# Patient Record
Sex: FEMALE | Race: WHITE | Employment: OTHER | URBAN - METROPOLITAN AREA
[De-identification: names, ages, dates, MRNs, and addresses within clinical notes are randomized per-mention and may not be internally consistent; named-entity substitution may affect disease eponyms.]

---

## 2017-04-12 ENCOUNTER — EMPLOYEE WELLNESS (OUTPATIENT)
Dept: OTHER | Age: 62
End: 2017-04-12

## 2017-04-12 ENCOUNTER — HOSPITAL ENCOUNTER (OUTPATIENT)
Age: 62
Discharge: HOME | End: 2017-04-12
Payer: COMMERCIAL

## 2017-04-12 LAB
ALT SERPL-CCNC: 12 U/L (ref 5–33)
ANION GAP SERPL CALCULATED.3IONS-SCNC: 14 MMOL/L (ref 9–17)
BUN BLDV-MCNC: 17 MG/DL (ref 8–23)
BUN/CREAT BLD: 25 (ref 9–20)
CALCIUM SERPL-MCNC: 9.5 MG/DL (ref 8.6–10.4)
CHLORIDE BLD-SCNC: 101 MMOL/L (ref 98–107)
CHOLESTEROL/HDL RATIO: 5.4
CHOLESTEROL/HDL RATIO: 5.6
CHOLESTEROL: 173 MG/DL
CHOLESTEROL: 174 MG/DL
CO2: 28 MMOL/L (ref 20–31)
CREAT SERPL-MCNC: 0.68 MG/DL (ref 0.5–0.9)
CREATININE URINE: 206.1 MG/DL (ref 28–217)
ESTIMATED AVERAGE GLUCOSE: 143 MG/DL
ESTIMATED AVERAGE GLUCOSE: 143 MG/DL
GFR AFRICAN AMERICAN: >60 ML/MIN
GFR NON-AFRICAN AMERICAN: >60 ML/MIN
GFR SERPL CREATININE-BSD FRML MDRD: ABNORMAL ML/MIN/{1.73_M2}
GFR SERPL CREATININE-BSD FRML MDRD: ABNORMAL ML/MIN/{1.73_M2}
GLUCOSE BLD-MCNC: 157 MG/DL (ref 70–99)
GLUCOSE BLD-MCNC: 161 MG/DL (ref 70–99)
HBA1C MFR BLD: 6.6 % (ref 4–6)
HBA1C MFR BLD: 6.6 % (ref 4–6)
HDLC SERPL-MCNC: 31 MG/DL
HDLC SERPL-MCNC: 32 MG/DL
LDL CHOLESTEROL: 111 MG/DL (ref 0–130)
LDL CHOLESTEROL: 112 MG/DL (ref 0–130)
MICROALBUMIN/CREAT 24H UR: 104 MG/L
MICROALBUMIN/CREAT UR-RTO: 50 MCG/MG CREAT
PATIENT FASTING?: YES
POTASSIUM SERPL-SCNC: 4 MMOL/L (ref 3.7–5.3)
SODIUM BLD-SCNC: 143 MMOL/L (ref 135–144)
THYROXINE, FREE: 1.23 NG/DL (ref 0.93–1.7)
TOTAL CK: 55 U/L (ref 26–192)
TRIGL SERPL-MCNC: 149 MG/DL
TRIGL SERPL-MCNC: 154 MG/DL
TSH SERPL DL<=0.05 MIU/L-ACNC: 1.79 MIU/L (ref 0.3–5)
VLDLC SERPL CALC-MCNC: ABNORMAL MG/DL (ref 1–30)
VLDLC SERPL CALC-MCNC: ABNORMAL MG/DL (ref 1–30)

## 2017-04-12 PROCEDURE — 84439 ASSAY OF FREE THYROXINE: CPT

## 2017-04-12 PROCEDURE — 82550 ASSAY OF CK (CPK): CPT

## 2017-04-12 PROCEDURE — 83036 HEMOGLOBIN GLYCOSYLATED A1C: CPT

## 2017-04-12 PROCEDURE — 82570 ASSAY OF URINE CREATININE: CPT

## 2017-04-12 PROCEDURE — 36415 COLL VENOUS BLD VENIPUNCTURE: CPT

## 2017-04-12 PROCEDURE — 80048 BASIC METABOLIC PNL TOTAL CA: CPT

## 2017-04-12 PROCEDURE — 84443 ASSAY THYROID STIM HORMONE: CPT

## 2017-04-12 PROCEDURE — 84460 ALANINE AMINO (ALT) (SGPT): CPT

## 2017-04-12 PROCEDURE — 80061 LIPID PANEL: CPT

## 2017-04-12 PROCEDURE — 82043 UR ALBUMIN QUANTITATIVE: CPT

## 2017-05-26 ENCOUNTER — HOSPITAL ENCOUNTER (OUTPATIENT)
Dept: MAMMOGRAPHY | Age: 62
Discharge: HOME | End: 2017-05-26
Payer: COMMERCIAL

## 2017-05-26 ENCOUNTER — HOSPITAL ENCOUNTER (EMERGENCY)
Facility: CLINIC | Age: 62
Discharge: HOME | End: 2017-05-26
Attending: EMERGENCY MEDICINE
Payer: COMMERCIAL

## 2017-05-26 VITALS
BODY MASS INDEX: 32.2 KG/M2 | DIASTOLIC BLOOD PRESSURE: 78 MMHG | WEIGHT: 175 LBS | TEMPERATURE: 97.9 F | HEART RATE: 82 BPM | SYSTOLIC BLOOD PRESSURE: 134 MMHG | RESPIRATION RATE: 16 BRPM | OXYGEN SATURATION: 97 % | HEIGHT: 62 IN

## 2017-05-26 DIAGNOSIS — Z12.31 VISIT FOR SCREENING MAMMOGRAM: ICD-10-CM

## 2017-05-26 DIAGNOSIS — S05.8X2A EYE ABRASION, LEFT, INITIAL ENCOUNTER: Primary | ICD-10-CM

## 2017-05-26 PROCEDURE — 77063 BREAST TOMOSYNTHESIS BI: CPT

## 2017-05-26 PROCEDURE — 99283 EMERGENCY DEPT VISIT LOW MDM: CPT

## 2017-05-26 RX ORDER — TOBRAMYCIN 3 MG/ML
1 SOLUTION/ DROPS OPHTHALMIC EVERY 4 HOURS
Qty: 5 ML | Refills: 0 | Status: SHIPPED | OUTPATIENT
Start: 2017-05-26 | End: 2017-06-02

## 2017-05-26 RX ORDER — VALSARTAN 160 MG/1
160 TABLET ORAL DAILY
Status: ON HOLD | COMMUNITY
End: 2019-01-15 | Stop reason: ALTCHOICE

## 2017-05-26 ASSESSMENT — PAIN DESCRIPTION - PAIN TYPE
TYPE: ACUTE PAIN
TYPE: ACUTE PAIN

## 2017-05-26 ASSESSMENT — PAIN DESCRIPTION - FREQUENCY
FREQUENCY: CONTINUOUS
FREQUENCY: CONTINUOUS

## 2017-05-26 ASSESSMENT — PAIN - FUNCTIONAL ASSESSMENT: PAIN_FUNCTIONAL_ASSESSMENT: 0-10

## 2017-05-26 ASSESSMENT — PAIN DESCRIPTION - LOCATION
LOCATION: EYE
LOCATION: EYE

## 2017-05-26 ASSESSMENT — PAIN SCALES - GENERAL
PAINLEVEL_OUTOF10: 2
PAINLEVEL_OUTOF10: 2

## 2017-05-26 ASSESSMENT — PAIN DESCRIPTION - ORIENTATION
ORIENTATION: LEFT
ORIENTATION: LEFT

## 2017-08-01 ENCOUNTER — HOSPITAL ENCOUNTER (OUTPATIENT)
Age: 62
Discharge: HOME | End: 2017-08-01
Payer: COMMERCIAL

## 2017-08-01 LAB — RUBV IGG SER QL: 39.7 IU/ML

## 2017-08-01 PROCEDURE — 86481 TB AG RESPONSE T-CELL SUSP: CPT

## 2017-08-01 PROCEDURE — 86762 RUBELLA ANTIBODY: CPT

## 2017-08-01 PROCEDURE — 86765 RUBEOLA ANTIBODY: CPT

## 2017-08-01 PROCEDURE — 86735 MUMPS ANTIBODY: CPT

## 2017-08-01 PROCEDURE — 86787 VARICELLA-ZOSTER ANTIBODY: CPT

## 2017-08-03 LAB — MEASLES IMMUNE (IGG): 2.31

## 2017-08-04 LAB
MUV IGG SER QL: 1.14
T-SPOT TB TEST: NORMAL
VZV IGG SER QL IA: 1.56

## 2017-09-22 ENCOUNTER — HOSPITAL ENCOUNTER (OUTPATIENT)
Dept: GENERAL RADIOLOGY | Age: 62
Discharge: HOME | End: 2017-09-22
Payer: COMMERCIAL

## 2017-09-22 ENCOUNTER — HOSPITAL ENCOUNTER (OUTPATIENT)
Age: 62
Discharge: HOME | End: 2017-09-22
Payer: COMMERCIAL

## 2017-09-22 DIAGNOSIS — M54.31 BACK PAIN WITH RIGHT-SIDED SCIATICA: ICD-10-CM

## 2017-09-22 DIAGNOSIS — M70.71 BURSITIS OF BOTH HIPS, UNSPECIFIED BURSA: ICD-10-CM

## 2017-09-22 DIAGNOSIS — M70.72 BURSITIS OF BOTH HIPS, UNSPECIFIED BURSA: ICD-10-CM

## 2017-09-22 LAB
-: ABNORMAL
ABSOLUTE EOS #: 0.2 K/UL (ref 0–0.4)
ABSOLUTE LYMPH #: 2 K/UL (ref 1–4.8)
ABSOLUTE MONO #: 0.4 K/UL (ref 0.2–0.8)
AMORPHOUS: ABNORMAL
BACTERIA: ABNORMAL
BASOPHILS # BLD: 0 %
BASOPHILS ABSOLUTE: 0 K/UL (ref 0–0.2)
BILIRUBIN URINE: NEGATIVE
CASTS UA: ABNORMAL /LPF
COLOR: YELLOW
COMMENT UA: ABNORMAL
CRYSTALS, UA: ABNORMAL /HPF
DIFFERENTIAL TYPE: NORMAL
EOSINOPHILS RELATIVE PERCENT: 3 %
EPITHELIAL CELLS UA: ABNORMAL /HPF
GLUCOSE URINE: NEGATIVE
HCT VFR BLD CALC: 41.7 % (ref 36–46)
HEMOGLOBIN: 14.2 G/DL (ref 12–16)
KETONES, URINE: NEGATIVE
LEUKOCYTE ESTERASE, URINE: NEGATIVE
LYMPHOCYTES # BLD: 26 %
MCH RBC QN AUTO: 31.8 PG (ref 26–34)
MCHC RBC AUTO-ENTMCNC: 33.9 G/DL (ref 31–37)
MCV RBC AUTO: 93.7 FL (ref 80–100)
MONOCYTES # BLD: 5 %
MUCUS: ABNORMAL
NITRITE, URINE: NEGATIVE
OTHER OBSERVATIONS UA: ABNORMAL
PDW BLD-RTO: 12.9 % (ref 11.5–14.5)
PH UA: 5 (ref 5–8)
PLATELET # BLD: 244 K/UL (ref 130–400)
PLATELET ESTIMATE: NORMAL
PMV BLD AUTO: 8 FL (ref 6–12)
PROTEIN UA: NEGATIVE
RBC # BLD: 4.45 M/UL (ref 4–5.2)
RBC # BLD: NORMAL 10*6/UL
RBC UA: ABNORMAL /HPF (ref 0–2)
RENAL EPITHELIAL, UA: ABNORMAL /HPF
SEG NEUTROPHILS: 66 %
SEGMENTED NEUTROPHILS ABSOLUTE COUNT: 4.9 K/UL (ref 1.8–7.7)
SPECIFIC GRAVITY UA: 1.03 (ref 1–1.03)
TRICHOMONAS: ABNORMAL
TURBIDITY: ABNORMAL
URINE HGB: NEGATIVE
UROBILINOGEN, URINE: NORMAL
WBC # BLD: 7.5 K/UL (ref 3.5–11)
WBC # BLD: NORMAL 10*3/UL
WBC UA: ABNORMAL /HPF (ref 0–5)
YEAST: ABNORMAL

## 2017-09-22 PROCEDURE — 72100 X-RAY EXAM L-S SPINE 2/3 VWS: CPT

## 2017-09-22 PROCEDURE — 73502 X-RAY EXAM HIP UNI 2-3 VIEWS: CPT

## 2017-09-22 PROCEDURE — 85025 COMPLETE CBC W/AUTO DIFF WBC: CPT

## 2017-09-22 PROCEDURE — 81001 URINALYSIS AUTO W/SCOPE: CPT

## 2017-09-25 ENCOUNTER — HOSPITAL ENCOUNTER (OUTPATIENT)
Age: 62
Discharge: HOME | End: 2017-09-25
Payer: COMMERCIAL

## 2017-09-25 LAB
-: ABNORMAL
AMORPHOUS: ABNORMAL
BACTERIA: ABNORMAL
BILIRUBIN URINE: NEGATIVE
CASTS UA: ABNORMAL /LPF
COLOR: YELLOW
COMMENT UA: ABNORMAL
CRYSTALS, UA: ABNORMAL /HPF
EPITHELIAL CELLS UA: ABNORMAL /HPF
GLUCOSE URINE: NEGATIVE
KETONES, URINE: NEGATIVE
LEUKOCYTE ESTERASE, URINE: NEGATIVE
MUCUS: ABNORMAL
NITRITE, URINE: NEGATIVE
OTHER OBSERVATIONS UA: ABNORMAL
PH UA: 6 (ref 5–8)
PROTEIN UA: NEGATIVE
RBC UA: ABNORMAL /HPF (ref 0–2)
RENAL EPITHELIAL, UA: ABNORMAL /HPF
SPECIFIC GRAVITY UA: 1.02 (ref 1–1.03)
TRICHOMONAS: ABNORMAL
TURBIDITY: ABNORMAL
URINE HGB: ABNORMAL
UROBILINOGEN, URINE: NORMAL
WBC UA: ABNORMAL /HPF (ref 0–5)
YEAST: ABNORMAL

## 2017-09-25 PROCEDURE — 81001 URINALYSIS AUTO W/SCOPE: CPT

## 2017-09-26 ENCOUNTER — HOSPITAL ENCOUNTER (OUTPATIENT)
Dept: PHYSICAL THERAPY | Facility: CLINIC | Age: 62
Setting detail: THERAPIES SERIES
Discharge: HOME | End: 2017-09-26
Payer: COMMERCIAL

## 2017-09-26 PROCEDURE — 97162 PT EVAL MOD COMPLEX 30 MIN: CPT

## 2017-09-26 PROCEDURE — 97140 MANUAL THERAPY 1/> REGIONS: CPT

## 2017-09-28 ENCOUNTER — HOSPITAL ENCOUNTER (OUTPATIENT)
Dept: PHYSICAL THERAPY | Facility: CLINIC | Age: 62
Setting detail: THERAPIES SERIES
Discharge: HOME | End: 2017-09-28
Payer: COMMERCIAL

## 2017-09-28 PROCEDURE — 97110 THERAPEUTIC EXERCISES: CPT

## 2017-10-02 ENCOUNTER — APPOINTMENT (OUTPATIENT)
Dept: PHYSICAL THERAPY | Facility: CLINIC | Age: 62
End: 2017-10-02
Payer: COMMERCIAL

## 2017-10-02 ENCOUNTER — HOSPITAL ENCOUNTER (OUTPATIENT)
Dept: PHYSICAL THERAPY | Facility: CLINIC | Age: 62
Setting detail: THERAPIES SERIES
Discharge: HOME | End: 2017-10-02
Payer: COMMERCIAL

## 2017-10-02 PROCEDURE — 97140 MANUAL THERAPY 1/> REGIONS: CPT

## 2017-10-02 PROCEDURE — G0283 ELEC STIM OTHER THAN WOUND: HCPCS

## 2017-10-02 PROCEDURE — 97110 THERAPEUTIC EXERCISES: CPT

## 2017-10-05 ENCOUNTER — HOSPITAL ENCOUNTER (OUTPATIENT)
Dept: PHYSICAL THERAPY | Facility: CLINIC | Age: 62
Setting detail: THERAPIES SERIES
Discharge: HOME | End: 2017-10-05
Payer: COMMERCIAL

## 2017-10-05 PROCEDURE — 97110 THERAPEUTIC EXERCISES: CPT

## 2017-10-05 PROCEDURE — 97140 MANUAL THERAPY 1/> REGIONS: CPT

## 2017-10-05 NOTE — FLOWSHEET NOTE
[] Montana Whipple       Outpatient Physical        Therapy       [address]       Phone: [phone]       Fax: [phone] [x] [facility] [address] [facility]       Phone: [phone]       Fax: [phone] [] Danedharmesh. [address] [place] [address] [facility]  [address] [place] [address]   Phone: [phone]   Fax:  [phone]     Physical Therapy Daily Treatment Note    Date:  10/5/2017  Patient Name:  Connor Lyme    :  1955  MRN: [id number]   Physician: Marty Moya MD                                                         Insurance: Medical [place]  Medical Diagnosis: Right sided sciatica, Bursitis of R hip             Rehab Codes: M54.31, M70.71   Onset Date: 17                                  Next 's appt.: prn   Visit# / total visits:   Cancels/No Shows: 0/0    Subjective:    Pain:  [x] Yes  [] No Location:  R side buttock area  Pain Rating: (0-10 scale) 4/10  Pain altered Tx:  [] No  [x] Yes  Action: HEP- extension exercises    Comments: Pt stated she has experienced decreased radiating pain down the RLE, but continues to have sharp pain localized to the R side buttock area. Objective:     Modalities:IFC to low back, R piriformis and posterior thigh with CP along low back/posterior hip and CP along anterior hip x 20 min  Precautions: limit Flexion based exercises secondary to increased pain/sx; significant improvement with repeated lumbar extension   Exercises:   Exercise Reps/ Time Weight/ Level Comments Performed    Pt.  Education 3 min   Added lumbar roll for sitting activities  X   Standing back extension stretch 5x15\"  Education for HEP X   Sitting back extension stretch 2x15\"  Education for HEP X   Posterior Pelvic Tilt  10x  3 s hold  Increase in pain (R hip)-HOLD    PPT with marching          PPT with walk outs         HS stretch b/l  4x20\"    stool  x   Piriformis stretch b/l  4x20\"   Sitting  x Gastroc stretch b/l           IT band stretch b/l  3x20\"     X   Clams  15x Red TB   X   SLR                               PRONE on one pillow       Prone lying    5 mins     X   BENNY 2 min x 2   ADDED REP 10/2 X   Press ups  10x2   X   Gluteal sets 10x5\"   X    HS curls 10x ea  yellow TB  added resistance 10/5 X    Hip ext  10x ea  yellow TB SLR; added resistance 10/5 X                                   Other:       Manual - Foam roll to B piriformis/IT band (focus on R) x 10 min        Specific Instructions for next treatment: check leg length with heel lift next tx, MFR/foam roll piriformis/IT band, core stabilization, hip strengthening/flexibility, proper posture mechanics       Treatment Charges: Mins Units   [x]  Modalities: IFC/CP x 2 20/20 1/0   [x]  Ther Exercise 35 2   [x]  Manual Therapy 10 1   []  Ther Activities     []  Aquatics     []  Vasocompression     []  Other     Total Treatment time 65 5       Assessment: [x] Progressing toward goals. Pt tolerated above exercises well today with no increase in pain. Pt stated lumbar extension exercises significantly reduce her pain especially during her 12 hour shifts. Pt stated yesterday she had localized pain in her R buttocks but it did not radiate into her thigh (during her work shift), she performed standing lumbar extension and felt relief of her sx/sx. Pt was instructed to add a lumbar roll with sitting activities especially with driving and for upcoming travel this weekend to trial if it reduces pain. Pt will continue to benefit from therapy to reduce pain and improve posture mechanics. [] No change. [] Other:       STG: (to be met in 8 treatments) (Start date: 9/26/17)   1. ? Pain: <6/10 at worst with aggravating activities  2. ? ROM: lumbar extension >20 deg, Side bending>  25 deg, flexion >70 deg all pain free  a. (-) HS tightness b/l  b. (-) elys test b/l  3. ? Strength:>4/5 for BLE strength (era.  Hip)   4. ? Function: able to sit for >10

## 2017-10-09 ENCOUNTER — APPOINTMENT (OUTPATIENT)
Dept: PHYSICAL THERAPY | Facility: CLINIC | Age: 62
End: 2017-10-09
Payer: COMMERCIAL

## 2017-10-09 ENCOUNTER — HOSPITAL ENCOUNTER (OUTPATIENT)
Dept: PHYSICAL THERAPY | Facility: CLINIC | Age: 62
Setting detail: THERAPIES SERIES
Discharge: HOME | End: 2017-10-09
Payer: COMMERCIAL

## 2017-10-09 PROCEDURE — G0283 ELEC STIM OTHER THAN WOUND: HCPCS

## 2017-10-09 PROCEDURE — 97110 THERAPEUTIC EXERCISES: CPT

## 2017-10-09 NOTE — FLOWSHEET NOTE
[] Cecelia Mckeon       Outpatient Physical        Therapy       [address]       Phone: [phone]       Fax: [phone] [x] [facility] at [address]       Phone: [phone]       Fax: [phone] [] Yaneth. [address] [place] [address] [facility]  [address] [place] [address]   Phone: [phone]   Fax:  [phone]     Physical Therapy Daily Treatment Note    Date:  10/9/2017  Patient Name:  Sam Chow    :  1955  MRN: [id number]   Physician: Ace Carrillo MD                                                         Insurance: Medical [place]  Medical Diagnosis: Right sided sciatica, Bursitis of R hip             Rehab Codes: M54.31, M70.71   Onset Date: 17                                  Next 's appt.: prn   Visit# / total visits:   Cancels/No Shows: 0/0    Subjective:    Pain:  [x] Yes  [] No Location:  R side buttock area  Pain Rating: (0-10 scale) 3/10 (increasingly intermittent)  Pain altered Tx:  [] No  [x] Yes  Action: HEP- extension exercises    Comments: Pt reports she feels like she is improving and had minimal increase in pain with driving to/from [place]. She stated once the pain came on, she would perform the lumbar extension HEP and her pain would reduce significantly. Pt reports sx/sx are coming about less often. Objective:    Modalities:IFC to low back, R piriformis and posterior thigh with CP along low back/posterior hip and CP along anterior hip x 20 min  Precautions: limit Flexion based exercises secondary to increased pain/sx; significant improvement with repeated lumbar extension   Exercises:   Exercise Reps/ Time Weight/ Level Comments Performed    Pt.  Education 3 min   Added lumbar roll for sitting activities     Standing back extension stretch 5x15\"  Education for HEP X   Sitting back extension stretch 2x15\"  Education for HEP X   Posterior Pelvic Tilt  10x  3 s hold

## 2017-10-12 ENCOUNTER — HOSPITAL ENCOUNTER (OUTPATIENT)
Facility: CLINIC | Age: 62
Discharge: HOME | End: 2017-10-12
Payer: COMMERCIAL

## 2017-10-12 ENCOUNTER — HOSPITAL ENCOUNTER (OUTPATIENT)
Dept: PHYSICAL THERAPY | Facility: CLINIC | Age: 62
Setting detail: THERAPIES SERIES
Discharge: HOME | End: 2017-10-12
Payer: COMMERCIAL

## 2017-10-12 ENCOUNTER — HOSPITAL ENCOUNTER (OUTPATIENT)
Dept: ULTRASOUND IMAGING | Facility: CLINIC | Age: 62
Discharge: HOME | End: 2017-10-12
Payer: COMMERCIAL

## 2017-10-12 DIAGNOSIS — R31.9 HEMATURIA: ICD-10-CM

## 2017-10-12 LAB
-: NORMAL
AMORPHOUS: NORMAL
BACTERIA: NORMAL
BILIRUBIN URINE: NEGATIVE
CASTS UA: NORMAL /LPF (ref 0–8)
COLOR: YELLOW
COMMENT UA: ABNORMAL
CRYSTALS, UA: NORMAL /HPF
EPITHELIAL CELLS UA: NORMAL /HPF (ref 0–5)
GLUCOSE URINE: NEGATIVE
KETONES, URINE: NEGATIVE
LEUKOCYTE ESTERASE, URINE: NEGATIVE
MUCUS: NORMAL
NITRITE, URINE: NEGATIVE
OTHER OBSERVATIONS UA: NORMAL
PH UA: 6.5 (ref 5–8)
PROTEIN UA: NEGATIVE
RBC UA: NORMAL /HPF (ref 0–4)
RENAL EPITHELIAL, UA: NORMAL /HPF
SPECIFIC GRAVITY UA: 1.01 (ref 1–1.03)
TRICHOMONAS: NORMAL
TURBIDITY: CLEAR
URINE HGB: ABNORMAL
UROBILINOGEN, URINE: NORMAL
WBC UA: NORMAL /HPF (ref 0–5)
YEAST: NORMAL

## 2017-10-12 PROCEDURE — 97016 VASOPNEUMATIC DEVICE THERAPY: CPT

## 2017-10-12 PROCEDURE — 76775 US EXAM ABDO BACK WALL LIM: CPT

## 2017-10-12 PROCEDURE — 97110 THERAPEUTIC EXERCISES: CPT

## 2017-10-12 PROCEDURE — 81001 URINALYSIS AUTO W/SCOPE: CPT

## 2017-10-12 PROCEDURE — 97140 MANUAL THERAPY 1/> REGIONS: CPT

## 2017-10-12 NOTE — FLOWSHEET NOTE
(to be met in 8 treatments) (Start date: 9/26/17)   1. ? Pain: <6/10 at worst with aggravating activities  2. ? ROM: lumbar extension >20 deg, Side bending>  25 deg, flexion >70 deg all pain free  a. (-) HS tightness b/l  b. (-) elys test b/l  3. ? Strength:>4/5 for BLE strength (era. Hip)   4. ? Function: able to sit for >10 min with no increase in sx/sx  a. Able to walk >30 min with no increase in sx/sx  5. Independent with Home Exercise Programs  6. Corrected leg length discrepancy to improve walking mechanics and decrease stress on low back/R hip   LTG: (to be met in 12 treatments)  1. Pt to score less than 20% perceived disability with the LEFS. 2. Pt able to increase abdominal core strength to 3/5 for core stabilization to reduce low back pain. 3. Pt reports <2/10 with aggravating activities.         Patient goals: to be pain free     Pt. Education:  [x] Yes  [] No  [x] Reviewed Prior HEP/Ed- added lumbar roll for sitting activities. Method of Education: [x] Verbal  [x] Demo  [x] Written  Comprehension of Education:  [x] Verbalizes understanding. [] Demonstrates understanding. [x] Needs review. [] Demonstrates/verbalizes HEP/Ed previously given. Plan: [x] Continue per plan of care.    [] Other:      Time In: 3:15  Time Out: 4:17 p  Electronically signed by:  Sydni Andres PT

## 2017-10-16 ENCOUNTER — HOSPITAL ENCOUNTER (OUTPATIENT)
Dept: PHYSICAL THERAPY | Facility: CLINIC | Age: 62
Setting detail: THERAPIES SERIES
Discharge: HOME | End: 2017-10-16
Payer: COMMERCIAL

## 2017-10-16 PROCEDURE — 97110 THERAPEUTIC EXERCISES: CPT

## 2017-10-16 PROCEDURE — 97140 MANUAL THERAPY 1/> REGIONS: CPT

## 2017-10-16 PROCEDURE — G0283 ELEC STIM OTHER THAN WOUND: HCPCS

## 2017-10-19 ENCOUNTER — HOSPITAL ENCOUNTER (OUTPATIENT)
Dept: PHYSICAL THERAPY | Facility: CLINIC | Age: 62
Setting detail: THERAPIES SERIES
Discharge: HOME | End: 2017-10-19
Payer: COMMERCIAL

## 2017-10-19 PROCEDURE — 97110 THERAPEUTIC EXERCISES: CPT

## 2017-10-19 PROCEDURE — G0283 ELEC STIM OTHER THAN WOUND: HCPCS

## 2017-10-19 NOTE — FLOWSHEET NOTE
[] Elizabeth Pantoja       Outpatient Physical        Therapy       [address]       Phone: [phone]       Fax: [phone] [x] [facility] [address] [facility]       Phone: [phone]       Fax: [phone] [] Stevo Franco [facility]  [address] [facility]   Phone: [phone]   Fax:  [phone]     Physical Therapy Daily Treatment Note    Date:  10/19/2017  Patient Name:  Neeru Baltazar    :  1955  MRN: [id number]   Physician: Uma Reddy MD                                                         Insurance: Medical [place]  Medical Diagnosis: Right sided sciatica, Bursitis of R hip             Rehab Codes: M54.31, M70.71   Onset Date: 17                                  Next 's appt.: prn   Visit# / total visits:   Cancels/No Shows: 0/0    Subjective:    Pain:  [x] Yes  [] No Location:  R side buttock area  Pain Rating: (0-10 scale) 2/10 (now) over the weekend 5/10 (at worst)   Pain altered Tx:  [] No  [x] Yes  Action: HEP- extension exercises, ice    Comments: Pt reported increased aggravation of posterior buttocks/lateral hip pain this weekend, but reduced with performing HEP. Pt commented that she feels anxious when the pain comes on again, in worry that it will not go away. (therapist addressed pt's concerns)        Objective:    Modalities: Vaso to R hip x 15 min, low compression at 34 deg     IFC to low back, R piriformis and posterior thigh with CP along low back/posterior hip and CP along anterior hip x 20 min     Precautions: limit Flexion based exercises secondary to increased pain/sx; significant improvement with repeated lumbar extension     Exercises:   Exercise Reps/ Time Weight/ Level Comments Performed    Pt.  Education 3 min   Use of lacrosse ball for trigger point release  X   Standing back extension stretch 5x15\"  Education for HEP X   Sitting back extension stretch

## 2017-10-19 NOTE — PROGRESS NOTES
- 20 deg to R, 25 deg to L, no pain      Assessed pelvic alignment in standing- equal with heel lift 10/12    Assessment:  [x] Progressing toward goals. Pt is progressing with therapy and reports significant decrease in sx/sx since therapy began. Pt has met majority of her short term goals with decreased pain, increased BLE strength, improved function, and improved pelvic obliquity. Pt continues to exhibit limited lumbar AROM with slight improvement in range and decreased pain reported with all motions. Pt demonstrates moderate hip weakness with mild improvement. Therapist will continue with plan of care and work towards improving b/l hip strength, flexibility, and returning pt to PLOF with minimal to no pain. [] No change. [] Other:         STG: (to be met in 8 treatments) (Start date: 9/26/17)   1. ? Pain: <6/10 at worst with aggravating activities MET (1x at work, averaging 2/10)   2. ? ROM: lumbar extension >20 deg, Side bending>  25 deg, flexion >70 deg all pain free (not met, 70 degrees, 20 deg to R, 25 deg to L , and 15 deg for extension NOT MET    a. (-) HS tightness b/l (MET on L, NOT MET ON R (9 deg deficit))   b. (-) elys test b/l (+ on R)   3. ? Strength:>4/5 for BLE strength (era. Hip) - PARTIALLY met  4. ? Function:   a. able to sit for >10 min with no increase in sx/sx MET   b. Able to walk >30 min with no increase in sx/sx MET  5. Independent with Home Exercise Programs met   6. Corrected leg length discrepancy to improve walking mechanics and decrease stress on low back/R hip  MET     LTG: (to be met in 12 treatments)  1. Pt to score less than 20% perceived disability with the LEFS. 2. Pt able to increase abdominal core strength to 3/5 for core stabilization to reduce low back pain.    3. Pt reports <2/10 with aggravating activities.         Patient goals: to be pain free      Treatment to Date:  [x] Therapeutic Exercise    [x] Modalities:  [x] Therapeutic Activity    [] Ultrasound  [x] Electrical Stimulation  [x] Gait Training     [x] Massage       [] Lumbar/Cervical Traction  [x] Neuromuscular Re-education [x] Cold/hotpack [] Iontophoresis: 4 mg/mL  [x] Instruction in Home Exercise Program                     Dexamethasone Sodium  [x] Manual Therapy             Phosphate 40-80 mAmin  [] Aquatic Therapy                   [x] Vasocompression/    [] Other:            Game Ready    Patient Status:     [x] Continue per initial plan of care. [] Additional visits necessary. [] Other:             Electronically signed by: Soy Murillo PT    If you have any questions or concerns, please don't hesitate to call. Thank you for your referral.    Physician Signature:________________________________Date:__________________  By signing above or cosigning this note, I have reviewed this plan of care and certify a need for medically necessary rehabilitation services.      *PLEASE SIGN ABOVE AND FAX BACK ALL PAGES*

## 2017-10-24 ENCOUNTER — HOSPITAL ENCOUNTER (OUTPATIENT)
Dept: PHYSICAL THERAPY | Facility: CLINIC | Age: 62
Setting detail: THERAPIES SERIES
Discharge: HOME | End: 2017-10-24
Payer: COMMERCIAL

## 2017-10-24 PROCEDURE — 97110 THERAPEUTIC EXERCISES: CPT

## 2017-10-24 PROCEDURE — 97140 MANUAL THERAPY 1/> REGIONS: CPT

## 2017-10-24 NOTE — FLOWSHEET NOTE
[] [facility]       Outpatient Physical        Therapy       [address]       Phone: [phone]       Fax: [phone] [x] [facility] at [address]       Phone: [phone]       Fax: [phone] [] Yaneth. [facility]  [address] [place] [address]   Phone: [phone]   Fax:  [phone]     Physical Therapy Daily Treatment Note    Date:  10/24/2017  Patient Name:  Rona Sanders    :  1955  MRN: [id number]   Physician: Faith Mendez MD                                                         Insurance: Medical [place]  Medical Diagnosis: Right sided sciatica, Bursitis of R hip             Rehab Codes: M54.31, M70.71   Onset Date: 17                                  Next 's appt.: prn   Visit# / total visits:   Cancels/No Shows: 0/0    Subjective:    Pain:  [x] Yes  [] No Location:  R side buttock area  Pain Rating: (0-10 scale) 4/10 (now) over the weekend 5/10 (at worst)   Pain altered Tx:  [] No  [x] Yes  Action: HEP- extension exercises, ice    Comments: Pt went out of town to [place]. Pt was performing long distance driving/flying on airplane with long durations of lumbar flexion causing increased sx/sx. Pt reported her pain would increase, but the lumbar roll \"seemed to help lessen the pain. \" Pt stated she was very compliant with her HEP. Objective:    Modalities: Vaso to R hip x 15 min, low compression at 34 deg     IFC to low back, R piriformis and posterior thigh with CP along low back/posterior hip and CP along anterior hip x 20 min HELD 10/24 d/t patient having red, spotty rash along low back with a few patches in mid-thoracic; Pt reports rash may be related to estim as she has used TENs in the past (20+ years ago) and had a similar reaction. Pt was adamant to use estim regardless today as she feels it greatly relieves sx/sx.  However, therapist advised agaisnt heat region    L3-4 Knee Ext 4; 5  4-5 5  5    L4 Ankle DF 4-; 5  3+ 5 3+; 5    L5 EHL NT NT NT NT   S1 Plant. Flex 4 4 NT NT   Abdominals 2       Erector Spinae         Hip extension  3+ 3+  4 4-     Lumbar AROM 10-20:   - 70 deg, non-painful, pulling sensation   - 15 deg extension, no pain    - 20 deg to R, 25 deg to L, no pain      Specific Instructions for next treatment:  MFR/foam roll piriformis/IT band, core stabilization, hip strengthening/flexibility (add standing ex next tx session)  proper posture mechanics       Treatment Charges: Mins Units   [x]  Modalities: CP 15 0   [x]  Ther Exercise 35 2   [x]  Manual Therapy 10 1    []  Ther Activities     []  Aquatics     []  Vasocompression     []  Other- estim     Total Treatment time 45 3       Assessment: [x] Progressing toward goals. Pt reported increased pain over the weekend with repetitive, prolonged sitting with car/airplane rides. Pt tolerated added exercises well with no increase in sx/sx. Pt reported at end of session pain was at 0/10. Pt mentioned she developed a rash last week after therapy and believes it may be related to e-stim. The rash was along low back/mid thoracic region and is \"itchy\" outside the area of the patches. Therapist advised pt to seek medical care for rash, pt was hesitant. Therapist held electrical stim as pt is not appropriate with active, undiagnosed rash. Pt will continue to benefit from therapeutics intervention to improve walking mechanics, hip strength, and reduce pain. [] No change. [] Other:       STG: (to be met in 8 treatments) (Start date: 9/26/17)   1. ? Pain: <6/10 at worst with aggravating activities MET (1x at work, averaging 2/10)   2. ? ROM: lumbar extension >20 deg, Side bending>  25 deg, flexion >70 deg all pain free (not met, 70 degrees, 20 deg to R, 25 deg to L , and 15 deg for extension NOT MET    a. (-) HS tightness b/l (MET on L, NOT MET ON R (9 deg deficit))   b. (-) elys test b/l (+ on R)   3. ?

## 2017-10-26 ENCOUNTER — HOSPITAL ENCOUNTER (OUTPATIENT)
Dept: PHYSICAL THERAPY | Facility: CLINIC | Age: 62
Setting detail: THERAPIES SERIES
Discharge: HOME | End: 2017-10-26
Payer: COMMERCIAL

## 2017-10-26 PROCEDURE — 97110 THERAPEUTIC EXERCISES: CPT

## 2017-10-26 PROCEDURE — G0283 ELEC STIM OTHER THAN WOUND: HCPCS

## 2017-10-26 PROCEDURE — 97140 MANUAL THERAPY 1/> REGIONS: CPT

## 2017-10-26 NOTE — FLOWSHEET NOTE
L4 Ankle DF 4-; 5  3+ 5 3+; 5    L5 EHL NT NT NT NT   S1 Plant. Flex 4 4 NT NT   Abdominals 2       Erector Spinae         Hip extension  3+ 3+  4 4-     Lumbar AROM 10-20:   - 70 deg, non-painful, pulling sensation   - 15 deg extension, no pain    - 20 deg to R, 25 deg to L, no pain      Specific Instructions for next treatment:  MFR/foam roll piriformis/IT band, core stabilization, hip strengthening/flexibility (add standing ex next tx session)  proper posture mechanics       Treatment Charges: Mins Units   [x]  Modalities: CP/estim 20 0/1   [x]  Ther Exercise 35 2   [x]  Manual Therapy 10 1    []  Ther Activities     []  Aquatics     []  Vasocompression     []  Other- estim     Total Treatment time 65 4       Assessment: [x] Progressing toward goals. Therapist advanced pt's exercise regimen to work towards decreasing localized buttocks pain since pt's low back/radiating pain has resolved. Pt tolerated added resistance/exercises well with minimal cueing (verbal/tactile) for proper form with good carry over noted. Pt is improving and therapist will continue to progress pt as tolerated. [] No change. [] Other:       STG: (to be met in 8 treatments) (Start date: 9/26/17)   1. ? Pain: <6/10 at worst with aggravating activities MET (1x at work, averaging 2/10)   2. ? ROM: lumbar extension >20 deg, Side bending>  25 deg, flexion >70 deg all pain free (not met, 70 degrees, 20 deg to R, 25 deg to L , and 15 deg for extension NOT MET    a. (-) HS tightness b/l (MET on L, NOT MET ON R (9 deg deficit))   b. (-) elys test b/l (+ on R)   3. ? Strength:>4/5 for BLE strength (era. Hip) - PARTIALLY met  4. ? Function:   a. able to sit for >10 min with no increase in sx/sx MET   b. Able to walk >30 min with no increase in sx/sx MET  5. Independent with Home Exercise Programs met   6.  Corrected leg length discrepancy to improve walking mechanics and decrease stress on low back/R hip  MET    LTG: (to be met in 12

## 2017-10-30 ENCOUNTER — HOSPITAL ENCOUNTER (OUTPATIENT)
Dept: PHYSICAL THERAPY | Facility: CLINIC | Age: 62
Setting detail: THERAPIES SERIES
Discharge: HOME | End: 2017-10-30
Payer: COMMERCIAL

## 2017-10-30 PROCEDURE — 97110 THERAPEUTIC EXERCISES: CPT

## 2017-10-30 PROCEDURE — 97140 MANUAL THERAPY 1/> REGIONS: CPT

## 2017-10-30 PROCEDURE — G0283 ELEC STIM OTHER THAN WOUND: HCPCS

## 2017-10-30 NOTE — PROGRESS NOTES
12 treatments) assessed 10/30   1. Pt to score less than 20% perceived disability with the LEFS. (30% perceived disability on Oswestry-will updated next session with LEFS)   2. Pt able to increase abdominal core strength to 3/5 for core stabilization to reduce low back pain. (PROGRESSING 2/5)     3. Pt reports <2/10 with aggravating activities. PROGRESSING (4/10)         Patient goals: to be pain free   Treatment to Date:  [x] Therapeutic Exercise    [x] Modalities:  [x] Therapeutic Activity    [] Ultrasound  [x] Electrical Stimulation  [] Gait Training     [] Massage       [] Lumbar/Cervical Traction  [x] Neuromuscular Re-education [x] Cold/hotpack [] Iontophoresis: 4 mg/mL  [x] Instruction in Home Exercise Program                     Dexamethasone Sodium  [x] Manual Therapy             Phosphate 40-80 mAmin  [] Aquatic Therapy                   [x] Vasocompression/    [] Other:            Game Ready    Patient Status:     [x] Continue per initial plan of care. [x] Additional visits necessary. [x] Other: order for at home TENS unit     Requested Frequency/Duration: 2 times per week for 6 treatments         Electronically signed by: Kyree Duran PT    If you have any questions or concerns, please don't hesitate to call. Thank you for your referral.    Physician Signature:________________________________Date:__________________  By signing above or cosigning this note, I have reviewed this plan of care and certify a need for medically necessary rehabilitation services.      *PLEASE SIGN ABOVE AND FAX BACK ALL PAGES*

## 2017-10-30 NOTE — FLOWSHEET NOTE
Performed    Pt.  Education 8 min   Use of tennis ball for trigger point release- demo'd with pt for 5 min and had her practice with therapist supervision; taught massage/trigger point release with good follow through 10/26  X   Standing back extension stretch 5x15\"  Education for HEP; dc to hep    Sitting back extension stretch 2x15\"  Education for HEP; dc to hep     Posterior Pelvic Tilt  20x 5 s hold  increased reps 10/26; performed 15 reps today 10/30 X   PPT with marching  20x   increased reps 10/26 X   PPT with walk outs 10x   increased reps 10/26 X   HS stretch b/l  4x20\"    stool     Piriformis stretch b/l  4x20\"    Sitting; d/c to HEP performed knee to opp shoulder 10/26 with good tolerance  X   Gastroc stretch b/l   20 s x 3   Added 10/30;wedge- taught technique for HEP with use of stairs  X   IT band stretch b/l  3x20\"        Clams  30x  Red TB Increased reps 10/9  X   SLR           LTR 10x , 5s hold    Added 10/26; 5 ea side  X   FAITH stretch 15 s x4    added 10/30 x          PRONE         Prone lying    5 mins   No pillow added  x   BENNY 45s x 2   ADDED REP 10/2 x   Press ups  10x2   x   Gluteal sets 20x5\"  Increased reps 10/9  x    HS curls 20x ea   lime green TB increased resistance 10/19; increased reps 10/26 X    Hip ext  15x ea Lime green TB SLR; increased resistance 10/19 X          Side-lying       Hip ABD with ER 10x  A Target the pirifromis; added 10/12 X   Reverse clams 15x  Red TB Added 10/26 x   Clams 15x  Red TB Added 10/26 X          Standing       3 way hip  10x ea  Red TB ; added 10/24;  X   Partial squats 15x   Cues for glut max activation/shifting weight onto heels; added 10/24 knee blocking required for proper form 10/26-good carry over after initial blocking  X   Fwd/Lateral step ups 15 ea  6 in Added 10/26  X    Hip hikes  10x ea  2 in step  Added 10/30  X   Other:      Manual - Foam roll to B piriformis/IT band (focus on R) x 10 min    TPR at insertion of piriformis 2 min x 2 4/10 with driving/prolonged sitting)    2. ? ROM: lumbar extension >20 deg, Side bending>  25 deg, flexion >70 deg all pain free  PROGRESSING 70 degrees, 20 deg to R, 25 deg to L , and 15 deg for extension NOT MET    a. (-) HS tightness b/l (MET on L, NOT MET ON R (9 deg deficit))   b. (-) elys test b/l (+ on R) Partially MET   3. ? Strength:>4/5 for BLE strength (era. Hip) - PARTIALLY met  4. ? Function:   a. able to sit for >10 min with no increase in sx/sx MET   b. Able to walk >30 min with no increase in sx/sx MET  5. Independent with Home Exercise Programs met   6. Corrected leg length discrepancy to improve walking mechanics and decrease stress on low back/R hip  MET    LTG: (to be met in 12 treatments) assessed 10/30   1. Pt to score less than 20% perceived disability with the LEFS. (30% perceived disability on Oswestry-will updated next session with LEFS)   2. Pt able to increase abdominal core strength to 3/5 for core stabilization to reduce low back pain. (PROGRESSING 2/5)     3. Pt reports <2/10 with aggravating activities. PROGRESSING (4/10)         Patient goals: to be pain free     Pt. Education:  [x] Yes  [] No  [x] Reviewed Prior HEP/Ed- tennis ball for trigger point release (seated)-encouraged to pt perform at least 2x per day   Method of Education: [x] Verbal  [x] Demo  [x] Written  Comprehension of Education:  [x] Verbalizes understanding. [] Demonstrates understanding. [x] Needs review. [] Demonstrates/verbalizes HEP/Ed previously given. Plan: [x] Continue per plan of care.    [x] Other: provide hip/squat HEP next session; administer LEFS       Time In: 6:59 a  Time Out: 8:15 a  Electronically signed by:  Will Galicia, PT

## 2017-11-02 ENCOUNTER — HOSPITAL ENCOUNTER (OUTPATIENT)
Dept: PHYSICAL THERAPY | Facility: CLINIC | Age: 62
Setting detail: THERAPIES SERIES
Discharge: HOME | End: 2017-11-02
Payer: COMMERCIAL

## 2017-11-02 PROCEDURE — 97110 THERAPEUTIC EXERCISES: CPT

## 2017-11-02 NOTE — FLOWSHEET NOTE
[] Didi Courtney       Outpatient Physical        Therapy       [address]       Phone: [phone]       Fax: [phone] [x] [facility] at [address]       Phone: [phone]       Fax: [phone] [] Yan[facility]. [address] [place] [address] [facility]  [address] [place] [address]   Phone: [phone]   Fax:  [phone]     Physical Therapy Daily Treatment Note    Date:  2017  Patient Name:  Misha Finnegan    :  1955  MRN: [id number]   Physician: Shari Ansari MD                                                         Insurance: Medical [place]  Medical Diagnosis: Right sided sciatica, Bursitis of R hip             Rehab Codes: M54.31, M70.71   Onset Date: 17                                  Next 's appt.: 2017  Visit# / total visits:  (6 additional visits added)  Cancels/No Shows: 0/0    Subjective:    Pain:  [x] Yes  [] No Location:  R side buttock area  Pain Rating: (0-10 scale)    Pain altered Tx:  [] No  [x] Yes  Action: HEP- extension exercises, ice    Comments pain free for 2.5 days even post work Tuesday/wednesday. Woke up with 2/10 pain. Objective:    Modalities: IFC to low back, R piriformis and posterior thigh with CP along low back/posterior hip and CP along anterior hip x 20 min HELD 10/26;    - estim applied to piriformis/lateral hip/glute med (used 2x2 electrodes) avoiding rash as it is aggravated with heat x 20 min with IFC     Precautions: limit Flexion based exercises secondary to increased pain/sx; significant improvement with repeated lumbar extension     Exercises:   Exercise Reps/ Time Weight/ Level Comments Performed    Pt.  Education 8 min   Use of tennis ball for trigger point release- demo'd with pt for 5 min and had her practice with therapist supervision; taught massage/trigger point release with good follow through 10/26  X   Standing back extension stretch 5x15\" met  4. ? Function:   a. able to sit for >10 min with no increase in sx/sx MET   b. Able to walk >30 min with no increase in sx/sx MET  5. Independent with Home Exercise Programs met   6. Corrected leg length discrepancy to improve walking mechanics and decrease stress on low back/R hip  MET    LTG: (to be met in 12 treatments) assessed 10/30 -   1. Pt to score less than 20% perceived disability with the LEFS. (30% perceived disability on Oswestry-will updated next session with LEFS)   2. Pt able to increase abdominal core strength to 3/5 for core stabilization to reduce low back pain. (PROGRESSING 2/5)     3. Pt reports <2/10 with aggravating activities. PROGRESSING (4/10)         Patient goals: to be pain free     Pt. Education:  [x] Yes  [] No  [x] Reviewed Prior HEP/Ed- provided Hip/standing HEP   Method of Education: [x] Verbal  [x] Demo  [x] Written  Comprehension of Education:  [x] Verbalizes understanding. [] Demonstrates understanding. [x] Needs review. [x] Demonstrates/verbalizes HEP/Ed previously given. Plan: [x] Continue per plan of care.    [x] Other: LEFS administration      Time In: 11:00 a Time Out: 11:40 a  Electronically signed by:  Pee Monae PT

## 2017-11-09 ENCOUNTER — HOSPITAL ENCOUNTER (OUTPATIENT)
Dept: PHYSICAL THERAPY | Facility: CLINIC | Age: 62
Setting detail: THERAPIES SERIES
Discharge: HOME | End: 2017-11-09
Payer: COMMERCIAL

## 2017-11-09 PROCEDURE — G0283 ELEC STIM OTHER THAN WOUND: HCPCS

## 2017-11-09 PROCEDURE — 97110 THERAPEUTIC EXERCISES: CPT

## 2017-11-09 NOTE — FLOWSHEET NOTE
trigger point release- demo'd with pt for 5 min and had her practice with therapist supervision; taught massage/trigger point release with good follow through 10/26     Standing back extension stretch 5x15\"  Education for HEP; dc to hep    Sitting back extension stretch 2x15\"  Education for HEP; dc to hep     Posterior Pelvic Tilt  20x 5 s hold  increased reps 10/26; performed 15 reps today 10/30 X   PPT with marching  20x   increased reps 10/26 X   PPT with walk outs 10x   increased reps 10/26 X   HS stretch b/l  4x20\"    stool     Piriformis stretch b/l  4x20\"    Sitting; d/c to HEP performed knee to opp shoulder 10/26 with good tolerance  x   Gastroc stretch b/l   20 s x 3   Added 10/30;wedge- taught technique for HEP with use of stairs  x   IT band stretch b/l  3x20\"     X   Clams  30x  Red TB Increased reps 10/9     SLR           LTR 10x , 5s hold    Added 10/26; 5 ea side  X   FAITH stretch 15 s x4    added 10/30 x          PRONE         Prone lying    5 mins   No pillow added     BENNY 45s x 2   ADDED REP 10/2    Press ups  10x2      Gluteal sets 20x5\"  Increased reps 10/9      HS curls 20x ea   lime green TB increased resistance 10/19; increased reps 10/26     Hip ext  15x ea Lime green TB SLR; increased resistance 10/19           Side-lying       Hip ABD with ER 10x  A Target the pirifromis; added 10/12    Reverse clams 15x  Red TB Added 10/26    Clams 15x  Red TB Added 10/26           Standing       4 way hip  15x ea  Red TB added 10/24; added hip add 11/2 and increased reps  X   Partial squats 15x   Cues for glut max activation/shifting weight onto heels; added 10/24  X   Fwd/Lateral step ups 15 ea  6 in Added 10/26  X    Hip hikes  15x ea  2 in step  Added 10/30; increased reps  X   Other:    11/9: LEFS completed by patient: 58/80: 33% impaired    Manual - Foam roll to B piriformis/IT band (focus on R) x 10 min- performed 11/9     TPR at insertion of piriformis 2 min x 2    Proximal to ischial tuberosity 2 improve walking mechanics and decrease stress on low back/R hip  MET    LTG: (to be met in 12 treatments) assessed 10/30 -   1. Pt to score less than 20% perceived disability with the LEFS. (30% perceived disability on Oswestry-will updated next session with LEFS)   2. Pt able to increase abdominal core strength to 3/5 for core stabilization to reduce low back pain. (PROGRESSING 2/5)     3. Pt reports <2/10 with aggravating activities. PROGRESSING (4/10)         Patient goals: to be pain free     Pt. Education:  [x] Yes  [] No  [x] Reviewed Prior HEP/Ed- provided Hip/standing HEP   Method of Education: [x] Verbal  [x] Demo  [x] Written  Comprehension of Education:  [x] Verbalizes understanding. [] Demonstrates understanding. [x] Needs review. [x] Demonstrates/verbalizes HEP/Ed previously given. Plan: [x] Continue per plan of care.    [x] Other: LEFS administration      Time In: 0900 a Time Out: 1005 a  Electronically signed by:  Ibeth Alaniz PTA

## 2017-11-10 ENCOUNTER — HOSPITAL ENCOUNTER (OUTPATIENT)
Dept: PHYSICAL THERAPY | Facility: CLINIC | Age: 62
Setting detail: THERAPIES SERIES
Discharge: HOME | End: 2017-11-10
Payer: COMMERCIAL

## 2017-11-10 PROCEDURE — 97110 THERAPEUTIC EXERCISES: CPT

## 2017-11-10 NOTE — FLOWSHEET NOTE
10/26     Standing back extension stretch 5x15\"  Education for HEP; dc to hep    Sitting back extension stretch 2x15\"  Education for HEP; dc to hep     Posterior Pelvic Tilt 20x 5 s hold  increased reps 10/26; performed 15 reps today 10/30 X   PPT with marching 20x   increased reps 10/26 X   PPT with walk outs 10x   increased reps 10/26 X   HS stretch b/l  4x20\"   Stool reviewed for HEP X   Piriformis stretch b/l  4x20\"    Sitting; d/c to HEP performed knee to opp shoulder 10/26 with good tolerance  X   Gastroc stretch b/l   20 s x 3   Added 10/30;wedge- taught technique for HEP with use of stairs  X   IT band stretch b/l  3x20\"     X   Clams  30x  Red TB Increased reps 10/9  X   SLR           LTR 10x , 5s hold    Added 10/26; 5 ea side  X   FAITH stretch 15s x4    added 10/30  Legs flat on mat 11/10/17 - start in low position moving up with each stretch  Reviewed for HEP X          PRONE         Prone lying    5 mins   No pillow added     BENNY 45s x 2   ADDED REP 10/2    Press ups  10x2      Gluteal sets 20x5\"  Increased reps 10/9      HS curls 20x ea   lime green TB increased resistance 10/19; increased reps 10/26     Hip ext  15x ea Lime green TB SLR; increased resistance 10/19           Side-lying       Hip ABD with ER 10x  A Target the pirifromis; added 10/12 X   Reverse clams 15x  Red TB Added 10/26 X   Clams 15x  Red TB Added 10/26 X          Standing       4 way hip  15x ea  Red TB added 10/24; added hip add 11/2 and increased reps  X   Partial squats 15x   Cues for glut max activation/shifting weight onto heels; added 10/24  X   Fwd/Lateral step ups 15x ea  6 in Added 10/26  X   Hip hikes  15x ea  2 in step  Added 10/30; increased reps  X   Other:      Manual - Foam roll to B piriformis/IT band (focus on R) x 10 min- declined 11/10/17    TPR at insertion of piriformis 2 min x 2    Proximal to ischial tuberosity 2 min x 2      Specific Instructions for next treatment:  add weight to supine/prone PRE's; bridges/bridge with hip abduction;        Treatment Charges: Mins Units   [x]  Modalities:CP/estim     [x]  Ther Exercise 45 3   []  Manual Therapy     []  Ther Activities     []  Aquatics     []  Vasocompression     []  Other- estim     Total Treatment time 45 3       Assessment: [x] Progressing toward goals: Thorough review of stretches for clinic use and as HEP, as pt was confused between diferent types of hip stretches (FAITH, figure 4, piriformis - knee to opposite shoulder, and hamstring stretches. Pt demos good recall of ex program with very little cueing required for appropriate strengthening exercises - moderate cueing for stretches. Patient declines manual and modalities as she just had them yesterday and was still feeling relief from them. [] No change. [] Other:     STG: (to be met in 8 treatments) (Start date: 9/26/17)-   1. ? Pain: <6/10 at worst with aggravating activities MET (1x at work, averaging 2/10, up to 4/10 with driving/prolonged sitting)    2. ? ROM: lumbar extension >20 deg, Side bending>  25 deg, flexion >70 deg all pain free  PROGRESSING 70 degrees, 20 deg to R, 25 deg to L , and 15 deg for extension NOT MET    a. (-) HS tightness b/l (MET on L, NOT MET ON R (9 deg deficit))   b. (-) elys test b/l (+ on R) Partially MET   3. ? Strength:>4/5 for BLE strength (era. Hip) - PARTIALLY met  4. ? Function:   a. able to sit for >10 min with no increase in sx/sx MET   b. Able to walk >30 min with no increase in sx/sx MET  5. Independent with Home Exercise Programs met   6. Corrected leg length discrepancy to improve walking mechanics and decrease stress on low back/R hip  MET    LTG: (to be met in 12 treatments) assessed 10/30 -   1. Pt to score less than 20% perceived disability with the LEFS. (30% perceived disability on Oswestry-will updated next session with LEFS)   2. Pt able to increase abdominal core strength to 3/5 for core stabilization to reduce low back pain.  (PROGRESSING 2/5)     3. Pt reports <2/10 with aggravating activities. PROGRESSING (4/10)         Patient goals: to be pain free     Pt. Education:  [x] Yes  [] No  [x] Reviewed Prior HEP/Ed- provided Hip/standing HEP   Method of Education: [x] Verbal  [x] Demo  [x] Written  Comprehension of Education:  [x] Verbalizes understanding. [] Demonstrates understanding. [x] Needs review. [x] Demonstrates/verbalizes HEP/Ed previously given. Plan: [x] Continue per plan of care.    [] Other:       Time In: 1502  Time Out: 1550    Electronically signed by:  Nora Harry PTA

## 2017-11-14 ENCOUNTER — HOSPITAL ENCOUNTER (OUTPATIENT)
Dept: PHYSICAL THERAPY | Facility: CLINIC | Age: 62
Setting detail: THERAPIES SERIES
Discharge: HOME | End: 2017-11-14
Payer: COMMERCIAL

## 2017-11-14 PROCEDURE — 97140 MANUAL THERAPY 1/> REGIONS: CPT

## 2017-11-14 PROCEDURE — G0283 ELEC STIM OTHER THAN WOUND: HCPCS

## 2017-11-14 PROCEDURE — 97110 THERAPEUTIC EXERCISES: CPT

## 2017-11-14 NOTE — FLOWSHEET NOTE
[] [facility]I [facility]       Outpatient Physical        Therapy       [address]       Phone: [phone]       Fax: [phone] [x] [facility] at [address] [facility]       Phone: [phone]       Fax: [phone] [] [facility]. [address] [place] [address]   Phone: [phone]   Fax:  [phone]     Physical Therapy Daily Treatment Note    Date:  2017  Patient Name:  Amador Flores    :  1955  MRN: [id number]   Physician: Stephan Francis MD                                                         Insurance: Medical [place]  Medical Diagnosis: Right sided sciatica, Bursitis of R hip             Rehab Codes: M54.31, M70.71   Onset Date: 17                                     Next 's appt.: 2017    Visit# / total visits: 15/18 (6 additional visits added)  Cancels/No Shows: 0/0    Subjective:    Pain:  [x] Yes  [] No Location:  R side buttock area    Pain Ratin/10(0-10 scale)  (post-sleeping)    Pain altered Tx:  [x] No  [x] Yes  Action  Comments: Pt reports she \"felt good yesterday\" but is reporting worsened pain today with 5/10 radiating pain into lateral thigh. Pt stated she feels that it is related to traveling and her mattress. Pt stated her mattress if over 27years old and she is looking into buying a foam topper or new mattress in hopes to reduce buttocks/radiaitng sx/sx in the AM when she awakes.         Objective:  Modalities: prone: IFC to low back, R piriformis and posterior thigh with CP along low back/posterior hip and CP along anterior hip x 20 min     - estim applied to piriformis/lateral hip/glute med (used 2x2 electrodes) avoiding rash as it is aggravated with heat x 20 min with IFC - declined 11/10/17    Precautions: limit Flexion based exercises secondary to increased pain/sx; significant improvement with repeated lumbar extension     Exercises: limited exercises stress on low back/R hip  MET    LTG: (to be met in 12 treatments) assessed 10/30 -   1. Pt to score less than 20% perceived disability with the LEFS. (30% perceived disability on Oswestry-will updated next session with LEFS)   2. Pt able to increase abdominal core strength to 3/5 for core stabilization to reduce low back pain. (PROGRESSING 2/5)     3. Pt reports <2/10 with aggravating activities. PROGRESSING (4/10)         Patient goals: to be pain free     Pt. Education:  [x] Yes  [] No  [x] Reviewed Prior HEP/Ed- provided Hip/standing HEP   Method of Education: [x] Verbal  [x] Demo  [x] Written  Comprehension of Education:  [x] Verbalizes understanding. [] Demonstrates understanding. [x] Needs review. [x] Demonstrates/verbalizes HEP/Ed previously given. Plan: [x] Continue per plan of care.    [] Other:       Time In: 8:00 a  Time Out: 9:05 a   Electronically signed by:  Aide Fernandez, PT

## 2017-11-16 ENCOUNTER — HOSPITAL ENCOUNTER (OUTPATIENT)
Dept: PHYSICAL THERAPY | Facility: CLINIC | Age: 62
Setting detail: THERAPIES SERIES
Discharge: HOME | End: 2017-11-16
Payer: COMMERCIAL

## 2017-11-16 PROCEDURE — 97140 MANUAL THERAPY 1/> REGIONS: CPT

## 2017-11-16 PROCEDURE — 97110 THERAPEUTIC EXERCISES: CPT

## 2017-11-16 NOTE — FLOWSHEET NOTE
[] Elizabeth Pantoja       Outpatient Physical        Therapy       [address]       Phone: [phone]       Fax: [phone] [x] [facility] at [address]       Phone: [phone]       Fax: [phone] [] [facility]. [address] [place] [address]   Phone: [phone]   Fax:  [phone]     Physical Therapy Daily Treatment Note    Date:  2017  Patient Name:  Neeru Baltazar    :  1955  MRN: [id number]   Physician: Uma Reddy MD                                                         Insurance: Medical [place]  Medical Diagnosis: Right sided sciatica, Bursitis of R hip             Rehab Codes: M54.31, M70.71   Onset Date: 17                                     Next 's appt.: 2017    Visit# / total visits:  (6 additional visits added)  Cancels/No Shows: 0/0    Subjective:    Pain:  [x] Yes  [] No Location:  R side buttock area    Pain Ratin/10(0-10 scale)  (post-sleeping)    Pain altered Tx:  [x] No  [x] Yes  Action  Comments: Feeling improvement using ice, stretching and using the stairs for relief. Worked 12 hrs yesterday with no increased pain. Objective:  Modalities: prone: IFC to low back, R piriformis and posterior thigh with CP along low back/posterior hip and CP along anterior hip x 20 min     - estim applied to piriformis/lateral hip/glute med (used 2x2 electrodes) avoiding rash as it is aggravated with heat x 20 min with IFC - declined 17    Precautions: limit Flexion based exercises secondary to increased pain/sx; significant improvement with repeated lumbar extension     Exercises: limited exercises [erformed d/t pt's sx being highly irritable   Exercise Reps/ Time Weight/ Level Comments Performed    Pt.  Education 8 min   Use of tennis ball for trigger point release- demo'd with pt for 5 min and had her practice with therapist supervision; taught massage/trigger point release with good follow through 10/26     Standing back extension stretch 5x15\"  Education for HEP; dc to hep x   Sitting back extension stretch 2x15\"  Education for HEP; dc to hep  x   Posterior Pelvic Tilt 20x 5 s hold  increased reps 10/26; performed 15 reps today 10/30 x   PPT with marching 20x   increased reps 10/26    PPT with walk outs 10x   increased reps 10/26    HS stretch b/l  4x20\"   Stool reviewed for HEP    Piriformis stretch b/l  4x20\"    Sitting; d/c to HEP performed knee to opp shoulder 10/26 with good tolerance     Gastroc stretch b/l   20 s x 3   Added 10/30;wedge- taught technique for HEP with use of stairs     IT band stretch b/l  3x20\"        Clams  30x  Red TB Increased reps 10/9     SLR           LTR 10x , 5s hold    Added 10/26; 5 ea side     FAITH stretch 15s x4    added 10/30  Legs flat on mat 11/10/17 - start in low position moving up with each stretch  Reviewed for HEP           PRONE         Prone lying    5 mins   No pillow added  x   BENNY 45s x 2   ADDED REP 10/2 x   Press ups  10x2   x   Gluteal sets 20x5\"  Increased reps 10/9  x    HS curls 20x ea   lime green TB increased resistance 10/19; increased reps 10/26     Hip ext  15x ea Lime green TB SLR; increased resistance 10/19           Side-lying       Hip ABD with ER 10x  A Target the pirifromis; added 10/12    Reverse clams 15x  LIme Green TB Added 10/26    Clams 15x  Lime Green TB Added 10/26           Standing       4 way hip  15x ea  Red TB added 10/24; added hip add 11/2 and increased reps  X   Partial squats 15x   Cues for glut max activation/shifting weight onto heels; added 10/24  X   Fwd/Lateral step ups 15x ea  6 in Added 10/26  X   Hip hikes  15x ea  2 in step  Added 10/30; increased reps in // bars  X   Other:      Manual - Foam roll to B piriformis/IT band (focus on R) x 5min   TPR at insertion of piriformis/obturator internus/ HS group (biceps/semitendinosus) origin at ischial       Patient goals: to be pain free     Pt. Education:  [x] Yes  [] No  [] Reviewed Prior HEP/Ed-  Method of Education: [x] Verbal  [x] Demo  [] Written 11.16.17 Discussed the use of self piriformis rolling using a 6\" foam roll to decrease pain. Also discussed a trial of an ice massage of the piriformis and IT band. Comprehension of Education:  [] Verbalizes understanding. [] Demonstrates understanding. [x] Needs review. [] Demonstrates/verbalizes HEP/Ed previously given. Plan: [x] Continue per plan of care.    [] Other:       Time In: 12:00 pm  Time Out: 1:08pm   Electronically signed by:  Octavia Pretty PTA

## 2017-11-21 ENCOUNTER — HOSPITAL ENCOUNTER (OUTPATIENT)
Dept: PHYSICAL THERAPY | Facility: CLINIC | Age: 62
Setting detail: THERAPIES SERIES
Discharge: HOME | End: 2017-11-21
Payer: COMMERCIAL

## 2017-11-21 PROCEDURE — 97110 THERAPEUTIC EXERCISES: CPT

## 2017-11-21 PROCEDURE — G0283 ELEC STIM OTHER THAN WOUND: HCPCS

## 2017-11-21 PROCEDURE — 97140 MANUAL THERAPY 1/> REGIONS: CPT

## 2017-11-21 NOTE — FLOWSHEET NOTE
glut max activation/shifting weight onto heels; added 10/24  X   Fwd/Lateral step ups 15x ea  6 in Added 10/26  X   Hip hikes  15x ea  2 in step  Added 10/30; increased reps in // bars  X   Other:    Pain report post tx: 1/10      Manual - Foam roll to B piriformis/IT band (focus on R) x 5min   TPR at insertion of piriformis/obturator internus/ HS group (biceps/semitendinosus) origin at ischial tuberosity 2 min x 2 ea   -Assessed leg length in standing with 1/4 inch heel lift with improved pelvic obliquity (will re-check next treatment session); R shoe      Specific Instructions for next treatment:  add weight to supine/prone PRE's; bridges/bridge with hip abduction;        Treatment Charges: Mins Units   [x]  Modalities:CP/estim 20/20 0/1   [x]  Ther Exercise 45 3   [x]  Manual Therapy 10 1   []  Ther Activities     []  Aquatics     []  Vasocompression     []  Other- estim     Total Treatment time 55 5       Assessment: [x] Progressing toward goals: Pt tolerated above exercise regimen with good carry over. Pt is very compliant with HEP and her sx/sx are improving with decreased episodes of buttocks pain per pt. Therapist will assess pt's progress next session and plan to d/c with extensive HEP. [] No change. [] Other:     STG: (to be met in 8 treatments) (Start date: 9/26/17)-   1. ? Pain: <6/10 at worst with aggravating activities MET (1x at work, averaging 2/10, up to 4/10 with driving/prolonged sitting)    2. ? ROM: lumbar extension >20 deg, Side bending>  25 deg, flexion >70 deg all pain free  PROGRESSING 70 degrees, 20 deg to R, 25 deg to L , and 15 deg for extension NOT MET    a. (-) HS tightness b/l (MET on L, NOT MET ON R (9 deg deficit))   b. (-) elys test b/l (+ on R) Partially MET   3. ? Strength:>4/5 for BLE strength (era. Hip) - PARTIALLY met  4. ? Function:   a. able to sit for >10 min with no increase in sx/sx MET   b. Able to walk >30 min with no increase in sx/sx MET  5.  Independent with Home Exercise Programs met   6. Corrected leg length discrepancy to improve walking mechanics and decrease stress on low back/R hip  MET    LTG: (to be met in 12 treatments) assessed 10/30 -   1. Pt to score less than 20% perceived disability with the LEFS. (30% perceived disability on Oswestry-will updated next session with LEFS)   2. Pt able to increase abdominal core strength to 3/5 for core stabilization to reduce low back pain. (PROGRESSING 2/5)     3. Pt reports <2/10 with aggravating activities. PROGRESSING (a few flare ups- 5-6/10)         Patient goals: to be pain free     Pt. Education:  [x] Yes  [] No  [] Reviewed Prior HEP/Ed-  Method of Education: [x] Verbal  [x] Demo  [] Written 11.16.17 Discussed the use of self piriformis rolling using a 6\" foam roll to decrease pain. Also discussed a trial of an ice massage of the piriformis and IT band. Comprehension of Education:  [] Verbalizes understanding. [] Demonstrates understanding. [x] Needs review. [] Demonstrates/verbalizes HEP/Ed previously given. Plan: [x] Continue per plan of care.    [x] Other: at homes TENS unit; d/c next visit       Time In: 12:00 pm  Time Out: 1:08pm   Electronically signed by:  Mercedes Johnson PT

## 2017-11-28 ENCOUNTER — HOSPITAL ENCOUNTER (OUTPATIENT)
Dept: PHYSICAL THERAPY | Facility: CLINIC | Age: 62
Setting detail: THERAPIES SERIES
Discharge: HOME | End: 2017-11-28
Payer: COMMERCIAL

## 2017-11-28 PROCEDURE — 97110 THERAPEUTIC EXERCISES: CPT

## 2017-11-28 PROCEDURE — G0283 ELEC STIM OTHER THAN WOUND: HCPCS

## 2017-11-28 NOTE — DISCHARGE SUMMARY
clinic/primary PT if she has any questions/concerns in the future.       STG: (to be met in 8 treatments) (Start date: 9/26/17)- updated 11/28  1. ? Pain: <6/10 at worst with aggravating activities MET  2. ? ROM: lumbar extension >20 deg, Side bending>  25 deg, flexion >70 deg all pain free  Partially MET  a. 25 deg Lumbar extension, 55 Lumbar flexion  b. 20 deg on L SB; R SB 15 deg   c. (-) HS tightness b/l (MET on L, NOT MET ON R (10 deg deficit))   d. (-) elys test b/l (+ on R) MET   3. ? Strength:>4/5 for BLE strength (era. Hip) - MET  a. Ankle DF B/L 5/5  b. Knee Flexion B/L 4+/5  1. Extension 5/5  c. Hip Flexion B/L 4+/5  1. Abduction 4/5  2. Adduction 4/5  4. ? Function:   a. able to sit for >10 min with no increase in sx/sx MET   b. Able to walk >30 min with no increase in sx/sx MET  5. Independent with Home Exercise Programs MET   6. Corrected leg length discrepancy to improve walking mechanics and decrease stress on low back/R hip  MET     LTG: (to be met in 12 treatments) assessed 10/30 -   1. Pt to score less than 20% perceived disability with the LEFS. (18.75% LEFS) MET   2. Pt able to increase abdominal core strength to 3/5 for core stabilization to reduce low back pain. NOT MET   3. Pt reports <2/10 with aggravating activities. MET        Treatment to Date:  [x] Therapeutic Exercise    [x] Modalities:  [] Therapeutic Activity    [] Ultrasound  [x] Electrical Stimulation  [] Gait Training     [] Massage       [] Lumbar/Cervical Traction  [] Neuromuscular Re-education [x] Cold/hotpack [] Iontophoresis: 4 mg/mL  [x] Instruction in Home Exercise Program                     Dexamethasone Sodium  [x] Manual Therapy             Phosphate 40-80 mAmin  [] Aquatic Therapy                   [x] Vasocompression/    [] Other:             Game Ready    Discharge Status:     [x] Pt recovered from conditions. Treatment goals were met. [x] Pt received maximum benefit.  No further therapy indicated at this time.    [x] Pt to continue exercise/home instructions independently. Electronically signed by: Chris Rosales PT, DPT     If you have any questions or concerns, please don't hesitate to call.   Thank you for your referral.

## 2017-11-28 NOTE — FLOWSHEET NOTE
[] Elizabeth Pantoja       Outpatient Physical        Therapy       [address]       Phone: [phone]       Fax: [phone] [x] [facility] at [address] [facility]       Phone: [phone]       Fax: [phone] [] [place]. [address] [place] [address]   Phone: [phone]   Fax:  [phone]     Physical Therapy Daily Treatment Note    Date:  2017  Patient Name:  Neeru Baltazar    :  1955  MRN: [id number]   Physician: Uma Reddy MD                                                         Insurance: [employer] [place]  Medical Diagnosis: Right sided sciatica, Bursitis of R hip             Rehab Codes: added : M54.31, M70.71, M25.55, M25.64, M54.41, M79.604, M62.55, R29.3,    Onset Date: 17                                     Next 's appt.: 2017    Visit# / total visits:   Cancels/No Shows: 0/0    Subjective:    Pain:  [x] Yes  [] No Location:  R side buttock area-radiating into thigh    Pain Rating: 3/10(0-10 scale)  (post-sleeping)    Pain altered Tx:  [x] No  [x] Yes  Action    Comments: Pt reports she had a few days in a row with complete relief. Pt reports in past 2 weeks she experiences 1 flare up after she received a massage last week. Pt states she is \"happy\"with her progress and feels confident in her HEP and carrying out her exercises at home. Pt stated she has the order for the TENS unit.       Objective:  Modalities: prone: IFC to low back, R piriformis and posterior thigh with CP along low back/posterior hip and CP along anterior hip x 20 min     - estim applied to piriformis/lateral hip/glute med (used 2x2 electrodes) avoiding rash as it is aggravated with heat x 20 min with IFC - declined 17    Precautions: limit Flexion based exercises secondary to increased pain/sx; significant improvement with repeated lumbar extension     Exercises: Exercise Reps/ Time Weight/ Level Comments Performed    Pt.  Education 8 min   Use of tennis ball for trigger point release- larisa'd with pt for 5 min and had her practice with therapist supervision; taught massage/trigger point release with good follow through 10/26     Standing back extension stretch 5x15\"  Education for HEP; dc to hep    Sitting back extension stretch 2x15\"  Education for HEP; dc to hep     Posterior Pelvic Tilt 20x 5 s hold  increased reps 10/26; performed 15 reps today 10/30 x   PPT with marching 20x   increased reps 10/26 x   PPT with walk outs 10x   increased reps 10/26 x   bridges 20x   Added 11/21     HS stretch b/l  4x20\"   Stool reviewed for HEP    Piriformis stretch b/l  4x20\"    Sitting; d/c to HEP performed knee to opp shoulder 10/26 with good tolerance; knee to opposite shoulder 11/21    Gastroc stretch b/l   20 s x 3   Added 10/30;wedge- taught technique for HEP with use of stairs     IT band stretch b/l  3x20\"        Clams  30x  Red TB Increased reps 10/9     SLR           LTR 10x , 5s hold    Added 10/26; 5 ea side     FAITH stretch  4x20\"     added 10/30  Legs flat on mat 11/10/17 - start in low position moving up with each stretch  Reviewed for HEP           PRONE         Prone lying    5 mins   No pillow added     BENNY 45s x 2   ADDED REP 10/2    Press ups  10x2      Gluteal sets 20x5\"  Increased reps 10/9      HS curls 20x ea   lime green TB increased resistance 10/19; increased reps 10/26     Hip ext  15x ea Lime green TB SLR; increased resistance 10/19           Side-lying       Hip ABD with ER 10x  A Target the pirifromis; added 10/12 x   Reverse clams 15x  LIme Green TB Added 10/26 x   Clams 15x  Lime Green TB Added 10/26 x          Standing       4 way hip  15x ea  Red TB added 10/24; added hip add 11/2 and increased reps  x   Partial squats 15x   Cues for glut max activation/shifting weight onto heels; added 10/24     Fwd/Lateral step ups 15x ea  6 in Added 10/26  x   Hip Programs MET   6. Corrected leg length discrepancy to improve walking mechanics and decrease stress on low back/R hip  MET    LTG: (to be met in 12 treatments) assessed 10/30 -   1. Pt to score less than 20% perceived disability with the LEFS. (18.75% LEFS) MET   2. Pt able to increase abdominal core strength to 3/5 for core stabilization to reduce low back pain. NOT MET   3. Pt reports <2/10 with aggravating activities. MET         Patient goals: to be pain free     Pt. Education:  [x] Yes  [] No  [] Reviewed Prior HEP/Ed-  Method of Education: [x] Verbal  [] Demo  [] Written 11.16.17 Discussed the use of self piriformis rolling using a 6\" foam roll to decrease pain. Also discussed a trial of an ice massage of the piriformis and IT band. Comprehension of Education:   [] Verbalizes understanding. [] Demonstrates understanding. [x] Needs review. [] Demonstrates/verbalizes HEP/Ed previously given. Plan: [x] Continue per plan of care.    [x] Other: at homes TENS unit; d/c next visit       Time In: 7:00a  Time Out: 8:15a   Electronically signed by:  Maribel Edwards, PT

## 2017-12-07 ENCOUNTER — HOSPITAL ENCOUNTER (OUTPATIENT)
Age: 62
Setting detail: SPECIMEN
Discharge: HOME | End: 2017-12-07
Payer: COMMERCIAL

## 2017-12-11 LAB — DERMATOLOGY PATHOLOGY REPORT: NORMAL

## 2017-12-13 ENCOUNTER — HOSPITAL ENCOUNTER (OUTPATIENT)
Age: 62
Setting detail: SPECIMEN
Discharge: HOME | End: 2017-12-13
Payer: COMMERCIAL

## 2017-12-13 LAB
-: ABNORMAL
AMORPHOUS: ABNORMAL
BACTERIA: ABNORMAL
BILIRUBIN URINE: NEGATIVE
CASTS UA: ABNORMAL /LPF (ref 0–8)
COLOR: YELLOW
CRYSTALS, UA: ABNORMAL /HPF
EPITHELIAL CELLS UA: ABNORMAL /HPF (ref 0–5)
GLUCOSE URINE: NEGATIVE
KETONES, URINE: NEGATIVE
LEUKOCYTE ESTERASE, URINE: NEGATIVE
MUCUS: ABNORMAL
NITRITE, URINE: NEGATIVE
OTHER OBSERVATIONS UA: ABNORMAL
PH UA: 5.5 (ref 5–8)
PROTEIN UA: NEGATIVE
RBC UA: ABNORMAL /HPF (ref 0–2)
RENAL EPITHELIAL, UA: ABNORMAL /HPF
SPECIFIC GRAVITY UA: 1.03 (ref 1–1.03)
TRICHOMONAS: ABNORMAL
TURBIDITY: ABNORMAL
URINE HGB: ABNORMAL
UROBILINOGEN, URINE: NORMAL
WBC UA: ABNORMAL /HPF (ref 0–5)
YEAST: ABNORMAL

## 2017-12-18 ENCOUNTER — TELEPHONE (OUTPATIENT)
Dept: ORTHOPEDIC SURGERY | Age: 62
End: 2017-12-18

## 2017-12-18 NOTE — TELEPHONE ENCOUNTER
Eliazar Agent asking if we can see pt. sooner than 1/5 at [facility]. ..someone you know? R knee-no xr or MRI. Pain and swelling. Already ok'd per . Changed pao't to 12/22/17 unless pt. calls back with any conflict.

## 2017-12-22 ENCOUNTER — OFFICE VISIT (OUTPATIENT)
Dept: ORTHOPEDIC SURGERY | Age: 62
End: 2017-12-22
Payer: COMMERCIAL

## 2017-12-22 ENCOUNTER — HOSPITAL ENCOUNTER (OUTPATIENT)
Dept: CT IMAGING | Facility: CLINIC | Age: 62
Discharge: HOME | End: 2017-12-22
Payer: COMMERCIAL

## 2017-12-22 VITALS — RESPIRATION RATE: 18 BRPM | BODY MASS INDEX: 30.3 KG/M2 | HEART RATE: 88 BPM | HEIGHT: 63 IN | WEIGHT: 171 LBS

## 2017-12-22 DIAGNOSIS — M25.561 CHRONIC PAIN OF RIGHT KNEE: Primary | ICD-10-CM

## 2017-12-22 DIAGNOSIS — R31.0 GROSS HEMATURIA: ICD-10-CM

## 2017-12-22 DIAGNOSIS — G89.29 CHRONIC PAIN OF RIGHT KNEE: Primary | ICD-10-CM

## 2017-12-22 PROCEDURE — 74176 CT ABD & PELVIS W/O CONTRAST: CPT

## 2017-12-22 PROCEDURE — 99203 OFFICE O/P NEW LOW 30 MIN: CPT | Performed by: ORTHOPAEDIC SURGERY

## 2017-12-22 RX ORDER — IBUPROFEN 600 MG/1
600 TABLET ORAL EVERY 6 HOURS PRN
COMMUNITY
End: 2018-01-03 | Stop reason: DRUGHIGH

## 2017-12-26 ENCOUNTER — HOSPITAL ENCOUNTER (OUTPATIENT)
Age: 62
Setting detail: OUTPATIENT SURGERY
Discharge: HOME | End: 2017-12-26
Attending: ORTHOPAEDIC SURGERY | Admitting: ORTHOPAEDIC SURGERY
Payer: COMMERCIAL

## 2017-12-26 ENCOUNTER — ANESTHESIA (OUTPATIENT)
Dept: OPERATING ROOM | Age: 62
End: 2017-12-26
Payer: COMMERCIAL

## 2017-12-26 ENCOUNTER — ANESTHESIA EVENT (OUTPATIENT)
Dept: OPERATING ROOM | Age: 62
End: 2017-12-26
Payer: COMMERCIAL

## 2017-12-26 VITALS
RESPIRATION RATE: 16 BRPM | TEMPERATURE: 97.9 F | SYSTOLIC BLOOD PRESSURE: 154 MMHG | HEIGHT: 63 IN | HEART RATE: 77 BPM | WEIGHT: 171 LBS | DIASTOLIC BLOOD PRESSURE: 77 MMHG | OXYGEN SATURATION: 93 % | BODY MASS INDEX: 30.3 KG/M2

## 2017-12-26 VITALS — OXYGEN SATURATION: 98 % | SYSTOLIC BLOOD PRESSURE: 133 MMHG | DIASTOLIC BLOOD PRESSURE: 71 MMHG

## 2017-12-26 LAB
GLUCOSE BLD-MCNC: 101 MG/DL (ref 65–105)
GLUCOSE BLD-MCNC: 112 MG/DL (ref 65–105)

## 2017-12-26 PROCEDURE — 7100000011 HC PHASE II RECOVERY - ADDTL 15 MIN: Performed by: ORTHOPAEDIC SURGERY

## 2017-12-26 PROCEDURE — 6370000000 HC RX 637 (ALT 250 FOR IP): Performed by: ANESTHESIOLOGY

## 2017-12-26 PROCEDURE — 6360000002 HC RX W HCPCS: Performed by: ORTHOPAEDIC SURGERY

## 2017-12-26 PROCEDURE — 7100000001 HC PACU RECOVERY - ADDTL 15 MIN: Performed by: ORTHOPAEDIC SURGERY

## 2017-12-26 PROCEDURE — 82947 ASSAY GLUCOSE BLOOD QUANT: CPT

## 2017-12-26 PROCEDURE — 2500000003 HC RX 250 WO HCPCS: Performed by: NURSE ANESTHETIST, CERTIFIED REGISTERED

## 2017-12-26 PROCEDURE — 2580000003 HC RX 258: Performed by: NURSE ANESTHETIST, CERTIFIED REGISTERED

## 2017-12-26 PROCEDURE — 7100000030 HC ASPR PHASE II RECOVERY - FIRST 15 MIN: Performed by: ORTHOPAEDIC SURGERY

## 2017-12-26 PROCEDURE — 3700000001 HC ADD 15 MINUTES (ANESTHESIA): Performed by: ORTHOPAEDIC SURGERY

## 2017-12-26 PROCEDURE — 3600000002 HC SURGERY LEVEL 2 BASE: Performed by: ORTHOPAEDIC SURGERY

## 2017-12-26 PROCEDURE — 2720000010 HC SURG SUPPLY STERILE: Performed by: ORTHOPAEDIC SURGERY

## 2017-12-26 PROCEDURE — 93005 ELECTROCARDIOGRAM TRACING: CPT

## 2017-12-26 PROCEDURE — 6360000002 HC RX W HCPCS: Performed by: NURSE ANESTHETIST, CERTIFIED REGISTERED

## 2017-12-26 PROCEDURE — 7100000000 HC PACU RECOVERY - FIRST 15 MIN: Performed by: ORTHOPAEDIC SURGERY

## 2017-12-26 PROCEDURE — 3600000012 HC SURGERY LEVEL 2 ADDTL 15MIN: Performed by: ORTHOPAEDIC SURGERY

## 2017-12-26 PROCEDURE — 2580000003 HC RX 258: Performed by: ANESTHESIOLOGY

## 2017-12-26 PROCEDURE — 7100000010 HC PHASE II RECOVERY - FIRST 15 MIN: Performed by: ORTHOPAEDIC SURGERY

## 2017-12-26 PROCEDURE — 3700000000 HC ANESTHESIA ATTENDED CARE: Performed by: ORTHOPAEDIC SURGERY

## 2017-12-26 PROCEDURE — 7100000031 HC ASPR PHASE II RECOVERY - ADDTL 15 MIN: Performed by: ORTHOPAEDIC SURGERY

## 2017-12-26 RX ORDER — DEXAMETHASONE SODIUM PHOSPHATE 4 MG/ML
INJECTION, SOLUTION INTRA-ARTICULAR; INTRALESIONAL; INTRAMUSCULAR; INTRAVENOUS; SOFT TISSUE PRN
Status: DISCONTINUED | OUTPATIENT
Start: 2017-12-26 | End: 2017-12-26 | Stop reason: SDUPTHER

## 2017-12-26 RX ORDER — FENTANYL CITRATE 50 UG/ML
INJECTION, SOLUTION INTRAMUSCULAR; INTRAVENOUS PRN
Status: DISCONTINUED | OUTPATIENT
Start: 2017-12-26 | End: 2017-12-26 | Stop reason: SDUPTHER

## 2017-12-26 RX ORDER — HYDROCODONE BITARTRATE AND ACETAMINOPHEN 5; 325 MG/1; MG/1
1 TABLET ORAL ONCE
Status: COMPLETED | OUTPATIENT
Start: 2017-12-26 | End: 2017-12-26

## 2017-12-26 RX ORDER — LABETALOL HYDROCHLORIDE 5 MG/ML
5 INJECTION, SOLUTION INTRAVENOUS EVERY 10 MIN PRN
Status: DISCONTINUED | OUTPATIENT
Start: 2017-12-26 | End: 2017-12-26 | Stop reason: HOSPADM

## 2017-12-26 RX ORDER — ONDANSETRON 2 MG/ML
INJECTION INTRAMUSCULAR; INTRAVENOUS PRN
Status: DISCONTINUED | OUTPATIENT
Start: 2017-12-26 | End: 2017-12-26 | Stop reason: SDUPTHER

## 2017-12-26 RX ORDER — SODIUM CHLORIDE 9 MG/ML
INJECTION, SOLUTION INTRAVENOUS CONTINUOUS
Status: DISCONTINUED | OUTPATIENT
Start: 2017-12-26 | End: 2017-12-26 | Stop reason: HOSPADM

## 2017-12-26 RX ORDER — ACETAMINOPHEN 500 MG
500 TABLET ORAL ONCE
COMMUNITY
End: 2019-07-11

## 2017-12-26 RX ORDER — ONDANSETRON 2 MG/ML
4 INJECTION INTRAMUSCULAR; INTRAVENOUS
Status: DISCONTINUED | OUTPATIENT
Start: 2017-12-26 | End: 2017-12-26 | Stop reason: HOSPADM

## 2017-12-26 RX ORDER — MIDAZOLAM HYDROCHLORIDE 1 MG/ML
INJECTION INTRAMUSCULAR; INTRAVENOUS PRN
Status: DISCONTINUED | OUTPATIENT
Start: 2017-12-26 | End: 2017-12-26 | Stop reason: SDUPTHER

## 2017-12-26 RX ORDER — PROPOFOL 10 MG/ML
INJECTION, EMULSION INTRAVENOUS PRN
Status: DISCONTINUED | OUTPATIENT
Start: 2017-12-26 | End: 2017-12-26 | Stop reason: SDUPTHER

## 2017-12-26 RX ORDER — DIPHENHYDRAMINE HYDROCHLORIDE 50 MG/ML
12.5 INJECTION INTRAMUSCULAR; INTRAVENOUS
Status: DISCONTINUED | OUTPATIENT
Start: 2017-12-26 | End: 2017-12-26 | Stop reason: HOSPADM

## 2017-12-26 RX ORDER — LIDOCAINE HYDROCHLORIDE 10 MG/ML
INJECTION, SOLUTION EPIDURAL; INFILTRATION; INTRACAUDAL; PERINEURAL PRN
Status: DISCONTINUED | OUTPATIENT
Start: 2017-12-26 | End: 2017-12-26 | Stop reason: SDUPTHER

## 2017-12-26 RX ORDER — ROPIVACAINE HYDROCHLORIDE 2 MG/ML
INJECTION, SOLUTION EPIDURAL; INFILTRATION; PERINEURAL PRN
Status: DISCONTINUED | OUTPATIENT
Start: 2017-12-26 | End: 2017-12-26 | Stop reason: HOSPADM

## 2017-12-26 RX ORDER — MORPHINE SULFATE 8 MG/ML
2 INJECTION, SOLUTION INTRAMUSCULAR; INTRAVENOUS EVERY 5 MIN PRN
Status: DISCONTINUED | OUTPATIENT
Start: 2017-12-26 | End: 2017-12-26 | Stop reason: HOSPADM

## 2017-12-26 RX ORDER — MEPERIDINE HYDROCHLORIDE 50 MG/ML
12.5 INJECTION INTRAMUSCULAR; INTRAVENOUS; SUBCUTANEOUS EVERY 5 MIN PRN
Status: DISCONTINUED | OUTPATIENT
Start: 2017-12-26 | End: 2017-12-26 | Stop reason: HOSPADM

## 2017-12-26 RX ORDER — HYDROMORPHONE HCL 110MG/55ML
0.5 PATIENT CONTROLLED ANALGESIA SYRINGE INTRAVENOUS EVERY 5 MIN PRN
Status: DISCONTINUED | OUTPATIENT
Start: 2017-12-26 | End: 2017-12-26 | Stop reason: HOSPADM

## 2017-12-26 RX ORDER — SODIUM CHLORIDE, SODIUM LACTATE, POTASSIUM CHLORIDE, CALCIUM CHLORIDE 600; 310; 30; 20 MG/100ML; MG/100ML; MG/100ML; MG/100ML
INJECTION, SOLUTION INTRAVENOUS CONTINUOUS PRN
Status: DISCONTINUED | OUTPATIENT
Start: 2017-12-26 | End: 2017-12-26 | Stop reason: SDUPTHER

## 2017-12-26 RX ORDER — HYDROCODONE BITARTRATE AND ACETAMINOPHEN 7.5; 325 MG/1; MG/1
1 TABLET ORAL EVERY 6 HOURS PRN
Qty: 20 TABLET | Refills: 0 | Status: SHIPPED | OUTPATIENT
Start: 2017-12-26 | End: 2018-01-02

## 2017-12-26 RX ADMIN — ONDANSETRON 4 MG: 2 INJECTION INTRAMUSCULAR; INTRAVENOUS at 11:43

## 2017-12-26 RX ADMIN — HYDROCODONE BITARTRATE AND ACETAMINOPHEN 1 TABLET: 5; 325 TABLET ORAL at 13:46

## 2017-12-26 RX ADMIN — SODIUM CHLORIDE, POTASSIUM CHLORIDE, SODIUM LACTATE AND CALCIUM CHLORIDE: 600; 310; 30; 20 INJECTION, SOLUTION INTRAVENOUS at 11:18

## 2017-12-26 RX ADMIN — MIDAZOLAM 2 MG: 1 INJECTION INTRAMUSCULAR; INTRAVENOUS at 11:18

## 2017-12-26 RX ADMIN — DEXAMETHASONE SODIUM PHOSPHATE 4 MG: 4 INJECTION, SOLUTION INTRAMUSCULAR; INTRAVENOUS at 11:43

## 2017-12-26 RX ADMIN — PROPOFOL 200 MG: 10 INJECTION, EMULSION INTRAVENOUS at 11:23

## 2017-12-26 RX ADMIN — FENTANYL CITRATE 100 MCG: 50 INJECTION, SOLUTION INTRAMUSCULAR; INTRAVENOUS at 11:23

## 2017-12-26 RX ADMIN — LIDOCAINE HYDROCHLORIDE 50 MG: 10 INJECTION, SOLUTION EPIDURAL; INFILTRATION; INTRACAUDAL; PERINEURAL at 11:23

## 2017-12-26 RX ADMIN — SODIUM CHLORIDE: 9 INJECTION, SOLUTION INTRAVENOUS at 10:54

## 2017-12-26 ASSESSMENT — PULMONARY FUNCTION TESTS
PIF_VALUE: 24
PIF_VALUE: 16
PIF_VALUE: 10
PIF_VALUE: 10
PIF_VALUE: 1
PIF_VALUE: 12
PIF_VALUE: 7
PIF_VALUE: 25
PIF_VALUE: 1
PIF_VALUE: 10
PIF_VALUE: 0
PIF_VALUE: 13
PIF_VALUE: 24
PIF_VALUE: 11
PIF_VALUE: 10
PIF_VALUE: 10
PIF_VALUE: 13
PIF_VALUE: 0
PIF_VALUE: 10
PIF_VALUE: 11
PIF_VALUE: 13
PIF_VALUE: 13
PIF_VALUE: 12
PIF_VALUE: 11
PIF_VALUE: 3
PIF_VALUE: 10
PIF_VALUE: 25
PIF_VALUE: 0

## 2017-12-26 ASSESSMENT — PAIN DESCRIPTION - DESCRIPTORS
DESCRIPTORS: BURNING
DESCRIPTORS: BURNING

## 2017-12-26 ASSESSMENT — PAIN SCALES - GENERAL
PAINLEVEL_OUTOF10: 4

## 2017-12-26 ASSESSMENT — PAIN DESCRIPTION - FREQUENCY
FREQUENCY: CONTINUOUS
FREQUENCY: CONTINUOUS

## 2017-12-26 ASSESSMENT — PAIN DESCRIPTION - PAIN TYPE
TYPE: SURGICAL PAIN
TYPE: SURGICAL PAIN

## 2017-12-26 ASSESSMENT — PAIN - FUNCTIONAL ASSESSMENT: PAIN_FUNCTIONAL_ASSESSMENT: 0-10

## 2017-12-26 ASSESSMENT — ENCOUNTER SYMPTOMS
STRIDOR: 0
SHORTNESS OF BREATH: 0

## 2017-12-26 ASSESSMENT — LIFESTYLE VARIABLES: SMOKING_STATUS: 0

## 2017-12-26 ASSESSMENT — PAIN DESCRIPTION - ORIENTATION
ORIENTATION: RIGHT
ORIENTATION: RIGHT

## 2017-12-26 ASSESSMENT — PAIN DESCRIPTION - LOCATION
LOCATION: KNEE
LOCATION: KNEE

## 2017-12-26 NOTE — OP NOTE
Operative dictation  Preop diagnosis: Medial meniscus tear right knee  Postop diagnosis: Same  Procedure: Arthroscopic examination right knee with partial medial meniscectomy    Surgeon Erick Marroquin  Assistant none  Anesthesia: Gen. Olga Wilks is a 77-year-old patient who presented my office with a 6 month history of catching or locking sensation of her right knee. Patient was noted have a very positive Thomas's. He felt she benefit from arthroscopy and consent was obtained good comprehension of all risks and benefits    Patient was taken operating room had successful induction of general anesthesia. A tourniquet was applied to right thigh. She was then placed a leg zambrano. The leg was exsanguinated and the tourniquet inflated to 300 mmHg. Medial lateral portals were established and scope was inserted patellofemoral joint was unremarkable. In the past scope of the medial gutter and medial compartment where upon probing a large flap tear of the body in posterior horn of the medial meniscus could be identified and brought into the joint partial medial meniscectomy is carried out with straight basket forceps at the flap portion this was then subsequently removed with a 4.0 tomcat. We also trimmed up the upper leaflet of the posterior horn which was unstable this was then smooth out the tap tomcat. 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 Laterally the ACL was intact. The lateral compartment was entered and upon probing noted that the articular service was excellent as was the lateral meniscus upon thorough probing. The scope and shaver the past back into the suprapatellar area and the shaver was used to remove any further soft tissue debris. The arthroscopic equivalent was removed and the knee evacuated of any fluid and then still with 20 mL 0.2% ropivacaine. The portals closed with 4-0 nylon suture.   Sterile dressings were applied Rolando large 6 inch Ace bandage total thigh.   Tourniquet time was less than 30 minutes and patient taken to post anesthesia care unit in condition

## 2017-12-26 NOTE — ANESTHESIA POSTPROCEDURE EVALUATION
POST- ANESTHESIA EVALUATION       Pt Name: Cheyanne Wilkes  MRN: [id number]  YOB: 1955  Date of evaluation: 12/26/2017  Time:  3:21 PM      BP (!) 154/77 Comment: Pt reports she will take b/p med when she gets home  Pulse 77   Temp 97.9 °F (36.6 °C) (Temporal)   Resp 16   Ht 5' 2.5\" (1.588 m)   Wt 171 lb (77.6 kg)   SpO2 93%   BMI 30.78 kg/m²      Consciousness Level  Awake  Cardiopulmonary Status  Stable  Pain Adequately Treated YES  Nausea / Vomiting  NO  Adequate Hydration  YES  Anesthesia Related Complications NONE      Electronically signed by Marcellus Lopez MD on 12/26/2017 at 3:21 PM       Department of Anesthesiology  Postprocedure Note    Patient: Cheyanne Wilkes  MRN: [id number]  YOB: 1955  Date of evaluation: 12/26/2017  Time:  3:20 PM     Procedure Summary     Date:  12/26/17 Room / Location:  [address] [place] [address] Megha Chávez 03 / [address]    Anesthesia Start:  1118 Anesthesia Stop:  1159    Procedure:  ARTHROSCOPY KNEE (Right Knee) Diagnosis:  (MEDIAL MENISCUS TEAR RIGHT KNEE PAT ON ADMIT PER ANES )    Surgeon:  Chika Oliver MD Responsible Provider:  Meredith Mohr MD    Anesthesia Type:  general ASA Status:  3          Anesthesia Type: general    Home Phase I: Ohme Score: 9    Home Phase II: Home Score: 10    Last vitals: Reviewed and per EMR flowsheets.        Anesthesia Post Evaluation

## 2017-12-26 NOTE — ANESTHESIA PRE PROCEDURE
Department of Anesthesiology  Preprocedure Note       Name:  Josue Almaraz   Age:  60 y.o.  :  1955                                          MRN:  [id number]         Date:  2017      Surgeon: Gin Hernández):  Juan Luis Trejo MD    Procedure: Procedure(s):  ARTHROSCOPY KNEE    Medications prior to admission:   Prior to Admission medications    Medication Sig Start Date End Date Taking? Authorizing Provider   ibuprofen (ADVIL;MOTRIN) 600 MG tablet Take 600 mg by mouth every 6 hours as needed for Pain Taking 200 mgm tabs x 3 about 7 hrs. apart    Historical Provider, MD   valsartan (DIOVAN) 160 MG tablet Take 160 mg by mouth daily    Historical Provider, MD   citalopram (CELEXA) 20 MG tablet Take 20 mg by mouth daily    Historical Provider, MD   aspirin 81 MG tablet Take 81 mg by mouth daily    Historical Provider, MD   levothyroxine (SYNTHROID) 50 MCG tablet Take 50 mcg by mouth Daily. Historical Provider, MD   fenofibrate (TRICOR) 145 MG tablet Take 145 mg by mouth daily. Historical Provider, MD       Current medications:    No current facility-administered medications for this encounter. Allergies: Allergies   Allergen Reactions    Penicillins        Problem List:  There is no problem list on file for this patient. Past Medical History:        Diagnosis Date    Diabetes mellitus ([address]     Gall stones     Hypertension     Hypothyroidism     Kidney stones        Past Surgical History:        Procedure Laterality Date    CHOLECYSTECTOMY         Social History:    Social History   Substance Use Topics    Smoking status: Never Smoker    Smokeless tobacco: Never Used    Alcohol use No                                Counseling given: Not Answered      Vital Signs (Current): There were no vitals filed for this visit.                                            BP Readings from Last 3 Encounters:   17 134/78   16 (!) 160/94   14 148/87       NPO Status:

## 2017-12-26 NOTE — H&P
HISTORY and [facility] [address]       NAME:  Taylor Ulloa  MRN: [id number]   YOB: 1955   Date: 12/26/2017   Age: 60 y.o. Gender: female       COMPLAINT AND PRESENT HISTORY:     Taylor Ulloa is 60 y.o.,  female, is here today for Rt knee arthroscopy. Patient C/O of pain, swelling in the right knee. Pt injured knee while gardening in 6/2017. Pt describes the pain as 4/10 in intensity at times, aching and sharp in character, no radiation. The knee did buckle or give way under the patient shortly after injured but not recently. No stiffness, popping and cracking, locking up. No recent falls or trauma. No redness or rashes. PAST MEDICAL HISTORY     Past Medical History:   Diagnosis Date    Diabetes mellitus ([facility] [address]     Gall stones     Hypertension     Hypothyroidism     Kidney stones        Pt denies any history of stroke, heart disease, COPD, Asthma, GERD, HLD, Cancer, Seizures, Kidney Disease, Hepatitis, TB, Psychiatric Disorders or Substance abuse. SURGICAL HISTORY       Past Surgical History:   Procedure Laterality Date    CHOLECYSTECTOMY         FAMILY HISTORY       Family History   Problem Relation Age of Onset    Cancer Mother     High Blood Pressure Mother     Stroke Mother     Diabetes Mother     Cancer Father        SOCIAL HISTORY       Social History     Social History    Marital status:      Spouse name: N/A    Number of children: N/A    Years of education: N/A     Social History Main Topics    Smoking status: Never Smoker    Smokeless tobacco: Never Used    Alcohol use No    Drug use: No    Sexual activity: Not Asked     Other Topics Concern    None     Social History Narrative    None           REVIEW OF SYSTEMS      Allergies   Allergen Reactions    Penicillins        No current facility-administered medications on file prior to encounter.       Current Outpatient Prescriptions on File Prior to Encounter   Medication Sig Dispense stiffness, trachea central.  No palpable masses or L.N.                 CHEST:  Symmetrical and equal on expansion. HEART:  RRR S1 > S2. No audible murmurs or gallops. LUNGS:  Equal on expansion, normal breath sounds. No adventitious sounds. ABDOMEN:  Obese. Soft on palpation. No localized tenderness. No guarding or rigidity. No palpable organomegaly. LYMPHATICS:  No palpable cervical lymphadenopathy. LOCOMOTOR, BACK AND SPINE:  No tenderness or deformities. EXTREMITIES:  Tenderness on palpation of the Rt Knee joint space worse on the medial aspect, limited ROM. Symmetrical, no pedal edema. Hernans sign negative. No discoloration or ulcerations. NEUROLOGIC:  The patient is conscious, alert, oriented, No apparent focal sensory or motor deficits. PROVISIONAL DIAGNOSES / SURGERY:      Rt Knee Meniscus Tear. Rt Knee Arthroscopy.           Charlotte Santiago PA-C on 12/26/2017 at 10:50 AM

## 2018-01-01 LAB
EKG ATRIAL RATE: 74 BPM
EKG P AXIS: 55 DEGREES
EKG P-R INTERVAL: 132 MS
EKG Q-T INTERVAL: 426 MS
EKG QRS DURATION: 92 MS
EKG QTC CALCULATION (BAZETT): 472 MS
EKG R AXIS: -14 DEGREES
EKG T AXIS: 33 DEGREES
EKG VENTRICULAR RATE: 74 BPM

## 2018-01-03 ENCOUNTER — OFFICE VISIT (OUTPATIENT)
Dept: ORTHOPEDIC SURGERY | Age: 63
End: 2018-01-03

## 2018-01-03 DIAGNOSIS — Z48.02 VISIT FOR SUTURE REMOVAL: Primary | ICD-10-CM

## 2018-01-03 DIAGNOSIS — Z98.890 HX OF ARTHROSCOPY OF RIGHT KNEE: ICD-10-CM

## 2018-01-03 PROCEDURE — 99024 POSTOP FOLLOW-UP VISIT: CPT | Performed by: ORTHOPAEDIC SURGERY

## 2018-01-03 RX ORDER — METHYLPREDNISOLONE 4 MG/1
TABLET ORAL
Qty: 1 KIT | Refills: 0 | Status: ON HOLD | OUTPATIENT
Start: 2018-01-03 | End: 2018-05-21 | Stop reason: ALTCHOICE

## 2018-01-03 RX ORDER — IBUPROFEN 800 MG/1
800 TABLET ORAL EVERY 6 HOURS PRN
COMMUNITY

## 2018-03-20 VITALS — BODY MASS INDEX: 32.01 KG/M2 | WEIGHT: 175 LBS

## 2018-04-19 ENCOUNTER — OFFICE VISIT (OUTPATIENT)
Dept: ORTHOPEDIC SURGERY | Age: 63
End: 2018-04-19
Payer: COMMERCIAL

## 2018-04-19 DIAGNOSIS — M21.371 RIGHT FOOT DROP: Primary | ICD-10-CM

## 2018-04-19 DIAGNOSIS — G57.31 RIGHT PERONEAL NERVE PALSY: ICD-10-CM

## 2018-04-19 PROCEDURE — 99203 OFFICE O/P NEW LOW 30 MIN: CPT | Performed by: ORTHOPAEDIC SURGERY

## 2018-04-19 PROCEDURE — G8419 CALC BMI OUT NRM PARAM NOF/U: HCPCS | Performed by: ORTHOPAEDIC SURGERY

## 2018-04-19 PROCEDURE — G8427 DOCREV CUR MEDS BY ELIG CLIN: HCPCS | Performed by: ORTHOPAEDIC SURGERY

## 2018-04-19 PROCEDURE — 3017F COLORECTAL CA SCREEN DOC REV: CPT | Performed by: ORTHOPAEDIC SURGERY

## 2018-04-19 PROCEDURE — 1036F TOBACCO NON-USER: CPT | Performed by: ORTHOPAEDIC SURGERY

## 2018-05-02 ENCOUNTER — HOSPITAL ENCOUNTER (OUTPATIENT)
Age: 63
Setting detail: SPECIMEN
Discharge: HOME | End: 2018-05-02
Payer: COMMERCIAL

## 2018-05-07 LAB — SURGICAL PATHOLOGY REPORT: NORMAL

## 2018-05-20 ENCOUNTER — ANESTHESIA EVENT (OUTPATIENT)
Dept: OPERATING ROOM | Age: 63
End: 2018-05-20
Payer: COMMERCIAL

## 2018-05-21 ENCOUNTER — HOSPITAL ENCOUNTER (OUTPATIENT)
Age: 63
Setting detail: OUTPATIENT SURGERY
Discharge: HOME | End: 2018-05-21
Attending: ORTHOPAEDIC SURGERY | Admitting: ORTHOPAEDIC SURGERY
Payer: COMMERCIAL

## 2018-05-21 ENCOUNTER — ANESTHESIA (OUTPATIENT)
Dept: OPERATING ROOM | Age: 63
End: 2018-05-21
Payer: COMMERCIAL

## 2018-05-21 VITALS
TEMPERATURE: 97.7 F | BODY MASS INDEX: 29.23 KG/M2 | RESPIRATION RATE: 16 BRPM | SYSTOLIC BLOOD PRESSURE: 146 MMHG | OXYGEN SATURATION: 100 % | HEART RATE: 69 BPM | HEIGHT: 63 IN | WEIGHT: 165 LBS | DIASTOLIC BLOOD PRESSURE: 73 MMHG

## 2018-05-21 VITALS — OXYGEN SATURATION: 100 % | DIASTOLIC BLOOD PRESSURE: 60 MMHG | SYSTOLIC BLOOD PRESSURE: 104 MMHG

## 2018-05-21 DIAGNOSIS — G57.31 RIGHT PERONEAL NERVE PALSY: Primary | ICD-10-CM

## 2018-05-21 LAB
GLUCOSE BLD-MCNC: 102 MG/DL (ref 65–105)
GLUCOSE BLD-MCNC: 114 MG/DL (ref 65–105)

## 2018-05-21 PROCEDURE — 3700000001 HC ADD 15 MINUTES (ANESTHESIA): Performed by: ORTHOPAEDIC SURGERY

## 2018-05-21 PROCEDURE — 7100000001 HC PACU RECOVERY - ADDTL 15 MIN: Performed by: ORTHOPAEDIC SURGERY

## 2018-05-21 PROCEDURE — 7100000031 HC ASPR PHASE II RECOVERY - ADDTL 15 MIN: Performed by: ORTHOPAEDIC SURGERY

## 2018-05-21 PROCEDURE — 2500000003 HC RX 250 WO HCPCS: Performed by: ORTHOPAEDIC SURGERY

## 2018-05-21 PROCEDURE — 2580000003 HC RX 258: Performed by: ANESTHESIOLOGY

## 2018-05-21 PROCEDURE — 6360000002 HC RX W HCPCS: Performed by: ORTHOPAEDIC SURGERY

## 2018-05-21 PROCEDURE — 2500000003 HC RX 250 WO HCPCS: Performed by: NURSE ANESTHETIST, CERTIFIED REGISTERED

## 2018-05-21 PROCEDURE — 7100000000 HC PACU RECOVERY - FIRST 15 MIN: Performed by: ORTHOPAEDIC SURGERY

## 2018-05-21 PROCEDURE — 3600000002 HC SURGERY LEVEL 2 BASE: Performed by: ORTHOPAEDIC SURGERY

## 2018-05-21 PROCEDURE — 6360000002 HC RX W HCPCS: Performed by: NURSE ANESTHETIST, CERTIFIED REGISTERED

## 2018-05-21 PROCEDURE — 3700000000 HC ANESTHESIA ATTENDED CARE: Performed by: ORTHOPAEDIC SURGERY

## 2018-05-21 PROCEDURE — 7100000030 HC ASPR PHASE II RECOVERY - FIRST 15 MIN: Performed by: ORTHOPAEDIC SURGERY

## 2018-05-21 PROCEDURE — 82947 ASSAY GLUCOSE BLOOD QUANT: CPT

## 2018-05-21 PROCEDURE — 64708 REVISE ARM/LEG NERVE: CPT | Performed by: ORTHOPAEDIC SURGERY

## 2018-05-21 PROCEDURE — 3600000012 HC SURGERY LEVEL 2 ADDTL 15MIN: Performed by: ORTHOPAEDIC SURGERY

## 2018-05-21 RX ORDER — DIPHENHYDRAMINE HYDROCHLORIDE 50 MG/ML
12.5 INJECTION INTRAMUSCULAR; INTRAVENOUS
Status: DISCONTINUED | OUTPATIENT
Start: 2018-05-21 | End: 2018-05-21 | Stop reason: HOSPADM

## 2018-05-21 RX ORDER — SODIUM CHLORIDE 0.9 % (FLUSH) 0.9 %
10 SYRINGE (ML) INJECTION PRN
Status: DISCONTINUED | OUTPATIENT
Start: 2018-05-21 | End: 2018-05-21 | Stop reason: HOSPADM

## 2018-05-21 RX ORDER — HYDROCODONE BITARTRATE AND ACETAMINOPHEN 5; 325 MG/1; MG/1
1 TABLET ORAL PRN
Status: DISCONTINUED | OUTPATIENT
Start: 2018-05-21 | End: 2018-05-21 | Stop reason: HOSPADM

## 2018-05-21 RX ORDER — 0.9 % SODIUM CHLORIDE 0.9 %
500 INTRAVENOUS SOLUTION INTRAVENOUS
Status: DISCONTINUED | OUTPATIENT
Start: 2018-05-21 | End: 2018-05-21 | Stop reason: HOSPADM

## 2018-05-21 RX ORDER — DEXAMETHASONE SODIUM PHOSPHATE 4 MG/ML
INJECTION, SOLUTION INTRA-ARTICULAR; INTRALESIONAL; INTRAMUSCULAR; INTRAVENOUS; SOFT TISSUE PRN
Status: DISCONTINUED | OUTPATIENT
Start: 2018-05-21 | End: 2018-05-21 | Stop reason: SDUPTHER

## 2018-05-21 RX ORDER — FENTANYL CITRATE 50 UG/ML
INJECTION, SOLUTION INTRAMUSCULAR; INTRAVENOUS PRN
Status: DISCONTINUED | OUTPATIENT
Start: 2018-05-21 | End: 2018-05-21 | Stop reason: SDUPTHER

## 2018-05-21 RX ORDER — HYDRALAZINE HYDROCHLORIDE 20 MG/ML
5 INJECTION INTRAMUSCULAR; INTRAVENOUS EVERY 10 MIN PRN
Status: DISCONTINUED | OUTPATIENT
Start: 2018-05-21 | End: 2018-05-21 | Stop reason: HOSPADM

## 2018-05-21 RX ORDER — LIDOCAINE HYDROCHLORIDE 20 MG/ML
INJECTION, SOLUTION INFILTRATION; PERINEURAL PRN
Status: DISCONTINUED | OUTPATIENT
Start: 2018-05-21 | End: 2018-05-21 | Stop reason: SDUPTHER

## 2018-05-21 RX ORDER — TRANEXAMIC ACID 100 MG/ML
INJECTION, SOLUTION INTRAVENOUS PRN
Status: DISCONTINUED | OUTPATIENT
Start: 2018-05-21 | End: 2018-05-21 | Stop reason: SDUPTHER

## 2018-05-21 RX ORDER — HYDROCODONE BITARTRATE AND ACETAMINOPHEN 5; 325 MG/1; MG/1
2 TABLET ORAL PRN
Status: DISCONTINUED | OUTPATIENT
Start: 2018-05-21 | End: 2018-05-21 | Stop reason: HOSPADM

## 2018-05-21 RX ORDER — SODIUM CHLORIDE 0.9 % (FLUSH) 0.9 %
10 SYRINGE (ML) INJECTION EVERY 12 HOURS SCHEDULED
Status: DISCONTINUED | OUTPATIENT
Start: 2018-05-21 | End: 2018-05-21 | Stop reason: HOSPADM

## 2018-05-21 RX ORDER — FENTANYL CITRATE 50 UG/ML
25 INJECTION, SOLUTION INTRAMUSCULAR; INTRAVENOUS EVERY 5 MIN PRN
Status: DISCONTINUED | OUTPATIENT
Start: 2018-05-21 | End: 2018-05-21 | Stop reason: HOSPADM

## 2018-05-21 RX ORDER — ONDANSETRON 2 MG/ML
INJECTION INTRAMUSCULAR; INTRAVENOUS PRN
Status: DISCONTINUED | OUTPATIENT
Start: 2018-05-21 | End: 2018-05-21 | Stop reason: SDUPTHER

## 2018-05-21 RX ORDER — HYDROCODONE BITARTRATE AND ACETAMINOPHEN 5; 325 MG/1; MG/1
1-2 TABLET ORAL EVERY 4 HOURS PRN
Qty: 20 TABLET | Refills: 0 | Status: SHIPPED | OUTPATIENT
Start: 2018-05-21 | End: 2018-05-28

## 2018-05-21 RX ORDER — PROMETHAZINE HYDROCHLORIDE 25 MG/ML
6.25 INJECTION, SOLUTION INTRAMUSCULAR; INTRAVENOUS
Status: DISCONTINUED | OUTPATIENT
Start: 2018-05-21 | End: 2018-05-21 | Stop reason: HOSPADM

## 2018-05-21 RX ORDER — MORPHINE SULFATE 2 MG/ML
2 INJECTION, SOLUTION INTRAMUSCULAR; INTRAVENOUS EVERY 5 MIN PRN
Status: DISCONTINUED | OUTPATIENT
Start: 2018-05-21 | End: 2018-05-21 | Stop reason: HOSPADM

## 2018-05-21 RX ORDER — SODIUM CHLORIDE, SODIUM LACTATE, POTASSIUM CHLORIDE, CALCIUM CHLORIDE 600; 310; 30; 20 MG/100ML; MG/100ML; MG/100ML; MG/100ML
INJECTION, SOLUTION INTRAVENOUS CONTINUOUS
Status: DISCONTINUED | OUTPATIENT
Start: 2018-05-21 | End: 2018-05-21 | Stop reason: HOSPADM

## 2018-05-21 RX ORDER — BUPIVACAINE HYDROCHLORIDE AND EPINEPHRINE 5; 5 MG/ML; UG/ML
INJECTION, SOLUTION EPIDURAL; INTRACAUDAL; PERINEURAL PRN
Status: DISCONTINUED | OUTPATIENT
Start: 2018-05-21 | End: 2018-05-21 | Stop reason: HOSPADM

## 2018-05-21 RX ORDER — MIDAZOLAM HYDROCHLORIDE 1 MG/ML
INJECTION INTRAMUSCULAR; INTRAVENOUS PRN
Status: DISCONTINUED | OUTPATIENT
Start: 2018-05-21 | End: 2018-05-21 | Stop reason: SDUPTHER

## 2018-05-21 RX ORDER — CEFAZOLIN SODIUM 1 G/3ML
INJECTION, POWDER, FOR SOLUTION INTRAMUSCULAR; INTRAVENOUS
Status: DISCONTINUED
Start: 2018-05-21 | End: 2018-05-21 | Stop reason: HOSPADM

## 2018-05-21 RX ORDER — KETOROLAC TROMETHAMINE 30 MG/ML
INJECTION, SOLUTION INTRAMUSCULAR; INTRAVENOUS PRN
Status: DISCONTINUED | OUTPATIENT
Start: 2018-05-21 | End: 2018-05-21 | Stop reason: SDUPTHER

## 2018-05-21 RX ORDER — MEPERIDINE HYDROCHLORIDE 50 MG/ML
12.5 INJECTION INTRAMUSCULAR; INTRAVENOUS; SUBCUTANEOUS EVERY 5 MIN PRN
Status: DISCONTINUED | OUTPATIENT
Start: 2018-05-21 | End: 2018-05-21 | Stop reason: HOSPADM

## 2018-05-21 RX ORDER — PROPOFOL 10 MG/ML
INJECTION, EMULSION INTRAVENOUS PRN
Status: DISCONTINUED | OUTPATIENT
Start: 2018-05-21 | End: 2018-05-21 | Stop reason: SDUPTHER

## 2018-05-21 RX ADMIN — PROPOFOL 50 MG: 10 INJECTION, EMULSION INTRAVENOUS at 07:31

## 2018-05-21 RX ADMIN — DEXAMETHASONE SODIUM PHOSPHATE 4 MG: 4 INJECTION, SOLUTION INTRAMUSCULAR; INTRAVENOUS at 07:49

## 2018-05-21 RX ADMIN — PROPOFOL 50 MG: 10 INJECTION, EMULSION INTRAVENOUS at 07:32

## 2018-05-21 RX ADMIN — Medication 2 G: at 07:26

## 2018-05-21 RX ADMIN — MIDAZOLAM 2 MG: 1 INJECTION INTRAMUSCULAR; INTRAVENOUS at 07:26

## 2018-05-21 RX ADMIN — LIDOCAINE HYDROCHLORIDE 60 MG: 20 INJECTION, SOLUTION INFILTRATION; PERINEURAL at 07:30

## 2018-05-21 RX ADMIN — ONDANSETRON 4 MG: 2 INJECTION INTRAMUSCULAR; INTRAVENOUS at 07:49

## 2018-05-21 RX ADMIN — FENTANYL CITRATE 100 MCG: 50 INJECTION, SOLUTION INTRAMUSCULAR; INTRAVENOUS at 07:30

## 2018-05-21 RX ADMIN — PROPOFOL 150 MG: 10 INJECTION, EMULSION INTRAVENOUS at 07:30

## 2018-05-21 RX ADMIN — KETOROLAC TROMETHAMINE 30 MG: 30 INJECTION, SOLUTION INTRAMUSCULAR at 07:51

## 2018-05-21 RX ADMIN — SODIUM CHLORIDE, POTASSIUM CHLORIDE, SODIUM LACTATE AND CALCIUM CHLORIDE: 600; 310; 30; 20 INJECTION, SOLUTION INTRAVENOUS at 06:56

## 2018-05-21 RX ADMIN — TRANEXAMIC ACID 1000 MG: 100 INJECTION, SOLUTION INTRAVENOUS at 07:37

## 2018-05-21 ASSESSMENT — PULMONARY FUNCTION TESTS
PIF_VALUE: 16
PIF_VALUE: 21
PIF_VALUE: 3
PIF_VALUE: 4
PIF_VALUE: 16
PIF_VALUE: 16
PIF_VALUE: 19
PIF_VALUE: 3
PIF_VALUE: 16
PIF_VALUE: 2
PIF_VALUE: 16
PIF_VALUE: 19
PIF_VALUE: 3
PIF_VALUE: 16
PIF_VALUE: 3
PIF_VALUE: 16
PIF_VALUE: 19
PIF_VALUE: 16
PIF_VALUE: 21
PIF_VALUE: 16
PIF_VALUE: 20
PIF_VALUE: 16
PIF_VALUE: 3
PIF_VALUE: 3
PIF_VALUE: 16
PIF_VALUE: 35
PIF_VALUE: 21
PIF_VALUE: 16
PIF_VALUE: 19
PIF_VALUE: 16
PIF_VALUE: 4
PIF_VALUE: 16
PIF_VALUE: 14
PIF_VALUE: 15
PIF_VALUE: 0
PIF_VALUE: 20
PIF_VALUE: 3
PIF_VALUE: 16
PIF_VALUE: 3
PIF_VALUE: 16
PIF_VALUE: 10
PIF_VALUE: 16
PIF_VALUE: 20
PIF_VALUE: 19
PIF_VALUE: 2
PIF_VALUE: 0
PIF_VALUE: 16
PIF_VALUE: 18
PIF_VALUE: 16
PIF_VALUE: 20

## 2018-05-21 ASSESSMENT — PAIN DESCRIPTION - PROGRESSION
CLINICAL_PROGRESSION: NOT CHANGED
CLINICAL_PROGRESSION: NOT CHANGED

## 2018-05-21 ASSESSMENT — PAIN DESCRIPTION - ORIENTATION
ORIENTATION: LOWER;RIGHT
ORIENTATION: RIGHT;LOWER

## 2018-05-21 ASSESSMENT — PAIN SCALES - GENERAL
PAINLEVEL_OUTOF10: 3
PAINLEVEL_OUTOF10: 0
PAINLEVEL_OUTOF10: 3

## 2018-05-21 ASSESSMENT — PAIN DESCRIPTION - DESCRIPTORS
DESCRIPTORS: ACHING;DULL
DESCRIPTORS: DULL;ACHING
DESCRIPTORS: ACHING

## 2018-05-21 ASSESSMENT — PAIN DESCRIPTION - LOCATION
LOCATION: LEG
LOCATION: LEG

## 2018-05-21 ASSESSMENT — PAIN - FUNCTIONAL ASSESSMENT: PAIN_FUNCTIONAL_ASSESSMENT: 0-10

## 2018-05-21 ASSESSMENT — PAIN DESCRIPTION - PAIN TYPE
TYPE: SURGICAL PAIN
TYPE: SURGICAL PAIN

## 2018-05-21 ASSESSMENT — PAIN DESCRIPTION - FREQUENCY
FREQUENCY: CONTINUOUS
FREQUENCY: CONTINUOUS

## 2018-06-01 ENCOUNTER — TELEPHONE (OUTPATIENT)
Dept: ORTHOPEDIC SURGERY | Age: 63
End: 2018-06-01

## 2018-06-05 ENCOUNTER — OFFICE VISIT (OUTPATIENT)
Dept: ORTHOPEDIC SURGERY | Age: 63
End: 2018-06-05

## 2018-06-05 DIAGNOSIS — M21.371 RIGHT FOOT DROP: Primary | ICD-10-CM

## 2018-06-05 DIAGNOSIS — G57.31 RIGHT PERONEAL NERVE PALSY: ICD-10-CM

## 2018-06-05 PROCEDURE — 99024 POSTOP FOLLOW-UP VISIT: CPT | Performed by: ORTHOPAEDIC SURGERY

## 2018-06-26 ENCOUNTER — HOSPITAL ENCOUNTER (OUTPATIENT)
Facility: CLINIC | Age: 63
Discharge: HOME | End: 2018-06-28
Payer: COMMERCIAL

## 2018-06-26 ENCOUNTER — HOSPITAL ENCOUNTER (OUTPATIENT)
Dept: GENERAL RADIOLOGY | Facility: CLINIC | Age: 63
Discharge: HOME | End: 2018-06-28
Payer: COMMERCIAL

## 2018-06-26 DIAGNOSIS — N20.0 CALCULUS OF KIDNEY: ICD-10-CM

## 2018-06-26 PROCEDURE — 74018 RADEX ABDOMEN 1 VIEW: CPT

## 2018-07-12 ENCOUNTER — OFFICE VISIT (OUTPATIENT)
Dept: NEUROLOGY | Age: 63
End: 2018-07-12
Payer: COMMERCIAL

## 2018-07-12 VITALS
HEIGHT: 63 IN | HEART RATE: 80 BPM | BODY MASS INDEX: 29.34 KG/M2 | WEIGHT: 165.6 LBS | DIASTOLIC BLOOD PRESSURE: 79 MMHG | SYSTOLIC BLOOD PRESSURE: 139 MMHG

## 2018-07-12 DIAGNOSIS — G57.31 RIGHT PERONEAL NERVE PALSY: Primary | ICD-10-CM

## 2018-07-12 DIAGNOSIS — M21.371 RIGHT FOOT DROP: ICD-10-CM

## 2018-07-12 PROCEDURE — 3017F COLORECTAL CA SCREEN DOC REV: CPT | Performed by: PSYCHIATRY & NEUROLOGY

## 2018-07-12 PROCEDURE — G8427 DOCREV CUR MEDS BY ELIG CLIN: HCPCS | Performed by: PSYCHIATRY & NEUROLOGY

## 2018-07-12 PROCEDURE — G8419 CALC BMI OUT NRM PARAM NOF/U: HCPCS | Performed by: PSYCHIATRY & NEUROLOGY

## 2018-07-12 PROCEDURE — 99244 OFF/OP CNSLTJ NEW/EST MOD 40: CPT | Performed by: PSYCHIATRY & NEUROLOGY

## 2018-07-12 NOTE — PROGRESS NOTES
[facility]  [address]  [place], [address] [facility]  Dept: [phone]  Dept Fax: [phone]       MD Elver Little MD Ahmed B. Janeen Outlaw, MD Valdemar Muckle, MD Toi Heller, MD Thor Hunting, CNP    New Patient Consultation    7/12/2018    History of Present Illness:  I had the pleasure of seeing your patient, Blanco Squries who presents with foot drop. Patient reports that in September 2017, she was treated for bursitis and underwent physical therapy. 3 months later, in December 2017, she started having a burning and tingling pain on the right lateral leg. She injured her knee in July of the same year, and had surgery done for torn medial meniscus on December 26, 2017. 3 weeks after her knee surgery, she noticed sudden onset of a foot drop on the right. This was causing significant balance problems and multiple episodes of falling and tripping. She was referred by her primary care physician to physical therapy in March and was fitted with an AFO brace. In April 2018, she then had an EMG performed by Dr. Cruz Otero which showed no responses on the right peroneal nerve, consistent with a right peroneal nerve palsy. She then saw Dr. Chapo Mayo who did a peroneal nerve release on May 22. Currently, patient denies any significant improvement of her foot drop. She reports being unable to move any of her toes, she does report improvement of numbness on the lateral 3 toes. The burning and tingling in her right lateral leg has also resolved. Dr. Chapo Mayo reportedly discussed doing a tendon repair if she had no significant improvement, but patient is unsure regarding this option. She feels that her gait is currently manageable with using the brace, denies any symptoms in the contralateral leg.  She denies any similar symptoms in the past.    Prior workup reviewed:  MRI lumbar spine without contrast: No significant disc protrusion or cord pathology  EMG bilateral Skin: Skin color, texture normal, no rashes, no lesions               Neurological Examination    Mental status    Alert and oriented x 3; intact memory with no confusion, speech or language problems; no hallucinations or delusions  Fund of information appropriate for level of education    Cranial nerves    II - visual fields intact to confrontation , fundoscopy showed no  papiledema                                                III, IV, VI  extra-ocular muscles full: no pupillary defect; no PATIENCE, no nystagmus, no ptosis   V - normal facial sensation                                                               VII - normal facial symmetry                                                             VIII - intact hearing                                                                             IX, X - symmetrical palate                                                                  XI - symmetrical shoulder shrug                                                       XII - tongue midline without atrophy or fasciculation      Motor function  Normal muscle bulk and tone; strength 5/5 on all 4 extremities except 0/5 on foot dorsiflexion and eversion on the R, no pronator drift      Sensory function Intact to light touch, pinprick, vibration, proprioception on all 4 extremities      Cerebellar Intact fine motor movement. No involuntary movements or tremors. No ataxia or dysmetria on finger to nose or heel to shin testing      Reflex function DTR 2+ on bilateral UE ,1 in b/l patella and 0 in Achilles b/l. Negative Babinski      Gait                   + steppage gait on R            Assessment:  Right peroneal nerve palsy    Plan: This is a 60 y.o. female who presents with a seven-month history of right-sided foot drop, EMG showed peroneal nerve palsy with no responses on stimulation. On review of the raw data over her EMG, no needle examination was done.  Patient underwent peroneal nerve release on the right on May 22 with no significant improvement of her symptoms. Advised patient that unfortunately her prognosis is not good given the lack of improvement in the past 7 months. Will repeat an EMG for further prognostication, as patient is motivated in getting better and is considering doing a tendon repair. Follow-up after EMG            Signed: Tona Ross MD    Please note that this chart was generated using voice recognition Dragon dictation software. Although every effort was made to ensure the accuracy of this automated transcription, some errors in transcription may have occurred.

## 2018-07-17 ENCOUNTER — OFFICE VISIT (OUTPATIENT)
Dept: ORTHOPEDIC SURGERY | Age: 63
End: 2018-07-17

## 2018-07-17 DIAGNOSIS — G57.31 RIGHT PERONEAL NERVE PALSY: ICD-10-CM

## 2018-07-17 DIAGNOSIS — M21.371 RIGHT FOOT DROP: Primary | ICD-10-CM

## 2018-07-17 PROCEDURE — 99024 POSTOP FOLLOW-UP VISIT: CPT | Performed by: ORTHOPAEDIC SURGERY

## 2018-07-17 NOTE — PROGRESS NOTES
Subjective:      Patient ID: Lilo Colunga is a 60 y.o. female. HPI  Follow-up right foot drop right peroneal nerve palsy  Review of Systems    Objective:   Physical Exam    Patient was significant improvement in sensation postoperatively    Unfortunately patient with no limp significant improvement in motor function    Continued near complete failure of EHL and anterior tibialis      Assessment:      Encounter Diagnoses   Name Primary?     Right foot drop Yes    Right peroneal nerve palsy            Plan:      Follow-up 6 months

## 2018-08-22 ENCOUNTER — OFFICE VISIT (OUTPATIENT)
Dept: NEUROLOGY | Age: 63
End: 2018-08-22
Payer: COMMERCIAL

## 2018-08-22 DIAGNOSIS — M21.371 RIGHT FOOT DROP: Primary | ICD-10-CM

## 2018-08-22 DIAGNOSIS — G57.31 RIGHT PERONEAL NERVE PALSY: ICD-10-CM

## 2018-08-22 PROCEDURE — 95886 MUSC TEST DONE W/N TEST COMP: CPT | Performed by: PSYCHIATRY & NEUROLOGY

## 2018-08-22 PROCEDURE — 95910 NRV CNDJ TEST 7-8 STUDIES: CPT | Performed by: PSYCHIATRY & NEUROLOGY

## 2018-08-22 NOTE — PROGRESS NOTES
[facility] [address] [place] [address]         Patient: Kelly Oliver Address: [address] [place] [address] Sex: Female City: [place]      Age: 60 State: [place]  Height: 62 inches ZIP: [id number]  Weight: 165 lbs Phone: [phone]  I. D.#: [id number] Physician: ZACKARY Fields   Ref. M.D.: Jamel Anne M.D. Test Date: 08/22/18         History/Comments: Right Lower Extremity Study: This is a 50-year-old female with c/o back pain and weakness involving the right lower extremity for several month duration. She denied radiating pain and paresthesias from the lower back. But she also stated that she has had \"deep burning pain in right lateral leg\". Denies bladder and bowel incontinence. Motor Nerve Study    Right Peroneal Nerve  Rec Site:   EDB Lat (ms) Norm Lat Amp (mV) Norm Amp Dist (mm) C.V. (m/s) Norm C.V.  STIM SITE        Ankle 39.9  <6  NR  >2.5  90       NOTES:NR       Right Tibial Nerve  Rec Site:   AH Lat (ms) Norm Lat Amp (mV) Norm Amp Dist (mm) C.V. (m/s) Norm C.V.  STIM SITE        Ankle 5.2  <6  7.6  >3  90       Pop. Fos. 12.0    5.8    284  41.6  >40       Right Peron-T. A. Nerve  Rec Site:   Tib. Ant. Lat (ms) Norm Lat Amp (mV) Norm Amp Dist (mm) C.V. (m/s) Norm C.V.  STIM SITE        Fib. Head 8.7  <4.5  2.6  >3  80       Pop.  Fos. 10.3    2.1    100  60.0  >40         Sensory Nerve Study    Right Sural Nerve  Rec Site: Ankle Norm Lat Pk Lat (ms)Amp (uV) Norm Amp Dist (mm) C.V. (m/s) Norm C.V.  STIM SITE        mid calf <4.6  4.2  11.3  >3  140  41.6  >40   mid calf   4.3  10.6    140  41.4         Right S. Peroneal  Nerve  Rec Site: lateral Ankl Norm Lat Pk Lat (ms)Amp (uV) Norm Amp Dist (mm) C.V. (m/s) Norm C.V.  STIM SITE        lateral calf <4.6  3.9  6.6  >3  120  40.7  >40   lateral calf   3.8  7.0    120  42.9           F-Wave Study    Right Peroneal Nerve  Rec Site: EDB Latency  Stim Site: Ankle ms  M wave 2.50   F wave 45.50   F-M 43.00   NOTES:  No Response request.  10. Needle recording using monopolar needle was performed in a sampling of L2-L3 to L5-S1 innervated limb and paraspinal muscles in right lower extremity. This needle study demonstrated fibrillation potentials and positive sharp waves and decreased recruitment of MUAPs in right EDB, tibialis anterior and peroneus longus muscles. Rest of the muscles including Right medial head of gastrocnemius, rectus femoris, vastus lateralis muscles demonstrated normal-appearing motor units without any abnormal spontaneous activity. 11. Needle study of left lower extremity is not performed at patient's request.            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 Since lesion appears to be partial; prognosis/recovery can be very slow. Clinical correlation is recommended. · This study did not demonstrate any electrodiagnostic evidence of lumbosacral radiculopathy or plexopathy in the nerves and muscles tested. Clinical correlation is recommended. Please feel free to contact me should you have any questions or concerns regarding results of any of the above studies. Again, thank you for referring this patient and for allowing me to participate in the care of your patient. With sincere appreciation,      _______________________  Armando Gavin M.D.   Board Certified Neurologist  Dictated, but not edited    Vilma Nine :-  *EMG: electromyography *NCS: nerve conduction study*FDI : first dorsal Interosseous *ADM : abductor digiti minimi *APB : abductor pollicis brevis *FCU : flexor corpi ulnaris  *EIP : extensor indicis pollicis  *EDC : extensor digitorum communis *ECR : extensor carpi radialis *Rect Fem: rectus femoris * Vast Lat: vastus lateralis * Gastroc

## 2018-08-22 NOTE — PROGRESS NOTES
Subjective:      Patient ID: Suni Guardado is a 60 y.o. female. LOWER EXTREMITY QUESTIONNAIRE    Reason for this testing:    Is there any history of head or neck injury? {YES/NO:}   Is there any history of back injury? {YES/NO:}   Is there any history of motor vehicle accident? {YES/NO:}   Is there any pending litigation (or possible lawsuit) involved? If yes, please explain. {YES/NO:}   Are you currently taking any blood thinners?  (for ex: ASA, Plavix, Coumadin. {YES/NO:}   Do you have a pacemaker? {YES/NO:}   Do you have an implanted defibrillator? {YES/NO:}     Do you have back pain? {YES/NO:}   If so, what kind of pain? {type:755781}  Where does the pain start? Where does the pain stop? {RADIATES:6226051400}   Does the pain radiate down to the thigh(Rt / [place] / both)? Which part of the thigh (front / anterior) or (back / posterior)? {YES/NO:} {RIGHT/LEFT/BOTH:18030}  {Desc; upper/miidle/lower/anterior/posterior:07192}  Does the pain radiate down to the leg ([address] / Nadja Gens)? Which part of the leg (medial calf / lateral calf)? {YES/NO:} {lower le}  Does the pain radiate down to the foot? {YES/NO:} {DESC; LEFT FOOT/RIGHT RHFU:01463}  {Anatomy; location foot:82175}  Does the back pain get worse with sitting? {YES/NO:}   Does the back pain get worse with standing or walking? {YES/NO:}   Does the back pain get worse with lying flat? {YES/NO:}   Do you have weakness in your legs and foot? {YES/NO:}   Do you have numbness and tingling shooting down from the lower back or hip? {YES/NO:}   If so where does it shoot down to (thigh, leg, foot, toes)? Tell me more about it. What makes you feel better as far as pain concerned? What makes the pain get worse? For how long have you had these problems? {NUMBERS 1-12:29144} {DAYS/WEEKS/MONTHS (D):48037}   Do you have bladder or bowel incontinence?  {YES/NO:}   Do

## 2018-08-22 NOTE — PROGRESS NOTES
LOWER EXTREMITY QUESTIONNAIRE    Reason for this testing:     Is there any history of head or neck injury? No    Is there any history of back injury? No    Is there any history of motor vehicle accident? No    Is there any pending litigation (or possible lawsuit) involved? If yes, please explain. No    Are you currently taking any blood thinners?  (for ex: ASA, Plavix, Coumadin. No    Do you have a pacemaker? No    Do you have an implanted defibrillator? No      Do you have back pain? Yes    If so, what kind of pain?     aching   Where does the pain start? Where does the pain stop?    does not radiate    Does the pain radiate down to the thigh(Rt / [place]uncion Felix / both)? Which part of the thigh (front / anterior) or (back / posterior)? No right  posterior   Does the pain radiate down to the leg (Genet Stain / Kennedy Mola)? Which part of the leg (medial calf / lateral calf)? No    Does the pain radiate down to the foot? No    Does the back pain get worse with sitting? No    Does the back pain get worse with standing or walking? No    Does the back pain get worse with lying flat? Yes only while lying on rt side   Do you have weakness in your legs and foot? Yes    Do you have numbness and tingling shooting down from the lower back or hip? Yes    If so where does it shoot down to (thigh, leg, foot, toes)? Tell me more about it. What makes you feel better as far as pain concerned? What makes the pain get worse? For how long have you had these problems? several month(s)    Do you have bladder or bowel incontinence? No    Do you have diabetes? No    Do you have MRI of the lumbar spine? Yes    Do you have CT scan of the lumbar spine? No    Did you ever have any work related injury? Is so, when and what kind? No    Do you lift weights? No    Did you have EMG/nerve conduction study before? If so, when and where? Yes Dr Augie Stallings   Do you have cold feet? No    Do you get cramps in calf muscles?  No    Do you feel your shoes are getting tight? Yes    Do you use any brace?  Yes

## 2018-09-20 ENCOUNTER — OFFICE VISIT (OUTPATIENT)
Dept: NEUROLOGY | Age: 63
End: 2018-09-20
Payer: COMMERCIAL

## 2018-09-20 VITALS
BODY MASS INDEX: 29.87 KG/M2 | WEIGHT: 162.3 LBS | HEART RATE: 78 BPM | HEIGHT: 62 IN | SYSTOLIC BLOOD PRESSURE: 148 MMHG | DIASTOLIC BLOOD PRESSURE: 89 MMHG

## 2018-09-20 DIAGNOSIS — G57.01 COMMON PERONEAL NEUROPATHY OF RIGHT LOWER EXTREMITY: Primary | ICD-10-CM

## 2018-09-20 DIAGNOSIS — M21.371 RIGHT FOOT DROP: ICD-10-CM

## 2018-09-20 PROCEDURE — 1036F TOBACCO NON-USER: CPT | Performed by: PSYCHIATRY & NEUROLOGY

## 2018-09-20 PROCEDURE — 99214 OFFICE O/P EST MOD 30 MIN: CPT | Performed by: PSYCHIATRY & NEUROLOGY

## 2018-09-20 PROCEDURE — G8427 DOCREV CUR MEDS BY ELIG CLIN: HCPCS | Performed by: PSYCHIATRY & NEUROLOGY

## 2018-09-20 PROCEDURE — G8419 CALC BMI OUT NRM PARAM NOF/U: HCPCS | Performed by: PSYCHIATRY & NEUROLOGY

## 2018-09-20 PROCEDURE — 3017F COLORECTAL CA SCREEN DOC REV: CPT | Performed by: PSYCHIATRY & NEUROLOGY

## 2018-09-20 NOTE — PROGRESS NOTES
[facility]  Offices: [address], [place], [address]  [address] [facility], [address], [place], [address] [facility]  [address] [facility] [address], [address]  Phone: [phone]  Fax: [phone]    E. 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 On her last visit, I proceeded with a repeat EMG to help with prognostication. This showed absent motor responses in the EDB, but motor responses were seen in the tibialis anterior and normal sensory responses were seen in the right superficial peroneal nerve, which is improved from her initial EMG in April. I discussed the EMG results in detail with the patient and her . Clinically, she reports that she has been able to move her big toe occasionally, but not consistently. She also reports more burning and tingling pain on the top of her right foot as well as on the lateral sides, she denies any symptoms on the left foot. Pain severity is 5/10. She continues to wear an ankle brace, denies any recent falls. She is now interested in starting physical therapy. She has a follow-up with Dr. Billy Means in the next few weeks, patient reports she takes she would like to hold off on tendon repair for now. She denies any new complaints, no new medications. 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 Since lesion appears to be partial; prognosis/recovery can be very slow.   Clinical correlation is recommended.   This study did not demonstrate any levothyroxine (SYNTHROID) 50 MCG tablet Take 50 mcg by mouth Daily.  fenofibrate (TRICOR) 145 MG tablet Take 145 mg by mouth daily. No current facility-administered medications for this visit.                                          PHYSICAL EXAMINATION       Vitals:    09/20/18 0842   BP: (!) 141/87   Pulse: 78                                              .                                                                                                    General Appearance:  Alert, cooperative, no signs of distress, appears stated age   Head:  Normocephalic, no signs of trauma   Eyes:  Conjunctiva/corneas clear;  eyelids intact   Ears:  Normal external ear and canals   Nose: Nares normal, mucosa normal, no drainage    Throat: Lips and tongue normal; teeth normal;  gums normal   Neck: Supple, intact flexion, extension and rotation;   trachea midline;  no adenopathy;   thyroid: not enlarged;   no carotid pulse abnormality   Back:   Symmetric, no curvature, ROM adequate   Lungs:   Respirations unlabored   Heart:  Regular rate and rhythm           Extremities: Extremities normal, no cyanosis, no edema   Pulses: Symmetric over head and neck   Skin: Skin color, texture normal, no rashes, no lesions                                             NEUROLOGIC EXAMINATION      Mental status    Alert and oriented x 3; intact memory with no confusion, speech or language problems; no hallucinations or delusions  Fund of information appropriate for level of education    Cranial nerves    II - visual fields intact to confrontation , fundoscopy showed no  papiledema                                                III, IV, VI  extra-ocular muscles full: no pupillary defect; no PATIENCE, no nystagmus, no ptosis   V - normal facial sensation                                                               VII - normal facial symmetry                                                             VIII - intact hearing IX, X - symmetrical palate                                                                  XI - symmetrical shoulder shrug                                                       XII - tongue midline without atrophy or fasciculation      Motor function  Normal muscle bulk and tone; strength 5/5 on all 4 extremities except 2/5 on foot dorsiflexion and eversion the R, no pronator drift      Sensory function Intact to light touch, pinprick, vibration, proprioception on all 4 extremities      Cerebellar Intact fine motor movement. No involuntary movements or tremors. No ataxia or dysmetria on finger to nose or heel to shin testing      Reflex function DTR 2+ on bilateral UE and LE, symmetric. Negative Babinski      Gait                  Ankle brace in place, steppage gait on R              ASSESSMENT/PLAN:       This is a 60 y.o. female who comes in for follow up for common peroneal neuropathy on the right. Repeat EMG showed improved sensory and motor responses in the tibialis anterior, no motor responses elicited from EDB. Advised patient that this is encouraging, however her recovery will most likely be slow since her nerve damage was axonal and not demyelinating. She would like to start physical therapy, will set this up for her. Also advised her to speak with Dr. Mara Porter regarding tendon repair, which was reportedly discussed in her prior visits with him. Follow-up as needed            Deidre Lowry MD  Neurology and Sleep Medicine  [facility].    Please note that this chart was generated using voice recognition Dragon dictation software. Although every effort was made to ensure the accuracy of this automated transcription, some errors in transcription may have occurred.

## 2018-09-24 ENCOUNTER — HOSPITAL ENCOUNTER (OUTPATIENT)
Dept: PHYSICAL THERAPY | Facility: CLINIC | Age: 63
Setting detail: THERAPIES SERIES
Discharge: HOME | End: 2018-09-24
Payer: COMMERCIAL

## 2018-09-24 PROCEDURE — 97161 PT EVAL LOW COMPLEX 20 MIN: CPT

## 2018-09-24 PROCEDURE — 97032 APPL MODALITY 1+ESTIM EA 15: CPT

## 2018-09-24 NOTE — CONSULTS
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 Since lesion appears to be partial; prognosis/recovery can be very slow. Clinical correlation is recommended. 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  Pain:  [x] Yes  Location: lower leg  (stiffness- along nerve distribution);  Low back/R hip Pain Rating: (0-10 scale) 4/10   Type of Pain-\"numb, sharp\"   Pain altered Tx:  [] Yes  [x] No  Action    Symptoms:  [x] Improving (some improvement with sensation) [x] Same    Objective:  *no special tests indicated at this time        Strength  ROM  ° A/P    Left Right Left Right   Hip Flex 4+ 4+ -- --   Ext 4 4 -- --   ABD 4 4 -- --   Knee Flex 5 5 -- --   Ext 5 5 -- --   Ankle DF 5 2 WNL WNL   PF 5 3+ WNL WNL   INV 5 4+ WNL WNL   EVER 5 3 WNL  WNL    EHL 5 2 WNL WNL       Testing:   Common Fib (L4-S2)- 2 branches     Superficial Fib (L4-S1)- R Foot    - sensation: Lower leg: Anterolateral Foot: Dorsum, except between 1st 2 toes Medial & Intermediate dorsal cutaneous nerves of foot     - Present, Hypersensitive    - Foot eversion (see above)     - MM: Fibularis longus/brevis   Deep Fib (L4-5)- R foot    - sensation: Skin between 1st & 2nd toes     - Present, hyposensitive    -  R Toe extension (2/5)   - R ankle DF (2+/5)    - MM: Tibialis anterior,  Extensor hallucis,  Extensor digitorum longus, Extensor digitorum brevis           OBSERVATION No Deficit Deficit Not Tested Comments          Palpation [] [x] [] Tenderness along anterior lateral calf    Some swelling    Sensation [] [x] []    Neurological [] [x] []    Scar:  x   Good mobility    Gait [] [x] [] Analysis: steppage gait pattern, foot drop (R), lacks toe off        Ankle Effusion:   50 cm (R- figure 8)- below malleoli   49 cm (L-- figure 8)-     FUNCTION Normal Difficult Unable   Sitting [x] [] []   Standing [] [x] []   Ambulation [] [x] []   Groom/Dress [] [x] []   Lift/Carry [] [x] []   Stairs [] [x] []   Squat [] [x] []     BALANCE/PROPRIOCEPTION              [x] Not tested d/t significant instability on R    Single leg stance       R                     L                                PAIN   Eyes open                             Sec. Sec                  . [x]    Eyes closed                          Sec. Sec                  . []        Comments:  LEFS- 35 % perceived deficit   Assessment:  Problems:  Pt presents with common fibular neuropathy. Pt is unsure of ALIYAH and states onset began in middle to late December with burning along anterior-lateral calf. Pt states is progressively worsened until in January she was walking long distances in [facility] and had significant foot drop/\"slap. \" Pt reports that she had fibular decompression surgery over fibular head and reports she did not receive an AFO for 6 weeks post surgery. Pt states sensation has improved significantly but strength remains \"very weak. \" Pt has a history of falls due to weakness of ankle DF/great toe extension. Pt's prognosis is fair as length of recovery is difficult to determine based off axonal injury.  Pt is Therapeutic Exercise    [] Modalities:  [x] Therapeutic Activity    [] Ultrasound  [x] Electrical Stimulation  [] Gait Training     [] Massage       [] Lumbar/Cervical Traction  [x] Neuromuscular Re-education [] Cold/hotpack [] Iontophoresis: 4 mg/mL  [x] Instruction in HEP             Dexamethasone Sodium  [x] Manual Therapy             Phosphate 40-80 mAmin  [] Aquatic Therapy  [] Vasocompression/    [] Other:       Game Ready    []  Medication allergies reviewed for use of    Dexamethasone Sodium Phosphate 4mg/ml     with iontophoresis treatments. Pt is not allergic.     Frequency:  1-2 x/week for 12+ visits        Todays Treatment:  Modalities:   Precautions:  Exercises:  Exercise Reps/ Time Weight/ Level Comments         AntiG toe extension 20 x 3     AntiG ankle DF  20 x 3            PROM- self stretch (toe flexors/extensors)       Toe curls (foot intrinsic)      Kt taping  4 min   ankle effusion along lateral/dorsum R foot    VMS Estim-manual  8 min   Anterior tib   10 s on/20 s off with manual assist into max DF   ~32 MA    Other:    Specific Instructions for next treatment: VMS, ANTIGRAVITY STRENGTHENING (COMMON FIB MMs), ankle stability, calf flexibility/MFR     Evaluation Complexity:  History (Personal factors, comorbidities) [x] 0 [] 1-2 [] 3+   Exam (limitations, restrictions) [] 1-2 [x] 3 [] 4+   Clinical presentation (progression) [x] Stable [] Evolving  [] Unstable   Decision Making [x] Low [] Moderate [] High     [x] Low Complexity [] Moderate Complexity [] High Complexity       Treatment Charges: Mins Units   [x] Evaluation       [x]  Low       []  Moderate       []  High 40 1   []  Modalities     [x]  Ther Exercise 6 1   [x]  Manual Therapy 8 1   []  Ther Activities     []  Aquatics     []  Vasocompression     []  Other       TOTAL TREATMENT TIME: 54    Time in:8:03 a    Time Out: 9:14 a     Electronically signed by: Mayito Ansari PT        Physician

## 2018-09-24 NOTE — FLOWSHEET NOTE
[facility] Fall Risk Assessment    Patient Name:  Sheridan Shepherd  : 1955        Risk Factor Scale  Score   History of Falls [x] Yes  [] No 25  0 25   Secondary Diagnosis [x] Yes  [] No 15  0 15   Ambulatory Aid [] Furniture  [] Crutches/cane/walker  [x] None/bedrest/wheelchair/nurse 30  15  0 0   IV/Heparin Lock [] Yes  [x] No 20  0 0   Gait/Transferring [x] Impaired  [] Weak  [] Normal/bedrest/immobile 20  10  0 20   Mental Status [] Forgets limitations  [x] Oriented to own ability 15  0 0      Total: 60     Based on the Assessment score: check the appropriate box.     []  No intervention needed   Low =   Score of 0-24    []  Use standard prevention interventions Moderate =  Score of 24-44   [] Give patient handout and discuss fall prevention strategies   [] Establish goal of education for patient/family RE: fall prevention strategies    [x]  Use high risk prevention interventions High = Score of 45 and higher   [x] Give patient handout and discuss fall prevention strategies   [x] Establish goal of education for patient/family Re: fall prevention strategies   [x] Discuss lifeline / other resources    Electronically signed by:   Wilder Morales PT  Date: 2018

## 2018-09-27 ENCOUNTER — HOSPITAL ENCOUNTER (OUTPATIENT)
Dept: PHYSICAL THERAPY | Facility: CLINIC | Age: 63
Setting detail: THERAPIES SERIES
Discharge: HOME | End: 2018-09-27
Payer: COMMERCIAL

## 2018-09-27 PROCEDURE — 97110 THERAPEUTIC EXERCISES: CPT

## 2018-09-27 PROCEDURE — 97032 APPL MODALITY 1+ESTIM EA 15: CPT

## 2018-09-27 NOTE — FLOWSHEET NOTE
5min    ankle effusion along lateral/dorsum R foot    VMS Estim-manual  8 min    Anterior tib   10 s on/20 s off with manual assist into max DF   ~24 MA today   Active DF in sitting X  After VMS         Review of HEP X  Pt demonstrated exercises 9/27/18   Other:     Specific Instructions for next treatment: VMS, ANTIGRAVITY STRENGTHENING (COMMON FIB MMs), ankle stability, calf flexibility/MFR       Treatment Charges: Mins Units   [x]  Modalities- VMS 8 1   [x]  Ther Exercise 20 1   []  Manual Therapy     []  Ther Activities     []  Aquatics     []  Vasocompression     []  Other     Total Treatment time 28 2       Assessment: [x] Progressing toward goals. Pt starts out today's session feeling overwhelmed and tearful. After discussion pt's outlook on her situation improved. Started with ankle PROM, then VMS with manual assist into DF. Kept the time at 8 min to avoid over fatigue. Reapplied kinesiotape which pt reports to be beneficial and describes it to feel good, and comforting. At the completion of PT session while pt was seated on the edge of the plinth she was able to DF in small range, but states she has not been able to do that at all. Was also able to palpate good muscle firing in the dorsiflexors. [] No change. [] Other:    Goals: (to be met in 12 treatments) (Start date: 9/24) *difficult to determine speed of recovery/heeling due to axonal damage  1. ? ROM:  a. Pt to exhibit greater than 30 deg of great toe extension on R foot actively for improved muscle activation. 2. ? Strength:  a. Pt to exhibit 2+/5 for R great toe extension for improved gait mechanics. b. Pt to exhibit 3/5 for ankle DF for improved gait mechanics and reduced use of AFO. 3. ? Function:  a. Pt to independently use home based estim unit for motor recruitment for ankle DF and great toe extensors to improve gait pattern, reduce falls. b. Pt to score less than 20% on the LEFS for improved function.    4. Independent with Home

## 2018-10-01 ENCOUNTER — HOSPITAL ENCOUNTER (OUTPATIENT)
Dept: PHYSICAL THERAPY | Facility: CLINIC | Age: 63
Setting detail: THERAPIES SERIES
Discharge: HOME | End: 2018-10-01
Payer: COMMERCIAL

## 2018-10-01 PROCEDURE — 97110 THERAPEUTIC EXERCISES: CPT

## 2018-10-01 PROCEDURE — 97032 APPL MODALITY 1+ESTIM EA 15: CPT

## 2018-10-01 NOTE — FLOWSHEET NOTE
[] Sam Alvarado       Outpatient Physical        Therapy       [address]       Phone: [phone]       Fax: [phone] [] [facility] at [address] [facility]       Phone: [phone]       Fax: [phone] [] [place]. [address] [facility]  [address] [place] [address]   Phone: [phone]   Fax:  [phone]     Physical Therapy Daily Treatment Note    Date:  10/1/2018  Patient Name:  Teofilo Martin    :  1955  MRN: [id number]  Physician: Jameson Franklin MD                         Insurance: : [place] [employer] (19 visits remaining, 45$$ co-pay)   Medical Diagnosis: G57.01 (ICD-10-CM) - Common peroneal neuropathy of right lower extremity                      Rehab Codes: R29.3, R60.0, M25.571, R26.2 NEC, R29.6, M62.571   Onset date: ~2017     Next Dr's appt. : tbd   Visit# / total visits: 3   Cancels/No Shows: 0    Subjective:    Pain:  [] Yes  [x] No Location:  N/A  Pain Rating: (0-10 scale) 0/10  Pain altered Tx:  [x] No  [] Yes  Action:     Comments: pt with no ankle pain, and also states the pain in her L hip from last session is resolved. Pt reports a noticeable improvement in active ankle movement after last session, lasting until about Saturday evening.        Objective:  Modalities:   Precautions:  Exercises:  Exercise Reps/ Time Weight/ Level Comments   R ankle PROM  X   Long sitting with foot of plinth elevated for pt to sit in reclined position   AntiG toe extension 20 x 3       AntiG ankle DF  20 x 3                  PROM- self stretch (toe flexors/extensors)          Toe curls (foot intrinsic)  x A/AA     Kt taping  5min    ankle effusion along lateral/dorsum R foot - not today taping from previous session remains well intact   VMS Estim-manual  10 min    Anterior tib   10 s on/20 s off with manual assist into max DF   ~28 MA today   Active DF in sitting 7x AA After VMS         Review of HEP X  Pt demonstrated exercises 9/27/18   Other:     Specific Instructions for next treatment: VMS, ANTIGRAVITY STRENGTHENING (COMMON FIB MMs), ankle stability, calf flexibility/MFR       Treatment Charges: Mins Units   [x]  Modalities- VMS 10 1   [x]  Ther Exercise 25 1   []  Manual Therapy     []  Ther Activities     []  Aquatics     []  Vasocompression     []  Other     Total Treatment time 30 2       Assessment: [x] Progressing toward goals. Pt with improved outlook today compared to previous session. Started treatment with PROM, followed by VMS with manual assist into DF during the on phase. After completion of VMS pt was able to DF on her own through greater ROM, still requiring assist into full ROM. [] No change. [] Other:    Goals: (to be met in 12 treatments) (Start date: 9/24) *difficult to determine speed of recovery/heeling due to axonal damage  1. ? ROM:  a. Pt to exhibit greater than 30 deg of great toe extension on R foot actively for improved muscle activation. 2. ? Strength:  a. Pt to exhibit 2+/5 for R great toe extension for improved gait mechanics. b. Pt to exhibit 3/5 for ankle DF for improved gait mechanics and reduced use of AFO. 3. ? Function:  a. Pt to independently use home based estim unit for motor recruitment for ankle DF and great toe extensors to improve gait pattern, reduce falls. b. Pt to score less than 20% on the LEFS for improved function. 4. Independent with Home Exercise Programs   5. Demonstrate Knowledge of fall prevention                    Patient goals:stimulate nerve more toe, improve muscle strength     Pt. Education:  [] Yes  [x] No  [] Reviewed Prior HEP/Ed  Method of Education: [] Verbal  [] Demo  [] Written  Comprehension of Education:  [] Verbalizes understanding. [] Demonstrates understanding. [] Needs review. [] Demonstrates/verbalizes HEP/Ed previously given. Pt reports compliance and demonstrates with proper form.       Plan: [x] Continue per plan of care.    [] Other:      Time In: 8:30am               Time Out: 9:10am    Electronically signed by:  Jessica Butterfield PTA

## 2018-10-04 ENCOUNTER — HOSPITAL ENCOUNTER (OUTPATIENT)
Dept: PHYSICAL THERAPY | Facility: CLINIC | Age: 63
Setting detail: THERAPIES SERIES
Discharge: HOME | End: 2018-10-04
Payer: COMMERCIAL

## 2018-10-04 PROCEDURE — 97110 THERAPEUTIC EXERCISES: CPT

## 2018-10-04 PROCEDURE — 97032 APPL MODALITY 1+ESTIM EA 15: CPT

## 2018-10-04 NOTE — FLOWSHEET NOTE
of purchasing an NMES unit for home and possible overworking weakness/fatigue of muscles with neurological weakness. Pt verbalizes understanding and continues to benefit from skilled therapeutic interventions in order to improve L ankle ROM and strength in order ot improve gait mechanics and return to PLOF. [] No change. [] Other:    Goals: (to be met in 12 treatments) (Start date: 9/24) *difficult to determine speed of recovery/heeling due to axonal damage  1. ? ROM:  a. Pt to exhibit greater than 30 deg of great toe extension on R foot actively for improved muscle activation. 2. ? Strength:  a. Pt to exhibit 2+/5 for R great toe extension for improved gait mechanics. b. Pt to exhibit 3/5 for ankle DF for improved gait mechanics and reduced use of AFO. 3. ? Function:  a. Pt to independently use home based estim unit for motor recruitment for ankle DF and great toe extensors to improve gait pattern, reduce falls. b. Pt to score less than 20% on the LEFS for improved function. 4. Independent with Home Exercise Programs   5. Demonstrate Knowledge of fall prevention                    Patient goals:stimulate nerve more toe, improve muscle strength     Pt. Education:  [x] Yes  [] No  [] Reviewed Prior HEP/Ed  Method of Education: [x] Verbal  [] Demo  [x] Written  Comprehension of Education:  [x] Verbalizes understanding. [] Demonstrates understanding. [] Needs review. [] Demonstrates/verbalizes HEP/Ed previously given. Pt reports compliance and demonstrates with proper form. Plan: [x] Continue per plan of care.    [] Other:      Time In: 920 am               Time Out: 958 am    Electronically signed by:  Deisi Mejía PT

## 2018-10-09 ENCOUNTER — HOSPITAL ENCOUNTER (OUTPATIENT)
Dept: PHYSICAL THERAPY | Facility: CLINIC | Age: 63
Setting detail: THERAPIES SERIES
Discharge: HOME | End: 2018-10-09
Payer: COMMERCIAL

## 2018-10-09 PROCEDURE — 97032 APPL MODALITY 1+ESTIM EA 15: CPT

## 2018-10-09 PROCEDURE — 97110 THERAPEUTIC EXERCISES: CPT

## 2018-10-09 NOTE — FLOWSHEET NOTE
[] [facility]       Outpatient Physical        Therapy       [address]       Phone: [phone]       Fax: [phone] [] [facility] at [address]       Phone: [phone]       Fax: [phone] [] Yaneth. 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 : tbd   Visit# / total visits:    Cancels/No Shows: 0    Subjective:    Pain:  [] Yes  [x] No Location:  N/A  Pain Rating: (0-10 scale) 0/10  Pain altered Tx:  [x] No  [] Yes  Action:     Comments: Pt denies pain this date. Pt reports she did better after last session and she was able to fly, drive in a car for 2 hours, and walk the rest of the day with continued ankle AROM. Pt notes the following day, she was still able to have some dorsiflexion, however, the R ankle became stiff due to presence of swelling but was not painful. Pt reports she has not ordered her stim unit as of yet.        Objective:  Modalities:   Precautions:  Exercises:  Exercise Reps/ Time Weight/ Level Comments   Pre-VMS      R ankle PROM  X  5'   Long sitting with foot of plinth elevated for pt to sit in reclined position   AROM assessment        Alphabet 1 cycle       VMS Estim-manual  8 min    Anterior tib   10 s on/20 s off with manual assist into max DF   ~28 MA today   Post VMS      AAROM with eccentric lowereing- DF 10x AA After VMS   EHL isometrics 10x     EHL actively for improved muscle activation. 2. ? Strength:  a. Pt to exhibit 2+/5 for R great toe extension for improved gait mechanics. b. Pt to exhibit 3/5 for ankle DF for improved gait mechanics and reduced use of AFO. 3. ? Function:  a. Pt to independently use home based estim unit for motor recruitment for ankle DF and great toe extensors to improve gait pattern, reduce falls. b. Pt to score less than 20% on the LEFS for improved function. 4. Independent with Home Exercise Programs   5. Demonstrate Knowledge of fall prevention                    Patient goals:stimulate nerve more toe, improve muscle strength     Pt. Education:  [x] Yes  [] No  [] Reviewed Prior HEP/Ed  Method of Education: [x] Verbal  [] Demo  [] Written  Comprehension of Education:  [x] Verbalizes understanding. [] Demonstrates understanding. [] Needs review. [] Demonstrates/verbalizes HEP/Ed previously given. Pt reports compliance and demonstrates with proper form. Plan: [x] Continue per plan of care.    [] Other:      Time In: 955 am               Time Out: 1030 am    Electronically signed by:  Norma Haynes, PT

## 2018-10-11 ENCOUNTER — HOSPITAL ENCOUNTER (OUTPATIENT)
Dept: PHYSICAL THERAPY | Facility: CLINIC | Age: 63
Setting detail: THERAPIES SERIES
Discharge: HOME | End: 2018-10-11
Payer: COMMERCIAL

## 2018-10-11 PROCEDURE — 97110 THERAPEUTIC EXERCISES: CPT

## 2018-10-11 PROCEDURE — 97032 APPL MODALITY 1+ESTIM EA 15: CPT

## 2018-10-11 NOTE — FLOWSHEET NOTE
[] [facility]       Outpatient Physical        Therapy       [address]       Phone: [phone]       Fax: [phone] [] [facility] at [address]       Phone: [phone]       Fax: [phone] [] Yaneth. 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 : tbd   Visit# / total visits:    Cancels/No Shows: 0    Subjective:    Pain:  [] Yes  [x] No Location:  N/A  Pain Rating: (0-10 scale) 0/10  Pain altered Tx:  [x] No  [] Yes  Action:     Comments: Pt denies pain this date but notes her R ankle was swollen yesterday secondary to wearing inappropriate shoes all day (too tight). Pt notes she did not have increased R ankle tiredness from the last session and reports she will be purchasing her NMES unit today. Pt reports she able to complete 4 reps of active dorsiflexion before she starts to notice fatigue.         Objective:  Modalities:   Precautions:  Exercises: bolded completed  Exercise Reps/ Time Weight/ Level Comments   Pre-VMS      R ankle PROM  X  4'   Long sitting with foot of plinth elevated for pt to sit in reclined position   AROM assessment        Alphabet 1 cycle       VMS Estim-manual  9 min    Anterior tib   10 s on/20 s off with manual assist into max DF   ~28 MA today   Post VMS      AAROM with eccentric lowereing- DF 10x AA After VMS   EHL isometrics 10x     EHL eccentric

## 2018-10-16 ENCOUNTER — HOSPITAL ENCOUNTER (OUTPATIENT)
Dept: PHYSICAL THERAPY | Facility: CLINIC | Age: 63
Setting detail: THERAPIES SERIES
Discharge: HOME | End: 2018-10-16
Payer: COMMERCIAL

## 2018-10-16 PROCEDURE — 97032 APPL MODALITY 1+ESTIM EA 15: CPT

## 2018-10-16 PROCEDURE — 97110 THERAPEUTIC EXERCISES: CPT

## 2018-10-16 NOTE — FLOWSHEET NOTE
EHL   Post VMS      AAROM with eccentric lowereing- DF 10x AA After VMS  Able to hold in about a 90 deg position   EHL isometrics 10x     Great toe wiggling x     Education X  Educated on completing alphabet, isometrics for EHL, and educated on color changes   10/16/18  Pre-VMS:   - Seated DF: 55-->65 deg (10 deg excursion)  - Long sitting DF: 2 deg excursion  - EHL: minimal activity  Post VMS:   -  Long sitting DF: 6 deg excursion  - EHL: able to consistently wiggle the R great toe         Specific Instructions for next treatment: VMS, ANTIGRAVITY STRENGTHENING (COMMON FIB MMs), ankle stability, calf flexibility/MFR       Treatment Charges: Mins Units   [x]  Modalities- VMS 9 1   [x]  Ther Exercise 20 1   []  Manual Therapy     []  Ther Activities     []  Aquatics     []  Vasocompression     []  Other     Total Treatment time 29 2       Assessment: [x] Progressing toward goals. Pt continues to start the sessions with small ranges of DF against gravity which increases following use of VMS. Pt with minimal EHL activity this date prior to VMS, however, following VMS, pt was able to wiggle the R great toe in both flexion and extension with flexion being with greater dominance. Prior to VMS, pt's long-sitting DF with anterior tib is primarily dominated by the inversion moment, however, this improved following VMS. Pt verbalizes understanding and continues to benefit from skilled therapeutic interventions in order to improve L ankle ROM and strength in order ot improve gait mechanics and return to PLOF. [] No change. [] Other:    Goals: (to be met in 12 treatments) (Start date: 9/24) *difficult to determine speed of recovery/heeling due to axonal damage  1. ? ROM:  a. Pt to exhibit greater than 30 deg of great toe extension on R foot actively for improved muscle activation. 2. ? Strength:  a. Pt to exhibit 2+/5 for R great toe extension for improved gait mechanics.    b. Pt to exhibit 3/5 for ankle DF for

## 2018-10-18 ENCOUNTER — HOSPITAL ENCOUNTER (OUTPATIENT)
Dept: PHYSICAL THERAPY | Facility: CLINIC | Age: 63
Setting detail: THERAPIES SERIES
Discharge: HOME | End: 2018-10-18
Payer: COMMERCIAL

## 2018-10-18 PROCEDURE — 97110 THERAPEUTIC EXERCISES: CPT

## 2018-10-18 PROCEDURE — 97032 APPL MODALITY 1+ESTIM EA 15: CPT

## 2018-10-18 NOTE — FLOWSHEET NOTE
[] [facility]       Outpatient Physical        Therapy       [address]       Phone: [phone]       Fax: [phone] [] [facility] at [address]       Phone: [phone]       Fax: [phone] [] Yaneth. 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 : tbd   Visit# / total visits:    Cancels/No Shows: 0    Subjective:    Pain:  [] Yes  [x] No  Location:  N/A  Pain Rating: (0-10 scale) 0/10  Pain altered Tx:  [x] No  [] Yes  Action:     Comments: Pt continues to report improvement with her stiffness. Also, states how much more she is able to move her ankle actively. Pt states that at night she takes her shoes off and notices that she no longer has to hike her hip to walk.      Objective:  Modalities:   Precautions:  Exercises: bolded completed  Exercise Reps/ Time Weight/ Level Comments   Pre-VMS      R ankle PROM  X   Long sitting with foot of plinth elevated for pt to sit in reclined position   AROM assessment     See measurements below   Alphabet 1 cycle       VMS Estim-manual- used pt's home unit this date, giving education on setup/use for home  10 min    Anterior tib   10 s on/20 s off with manual assist into max DF   ~20 MA today  Modified patch placement on the shank to attempt to isolate anterior tib and EHL  Home unit parameters= 31MA, 10 min, 6 sec on/12 sec off   Post VMS AAROM with eccentric lowereing- DF 10x AA After VMS  Able to hold in about a 90 deg position   EHL isometrics 10x     Great toe wiggling x     Education X  Educated on completing alphabet, isometrics for EHL, and educated on color changes     10/16/18  Pre-VMS:   - Seated DF: 55-->65 deg (10 deg excursion)  - Long sitting DF: 2 deg excursion  - EHL: minimal activity  Post VMS:   -  Long sitting DF: 6 deg excursion  - EHL: able to consistently wiggle the R great toe         Specific Instructions for next treatment: VMS, ANTIGRAVITY STRENGTHENING (COMMON FIB MMs), ankle stability, calf flexibility/MFR       Treatment Charges: Mins Units   [x]  Modalities- VMS 10 1   [x]  Ther Exercise 15 1   []  Manual Therapy     []  Ther Activities     []  Aquatics     []  Vasocompression     []  Other     Total Treatment time 25 2       Assessment: [x] Progressing toward goals. Pt comes to PT with NMES unit she purchased yesterday. Reviewed unit with pt, set up and parameters appropriate for home use. Pt tolerates well, but states she prefers the settings used during PT on the VMS unit. Will cont to use VMS during PT appts. [] No change. [] Other:    Goals: (to be met in 12 treatments) (Start date: 9/24) *difficult to determine speed of recovery/heeling due to axonal damage  1. ? ROM:  a. Pt to exhibit greater than 30 deg of great toe extension on R foot actively for improved muscle activation. 2. ? Strength:  a. Pt to exhibit 2+/5 for R great toe extension for improved gait mechanics. b. Pt to exhibit 3/5 for ankle DF for improved gait mechanics and reduced use of AFO. 3. ? Function:  a. Pt to independently use home based estim unit for motor recruitment for ankle DF and great toe extensors to improve gait pattern, reduce falls. b. Pt to score less than 20% on the LEFS for improved function. 4. Independent with Home Exercise Programs   5.  Demonstrate Knowledge of fall prevention                    Patient

## 2018-10-25 ENCOUNTER — HOSPITAL ENCOUNTER (OUTPATIENT)
Dept: PHYSICAL THERAPY | Facility: CLINIC | Age: 63
Setting detail: THERAPIES SERIES
Discharge: HOME | End: 2018-10-25
Payer: COMMERCIAL

## 2018-10-25 NOTE — FLOWSHEET NOTE
[] [address]  [address]    [phone]  F: [phone] [] [address] [facility] [address]   [address]  P: [phone]  F: [phone] [] [address]  [address] [facility]  P: [phone]  F: [phone] [] [address]  [facility]   [address]   [place]: [phone]  F: [phone] [] [address]  [place]: [phone]   F: [phone]      Physical Therapy Cancel/No Show note    Date: 10/25/2018  Patient: Dung Funez  : 1955  MRN: [id number]    Cancels/No Shows to date: 1 cx    For today's appointment patient:  [x]  Cancelled  []  Rescheduled appointment  []  No-show     Reason given by patient:  []  Patient ill  []  Conflicting appointment  []  No transportation    []  Conflict with work  [x]  No reason given  []  Weather related  []  Other:      Comments:      [x]  Next appointment was confirmed    Electronically signed by: Lou Oliver PT

## 2018-10-29 ENCOUNTER — HOSPITAL ENCOUNTER (OUTPATIENT)
Dept: PHYSICAL THERAPY | Facility: CLINIC | Age: 63
Setting detail: THERAPIES SERIES
Discharge: HOME | End: 2018-10-29
Payer: COMMERCIAL

## 2018-11-05 ENCOUNTER — HOSPITAL ENCOUNTER (OUTPATIENT)
Dept: PHYSICAL THERAPY | Facility: CLINIC | Age: 63
Setting detail: THERAPIES SERIES
Discharge: HOME | End: 2018-11-05
Payer: COMMERCIAL

## 2018-11-05 PROCEDURE — 97140 MANUAL THERAPY 1/> REGIONS: CPT

## 2018-11-05 PROCEDURE — 97032 APPL MODALITY 1+ESTIM EA 15: CPT

## 2018-11-05 NOTE — FLOWSHEET NOTE
[] [facility]       Outpatient Physical        Therapy       [address]       Phone: [phone]       Fax: [phone] [] [facility] at [address]       Phone: [phone]       Fax: [phone] [] [address] 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 : tbd   Visit# / total visits:    Cancels/No Shows: 3    Subjective:    Pain:  [] Yes  [x] No  Location:  N/A  Pain Rating: (0-10 scale) 0/10  Pain altered Tx:  [x] No  [] Yes  Action:     Comments: Pt reports she has been working in the yard and her R knee has been hurting. Pt reports she feels this may be do to working in the yard. Pt states she worked on Friday and the R ankle is stiff. Pt notes she has been doing her exercises frequently and using her stim machine.       Objective:  Modalities:   Precautions:  Exercises: bolded completed  Exercise Reps/ Time Weight/ Level Comments   Pre-VMS      R ankle PROM/manual to R gastroc 8'   Manual stretching of the gastroc/soleus, trigger point release in the R calf, contract/relax with the R calf   AROM assessment     See measurements below   Alphabet 1 cycle       VMS Estim-manual- used pt's home unit this date, giving education on setup/use for home  10 min    Anterior tib   10 s on/20 s off with manual assist into max DF   ~22 MA today  Modified patch placement on the shank to attempt to isolate

## 2018-11-12 ENCOUNTER — HOSPITAL ENCOUNTER (OUTPATIENT)
Dept: PHYSICAL THERAPY | Facility: CLINIC | Age: 63
Setting detail: THERAPIES SERIES
Discharge: HOME | End: 2018-11-12
Payer: COMMERCIAL

## 2018-11-12 PROCEDURE — 97032 APPL MODALITY 1+ESTIM EA 15: CPT

## 2018-11-12 PROCEDURE — 97110 THERAPEUTIC EXERCISES: CPT

## 2018-11-19 ENCOUNTER — HOSPITAL ENCOUNTER (OUTPATIENT)
Dept: PHYSICAL THERAPY | Facility: CLINIC | Age: 63
Setting detail: THERAPIES SERIES
Discharge: HOME | End: 2018-11-19
Payer: COMMERCIAL

## 2018-11-19 PROCEDURE — 97032 APPL MODALITY 1+ESTIM EA 15: CPT

## 2018-11-19 PROCEDURE — 97110 THERAPEUTIC EXERCISES: CPT

## 2018-11-19 NOTE — FLOWSHEET NOTE
eccentric lowereing- DF 10x AA After VMS  Able to hold in about a 90 deg position   EHL isometrics 10x     Seated toe tapping 10x  Minimal excursion   Great toe wiggling x     Education X  Educated on completing alphabet, isometrics for EHL, and educated on color changes     11/19/18  Pre-VMS:   - Seated DF: (10 deg excursion)  - Long sitting DF: 10 deg excursion  - EHL: minimal activity; pt reports greater toe numbness this date  Post VMS: 8 degrees excursion, notable fatigue in dorsiflexors is appreciated along with tightness over the anterior compartment             Specific Instructions for next treatment: VMS, ANTIGRAVITY STRENGTHENING (COMMON FIB MMs), ankle stability, calf flexibility/MFR       Treatment Charges: Mins Units   [x]  Modalities- VMS 10 1   [x]  Ther Exercise 13 1   []  Manual Therapy     []  Ther Activities     []  Aquatics     []  Vasocompression     []  Other     Total Treatment time 23 2       Assessment: [x] Progressing toward goals. Pt continuing to present with similar measurements at the beginning and end of the session with dorsiflexor activation. Pt continues to demonstrate decreased overall great toe extension on the R and inability to achieve neutral R foot positioning to assist with ambulation without an AFO. Pt with tightness noted in the anterior compartment of the R shin secondary to overworking of the R dorsiflexors. Pt has been advised to complete seated toe tapping exercises every 2 days in order to promote proper resting time for the neurologically weak musculature in the RLE. Pt continues to benefit from weekly therapy sessions to monitor progress and potential declines in function. Pt is currently presenting with a plateau of improvement at this time.        [] Other:    Goals: (to be met in 12 treatments) (Start date: 9/24) *difficult to determine speed of recovery/heeling due to axonal damage  1. ? ROM:  a. Pt to exhibit greater than 30 deg of great toe extension on R foot

## 2018-11-26 ENCOUNTER — HOSPITAL ENCOUNTER (OUTPATIENT)
Age: 63
Discharge: HOME | End: 2018-11-26
Payer: COMMERCIAL

## 2018-11-26 LAB
-: ABNORMAL
AMORPHOUS: ABNORMAL
BACTERIA: ABNORMAL
CASTS UA: ABNORMAL /LPF (ref 0–2)
CRYSTALS, UA: ABNORMAL /HPF
EPITHELIAL CELLS UA: ABNORMAL /HPF
MUCUS: ABNORMAL
OTHER OBSERVATIONS UA: ABNORMAL
RBC UA: ABNORMAL /HPF (ref 0–2)
RENAL EPITHELIAL, UA: ABNORMAL /HPF
TRICHOMONAS: ABNORMAL
WBC UA: ABNORMAL /HPF (ref 0–5)
YEAST: ABNORMAL

## 2018-11-26 PROCEDURE — 81015 MICROSCOPIC EXAM OF URINE: CPT

## 2018-11-27 ENCOUNTER — HOSPITAL ENCOUNTER (OUTPATIENT)
Dept: PHYSICAL THERAPY | Facility: CLINIC | Age: 63
Setting detail: THERAPIES SERIES
Discharge: HOME | End: 2018-11-27
Payer: COMMERCIAL

## 2018-11-27 ENCOUNTER — HOSPITAL ENCOUNTER (OUTPATIENT)
Facility: CLINIC | Age: 63
Discharge: HOME | End: 2018-11-29
Payer: COMMERCIAL

## 2018-11-27 ENCOUNTER — HOSPITAL ENCOUNTER (OUTPATIENT)
Dept: GENERAL RADIOLOGY | Facility: CLINIC | Age: 63
Discharge: HOME | End: 2018-11-29
Payer: COMMERCIAL

## 2018-11-27 ENCOUNTER — HOSPITAL ENCOUNTER (OUTPATIENT)
Facility: CLINIC | Age: 63
Discharge: HOME | End: 2018-11-27
Payer: COMMERCIAL

## 2018-11-27 DIAGNOSIS — N20.0 CALCULUS OF KIDNEY: ICD-10-CM

## 2018-11-27 LAB
-: ABNORMAL
AMORPHOUS: ABNORMAL
BACTERIA: ABNORMAL
BILIRUBIN URINE: NEGATIVE
CASTS UA: ABNORMAL /LPF (ref 0–2)
COLOR: ABNORMAL
COMMENT UA: ABNORMAL
CRYSTALS, UA: ABNORMAL /HPF
EPITHELIAL CELLS UA: ABNORMAL /HPF (ref 0–5)
GLUCOSE URINE: NEGATIVE
KETONES, URINE: NEGATIVE
LEUKOCYTE ESTERASE, URINE: ABNORMAL
MUCUS: ABNORMAL
NITRITE, URINE: NEGATIVE
OTHER OBSERVATIONS UA: ABNORMAL
PH UA: 6.5 (ref 5–8)
PROTEIN UA: ABNORMAL
RBC UA: ABNORMAL /HPF (ref 0–2)
RENAL EPITHELIAL, UA: ABNORMAL /HPF
SPECIFIC GRAVITY UA: 1.01 (ref 1–1.03)
TRICHOMONAS: ABNORMAL
TURBIDITY: CLEAR
URINE HGB: ABNORMAL
UROBILINOGEN, URINE: NORMAL
WBC UA: ABNORMAL /HPF (ref 0–5)
YEAST: ABNORMAL

## 2018-11-27 PROCEDURE — 81001 URINALYSIS AUTO W/SCOPE: CPT

## 2018-11-27 PROCEDURE — 87086 URINE CULTURE/COLONY COUNT: CPT

## 2018-11-27 PROCEDURE — 74018 RADEX ABDOMEN 1 VIEW: CPT

## 2018-11-27 PROCEDURE — 97110 THERAPEUTIC EXERCISES: CPT

## 2018-11-27 PROCEDURE — 97032 APPL MODALITY 1+ESTIM EA 15: CPT

## 2018-11-27 NOTE — FLOWSHEET NOTE
[] [facility]       Outpatient Physical        Therapy       [address]       Phone: [phone]       Fax: [phone] [] [facility] at [address] [facility]       Phone: [phone]       Fax: [phone] [] [place]. 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 : tbd   Visit# / total visits:    Cancels/No Shows: 3    Subjective:    Pain:  [] Yes  [x] No  Location:  N/A  Pain Rating: (0-10 scale) 0/10  Pain altered Tx:  [x] No  [] Yes  Action:     Comments: Pt reports she hasn't been feeling well last week. Pt reports she feels pretty good with the R foot. Pt notes the R ankle is not as stiff this week but she did not work Friday. Pt reports with the machine at home, she feels like she is getting plateaued because she can't get the machine to turn up higher.        Objective:  Modalities:   Precautions:  Exercises: bolded completed  Exercise Reps/ Time Weight/ Level Comments   Pre-VMS      R ankle PROM/stretching to anterior tib x      AROM assessment     See measurements below   Alphabet 1 cycle       VMS Estim-manual- used pt's home unit this date, giving education on setup/use for home  10 min    Anterior tib   10 s on/20 s off with manual assist into max DF   ~33 MA today  Modified patch placement on the shank to attempt to isolate anterior tib and EHL  Home unit parameters= 31MA, 10 min, 6 sec on/12 sec off   Post VMS      PROM/manual to anterior compartment x     Ankle pumps 10x     AAROM with eccentric lowereing- DF 10x AA After VMS  Able to hold in about a 90 deg position  Able to lift R foot against gravity with a 15 degree deficit from neutral   Prone dorsiflexion  10x     Education X  Educated on completing alphabet, isometrics for EHL, and educated on color changes   11/27/18  Active dorsiflexion: 15 degrees; 15 degrees from neutral  * Able to hold ankle in neutral position if manual placed at 90 degrees  Passive great toe extension: 30 degrees  Active great toe extension: 1/5-- minimal movement appreciated this date  Sensation: numbness in deep peroneal cutaneous innervation; numbness noted in 1st and 2nd toe  LEFI: 31% impairment     Specific Instructions for next treatment: VMS, ANTIGRAVITY STRENGTHENING (COMMON FIB MMs), ankle stability, calf flexibility/MFR       Treatment Charges: Mins Units   [x]  Modalities- VMS 10 1   [x]  Ther Exercise 20 1   []  Manual Therapy     []  Ther Activities     []  Aquatics     []  Vasocompression     []  Other     Total Treatment time 30 2       Assessment: [x] Progressing toward goals. Pt continues to present with minimal great toe extension on the R and maintaining R ankle dorsiflexion to about 15 degrees. Pt does lack 15 degrees of active dorsiflexion to achieve a neutral position secondary to weakness against gravity. Pt continues to note numbness int he deep peroneal cutaneous nerve innervation and does have numbness in the 1st and 2nd digit. 2 additional visits will be requested at this time secondary to pt's good response to VMS in the clinic to supplement her home unit. Pt has been given additional exercises to complete at home to promote dorsiflexion strengthening from 15-0 degrees to achieve neutral without resistance from gravity.        [] Other:    Goals: (to be met in 12 treatments) (Start date: 9/24) *difficult to determine speed of

## 2018-11-29 LAB
CULTURE: NO GROWTH
Lab: NORMAL
SPECIMEN DESCRIPTION: NORMAL
STATUS: NORMAL

## 2018-12-04 ENCOUNTER — HOSPITAL ENCOUNTER (OUTPATIENT)
Dept: PHYSICAL THERAPY | Facility: CLINIC | Age: 63
Setting detail: THERAPIES SERIES
Discharge: HOME | End: 2018-12-04
Payer: COMMERCIAL

## 2018-12-04 PROCEDURE — 97110 THERAPEUTIC EXERCISES: CPT

## 2018-12-04 PROCEDURE — 97032 APPL MODALITY 1+ESTIM EA 15: CPT

## 2018-12-13 ENCOUNTER — HOSPITAL ENCOUNTER (OUTPATIENT)
Dept: PHYSICAL THERAPY | Facility: CLINIC | Age: 63
Setting detail: THERAPIES SERIES
Discharge: HOME | End: 2018-12-13
Payer: COMMERCIAL

## 2018-12-13 PROCEDURE — 97032 APPL MODALITY 1+ESTIM EA 15: CPT

## 2018-12-13 PROCEDURE — 97110 THERAPEUTIC EXERCISES: CPT

## 2018-12-13 NOTE — DISCHARGE SUMMARY
[] [facility]) - [facility]  Therapy  [address]  [phone]  F: [phone] [x] [address]  [facility]   [address]  P: [phone]  F: [phone] [] [place]  [address]  P: [phone]  F: [phone] [] [address]  [facility]   [address]   [place]: [phone]  F: [phone]     Physical Therapy Discharge Note    Date: 2018      Patient: Malinda Wilkes  : 1955  MRN: [id number]    Physician: Nereyda Amezcua MD                         Insurance: : [employer] remaining, 45$$ co-pay)   Medical Diagnosis: G57.01 (ICD-10-CM) - 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 : tbd   Visit# / total visits:  (updated 18)                                    Cancels/No Shows: 3 cx/1 ns  Date of initial visit: 18                Date of final visit: 18      Subjective:    Pain:  [] Yes  [x] No                      Location:  N/A             Pain Rating: (0-10 scale) 0/10  Pain altered Tx:  [x] No  [] Yes  Action:      Comments: Pt reports her ankle is doing well. Pt states she hasn't had a change to rub her shin before coming in this morning. Pt reports she does not notice any swelling in the R ankle unless she is working on Friday's. Pt also notes she did get her machine to work again and is using it at home.          Objective:  18  Active dorsiflexion: 15 degrees (in sitting)  Upright sittin degrees  * ankle held in 90 degrees of DF, ankle drops to 10 degrees when instructed to hold in neutral position; pt with great difficulty engaging dorisflexors for the last 10 degrees of DF  Active DF: 3-/5, unable to hold against resistance     Active great toe extension: 2-/5-- improved great toe movement this date  Effusion: 50 cm on RLE  Sensation: numbness in deep peroneal cutaneous innervation; numbness noted in 1st and 2nd toe  LEFI: 34.2% impairment    Assessment:  Pt continues to demonstrate lack of strength in the RLE dorisflexors and EHL. Pt continues to lack DF strength in the last 10 degrees from neutral to maintain neutral foot alignment. Pt with improved R great toe extension to achieve a neutral position this date, however, is unable to perform active extension past neutral against gravity. Pt has demonstrated progressions in muscle activation, however, due to extent of neural damage and recovery time, progressions are limited. Pt has been highly encouraged to continue completing her HEP in order to allow for continued progressions and maintenance of current muscle activation. Verbally reviewed HEP with pt verbalizing good recall. Pt will be discharged at this time with adherence to HEP.         Goals: (to be met in 12 treatments) (Start date: 9/24) *difficult to determine speed of recovery/heeling due to axonal damage  1. ? ROM:  a. Pt to exhibit greater than 30 deg of great toe extension on R foot actively for improved muscle activation. NOT MET 11/27 (passive: 30 degrees to assist with push-off during gait)  2. ? Strength:  a. Pt to exhibit 2+/5 for R great toe extension for improved gait mechanics. NOT MET 12/13  b. Pt to exhibit 3/5 for ankle DF for improved gait mechanics and reduced use of AFO. Progressing toward goal 12/13  3. ? Function:  a. Pt to independently use home based estim unit for motor recruitment for ankle DF and great toe extensors to improve gait pattern, reduce falls. MET 11/27/18   b. Pt to score less than 20% on the LEFS for improved function. NOT MET 12/13  4. Independent with Home Exercise Programs MET 11/27  5.  Demonstrate Knowledge of fall prevention MET

## 2018-12-18 ENCOUNTER — HOSPITAL ENCOUNTER (OUTPATIENT)
Dept: CT IMAGING | Facility: CLINIC | Age: 63
Discharge: HOME | End: 2018-12-20
Payer: COMMERCIAL

## 2018-12-18 DIAGNOSIS — R31.0 GROSS HEMATURIA: ICD-10-CM

## 2018-12-18 LAB
BUN BLDV-MCNC: 22 MG/DL (ref 8–23)
CREAT SERPL-MCNC: 0.7 MG/DL (ref 0.5–0.9)
GFR AFRICAN AMERICAN: >60 ML/MIN
GFR NON-AFRICAN AMERICAN: >60 ML/MIN
GFR SERPL CREATININE-BSD FRML MDRD: NORMAL ML/MIN/{1.73_M2}
GFR SERPL CREATININE-BSD FRML MDRD: NORMAL ML/MIN/{1.73_M2}

## 2018-12-18 PROCEDURE — 84520 ASSAY OF UREA NITROGEN: CPT

## 2018-12-18 PROCEDURE — 6360000004 HC RX CONTRAST MEDICATION: Performed by: UROLOGY

## 2018-12-18 PROCEDURE — 74178 CT ABD&PLV WO CNTR FLWD CNTR: CPT

## 2018-12-18 PROCEDURE — 2580000003 HC RX 258: Performed by: UROLOGY

## 2018-12-18 PROCEDURE — 82565 ASSAY OF CREATININE: CPT

## 2018-12-18 RX ORDER — SODIUM CHLORIDE 0.9 % (FLUSH) 0.9 %
10 SYRINGE (ML) INJECTION PRN
Status: DISCONTINUED | OUTPATIENT
Start: 2018-12-18 | End: 2018-12-21 | Stop reason: HOSPADM

## 2018-12-18 RX ORDER — 0.9 % SODIUM CHLORIDE 0.9 %
70 INTRAVENOUS SOLUTION INTRAVENOUS ONCE
Status: COMPLETED | OUTPATIENT
Start: 2018-12-18 | End: 2018-12-18

## 2018-12-18 RX ADMIN — SODIUM CHLORIDE 70 ML: 9 INJECTION, SOLUTION INTRAVENOUS at 08:42

## 2018-12-18 RX ADMIN — IOPAMIDOL 125 ML: 755 INJECTION, SOLUTION INTRAVENOUS at 08:42

## 2018-12-18 RX ADMIN — Medication 10 ML: at 08:43

## 2019-01-10 ENCOUNTER — HOSPITAL ENCOUNTER (OUTPATIENT)
Facility: CLINIC | Age: 64
Discharge: HOME | End: 2019-01-10
Payer: COMMERCIAL

## 2019-01-10 LAB
-: ABNORMAL
AMORPHOUS: ABNORMAL
BACTERIA: ABNORMAL
BILIRUBIN URINE: NEGATIVE
CASTS UA: ABNORMAL /LPF (ref 0–2)
COLOR: YELLOW
CRYSTALS, UA: ABNORMAL /HPF
EPITHELIAL CELLS UA: ABNORMAL /HPF (ref 0–5)
GLUCOSE URINE: NEGATIVE
KETONES, URINE: ABNORMAL
LEUKOCYTE ESTERASE, URINE: ABNORMAL
MUCUS: ABNORMAL
NITRITE, URINE: NEGATIVE
OTHER OBSERVATIONS UA: ABNORMAL
PH UA: 5 (ref 5–8)
PROTEIN UA: ABNORMAL
RBC UA: ABNORMAL /HPF (ref 0–2)
RENAL EPITHELIAL, UA: ABNORMAL /HPF
SPECIFIC GRAVITY UA: 1.03 (ref 1–1.03)
TRICHOMONAS: ABNORMAL
TURBIDITY: ABNORMAL
URINE HGB: ABNORMAL
UROBILINOGEN, URINE: NORMAL
WBC UA: ABNORMAL /HPF (ref 0–5)
YEAST: ABNORMAL

## 2019-01-10 PROCEDURE — 81001 URINALYSIS AUTO W/SCOPE: CPT

## 2019-01-13 ENCOUNTER — HOSPITAL ENCOUNTER (OUTPATIENT)
Facility: CLINIC | Age: 64
Discharge: HOME | End: 2019-01-13
Payer: COMMERCIAL

## 2019-01-13 LAB
EKG ATRIAL RATE: 73 BPM
EKG P AXIS: 63 DEGREES
EKG P-R INTERVAL: 142 MS
EKG Q-T INTERVAL: 420 MS
EKG QRS DURATION: 96 MS
EKG QTC CALCULATION (BAZETT): 462 MS
EKG R AXIS: -12 DEGREES
EKG T AXIS: 37 DEGREES
EKG VENTRICULAR RATE: 73 BPM

## 2019-01-13 PROCEDURE — 93005 ELECTROCARDIOGRAM TRACING: CPT

## 2019-01-15 ENCOUNTER — ANESTHESIA EVENT (OUTPATIENT)
Dept: OPERATING ROOM | Age: 64
End: 2019-01-15
Payer: COMMERCIAL

## 2019-01-15 ENCOUNTER — APPOINTMENT (OUTPATIENT)
Dept: GENERAL RADIOLOGY | Age: 64
End: 2019-01-15
Attending: UROLOGY
Payer: COMMERCIAL

## 2019-01-15 ENCOUNTER — ANESTHESIA (OUTPATIENT)
Dept: OPERATING ROOM | Age: 64
End: 2019-01-15
Payer: COMMERCIAL

## 2019-01-15 ENCOUNTER — HOSPITAL ENCOUNTER (OUTPATIENT)
Age: 64
Setting detail: OUTPATIENT SURGERY
Discharge: HOME | End: 2019-01-15
Attending: UROLOGY | Admitting: UROLOGY
Payer: COMMERCIAL

## 2019-01-15 VITALS
RESPIRATION RATE: 16 BRPM | HEIGHT: 62 IN | SYSTOLIC BLOOD PRESSURE: 177 MMHG | BODY MASS INDEX: 31.47 KG/M2 | OXYGEN SATURATION: 98 % | HEART RATE: 87 BPM | DIASTOLIC BLOOD PRESSURE: 94 MMHG | TEMPERATURE: 98.2 F | WEIGHT: 171 LBS

## 2019-01-15 VITALS
RESPIRATION RATE: 4 BRPM | OXYGEN SATURATION: 97 % | DIASTOLIC BLOOD PRESSURE: 68 MMHG | SYSTOLIC BLOOD PRESSURE: 117 MMHG

## 2019-01-15 DIAGNOSIS — N20.0 KIDNEY STONES: Primary | ICD-10-CM

## 2019-01-15 LAB — GLUCOSE BLD-MCNC: 116 MG/DL (ref 65–105)

## 2019-01-15 PROCEDURE — 7100000010 HC PHASE II RECOVERY - FIRST 15 MIN: Performed by: UROLOGY

## 2019-01-15 PROCEDURE — 2580000003 HC RX 258: Performed by: ANESTHESIOLOGY

## 2019-01-15 PROCEDURE — 2709999900 HC NON-CHARGEABLE SUPPLY: Performed by: UROLOGY

## 2019-01-15 PROCEDURE — 82947 ASSAY GLUCOSE BLOOD QUANT: CPT

## 2019-01-15 PROCEDURE — 7100000001 HC PACU RECOVERY - ADDTL 15 MIN: Performed by: UROLOGY

## 2019-01-15 PROCEDURE — 7100000000 HC PACU RECOVERY - FIRST 15 MIN: Performed by: UROLOGY

## 2019-01-15 PROCEDURE — 6360000002 HC RX W HCPCS: Performed by: UROLOGY

## 2019-01-15 PROCEDURE — 6360000002 HC RX W HCPCS: Performed by: ANESTHESIOLOGY

## 2019-01-15 PROCEDURE — 3209999900 FLUORO FOR SURGICAL PROCEDURES

## 2019-01-15 PROCEDURE — 2500000003 HC RX 250 WO HCPCS

## 2019-01-15 PROCEDURE — 6360000002 HC RX W HCPCS

## 2019-01-15 PROCEDURE — 3700000000 HC ANESTHESIA ATTENDED CARE: Performed by: UROLOGY

## 2019-01-15 PROCEDURE — C2617 STENT, NON-COR, TEM W/O DEL: HCPCS | Performed by: UROLOGY

## 2019-01-15 PROCEDURE — C1769 GUIDE WIRE: HCPCS | Performed by: UROLOGY

## 2019-01-15 PROCEDURE — 7100000011 HC PHASE II RECOVERY - ADDTL 15 MIN: Performed by: UROLOGY

## 2019-01-15 PROCEDURE — 7100000030 HC ASPR PHASE II RECOVERY - FIRST 15 MIN: Performed by: UROLOGY

## 2019-01-15 PROCEDURE — 3600000003 HC SURGERY LEVEL 3 BASE: Performed by: UROLOGY

## 2019-01-15 PROCEDURE — 3600000013 HC SURGERY LEVEL 3 ADDTL 15MIN: Performed by: UROLOGY

## 2019-01-15 PROCEDURE — 74018 RADEX ABDOMEN 1 VIEW: CPT

## 2019-01-15 PROCEDURE — 2720000010 HC SURG SUPPLY STERILE: Performed by: UROLOGY

## 2019-01-15 PROCEDURE — 3700000001 HC ADD 15 MINUTES (ANESTHESIA): Performed by: UROLOGY

## 2019-01-15 PROCEDURE — 7100000031 HC ASPR PHASE II RECOVERY - ADDTL 15 MIN: Performed by: UROLOGY

## 2019-01-15 PROCEDURE — C1758 CATHETER, URETERAL: HCPCS | Performed by: UROLOGY

## 2019-01-15 DEVICE — STENT URET 6FR L24CM PERCFLX HYDR+ DBL PGTL THRD 2: Type: IMPLANTABLE DEVICE | Status: FUNCTIONAL

## 2019-01-15 RX ORDER — MORPHINE SULFATE 2 MG/ML
1 INJECTION, SOLUTION INTRAMUSCULAR; INTRAVENOUS EVERY 5 MIN PRN
Status: DISCONTINUED | OUTPATIENT
Start: 2019-01-15 | End: 2019-01-15 | Stop reason: HOSPADM

## 2019-01-15 RX ORDER — CIPROFLOXACIN 2 MG/ML
400 INJECTION, SOLUTION INTRAVENOUS
Status: COMPLETED | OUTPATIENT
Start: 2019-01-15 | End: 2019-01-15

## 2019-01-15 RX ORDER — HYDROCODONE BITARTRATE AND ACETAMINOPHEN 5; 325 MG/1; MG/1
1 TABLET ORAL EVERY 6 HOURS PRN
Qty: 10 TABLET | Refills: 0 | Status: ON HOLD | OUTPATIENT
Start: 2019-01-15 | End: 2019-01-19 | Stop reason: HOSPADM

## 2019-01-15 RX ORDER — PROPOFOL 10 MG/ML
INJECTION, EMULSION INTRAVENOUS PRN
Status: DISCONTINUED | OUTPATIENT
Start: 2019-01-15 | End: 2019-01-15 | Stop reason: SDUPTHER

## 2019-01-15 RX ORDER — FENTANYL CITRATE 50 UG/ML
INJECTION, SOLUTION INTRAMUSCULAR; INTRAVENOUS PRN
Status: DISCONTINUED | OUTPATIENT
Start: 2019-01-15 | End: 2019-01-15 | Stop reason: SDUPTHER

## 2019-01-15 RX ORDER — DEXAMETHASONE SODIUM PHOSPHATE 4 MG/ML
INJECTION, SOLUTION INTRA-ARTICULAR; INTRALESIONAL; INTRAMUSCULAR; INTRAVENOUS; SOFT TISSUE PRN
Status: DISCONTINUED | OUTPATIENT
Start: 2019-01-15 | End: 2019-01-15 | Stop reason: SDUPTHER

## 2019-01-15 RX ORDER — GLYCOPYRROLATE 1 MG/5 ML
SYRINGE (ML) INTRAVENOUS PRN
Status: DISCONTINUED | OUTPATIENT
Start: 2019-01-15 | End: 2019-01-15 | Stop reason: SDUPTHER

## 2019-01-15 RX ORDER — OXYCODONE HYDROCHLORIDE AND ACETAMINOPHEN 5; 325 MG/1; MG/1
2 TABLET ORAL PRN
Status: DISCONTINUED | OUTPATIENT
Start: 2019-01-15 | End: 2019-01-15 | Stop reason: HOSPADM

## 2019-01-15 RX ORDER — MEPERIDINE HYDROCHLORIDE 50 MG/ML
12.5 INJECTION INTRAMUSCULAR; INTRAVENOUS; SUBCUTANEOUS EVERY 5 MIN PRN
Status: DISCONTINUED | OUTPATIENT
Start: 2019-01-15 | End: 2019-01-15 | Stop reason: HOSPADM

## 2019-01-15 RX ORDER — FENTANYL CITRATE 50 UG/ML
25 INJECTION, SOLUTION INTRAMUSCULAR; INTRAVENOUS EVERY 5 MIN PRN
Status: DISCONTINUED | OUTPATIENT
Start: 2019-01-15 | End: 2019-01-15 | Stop reason: HOSPADM

## 2019-01-15 RX ORDER — SODIUM CHLORIDE, SODIUM LACTATE, POTASSIUM CHLORIDE, CALCIUM CHLORIDE 600; 310; 30; 20 MG/100ML; MG/100ML; MG/100ML; MG/100ML
INJECTION, SOLUTION INTRAVENOUS CONTINUOUS
Status: DISCONTINUED | OUTPATIENT
Start: 2019-01-15 | End: 2019-01-15 | Stop reason: HOSPADM

## 2019-01-15 RX ORDER — OXYCODONE HYDROCHLORIDE AND ACETAMINOPHEN 5; 325 MG/1; MG/1
1 TABLET ORAL PRN
Status: DISCONTINUED | OUTPATIENT
Start: 2019-01-15 | End: 2019-01-15 | Stop reason: HOSPADM

## 2019-01-15 RX ORDER — LIDOCAINE HYDROCHLORIDE 10 MG/ML
INJECTION, SOLUTION EPIDURAL; INFILTRATION; INTRACAUDAL; PERINEURAL PRN
Status: DISCONTINUED | OUTPATIENT
Start: 2019-01-15 | End: 2019-01-15 | Stop reason: SDUPTHER

## 2019-01-15 RX ORDER — SULFAMETHOXAZOLE AND TRIMETHOPRIM 800; 160 MG/1; MG/1
1 TABLET ORAL 2 TIMES DAILY
Qty: 6 TABLET | Refills: 0 | Status: ON HOLD | OUTPATIENT
Start: 2019-01-15 | End: 2019-01-19 | Stop reason: HOSPADM

## 2019-01-15 RX ORDER — ONDANSETRON 2 MG/ML
4 INJECTION INTRAMUSCULAR; INTRAVENOUS
Status: DISCONTINUED | OUTPATIENT
Start: 2019-01-15 | End: 2019-01-15 | Stop reason: HOSPADM

## 2019-01-15 RX ORDER — ONDANSETRON 2 MG/ML
INJECTION INTRAMUSCULAR; INTRAVENOUS PRN
Status: DISCONTINUED | OUTPATIENT
Start: 2019-01-15 | End: 2019-01-15 | Stop reason: SDUPTHER

## 2019-01-15 RX ORDER — MIDAZOLAM HYDROCHLORIDE 1 MG/ML
INJECTION INTRAMUSCULAR; INTRAVENOUS PRN
Status: DISCONTINUED | OUTPATIENT
Start: 2019-01-15 | End: 2019-01-15 | Stop reason: SDUPTHER

## 2019-01-15 RX ORDER — IRBESARTAN 150 MG/1
150 TABLET ORAL DAILY
COMMUNITY
End: 2019-07-11

## 2019-01-15 RX ORDER — DIPHENHYDRAMINE HYDROCHLORIDE 50 MG/ML
12.5 INJECTION INTRAMUSCULAR; INTRAVENOUS
Status: DISCONTINUED | OUTPATIENT
Start: 2019-01-15 | End: 2019-01-15 | Stop reason: HOSPADM

## 2019-01-15 RX ADMIN — FENTANYL CITRATE 100 MCG: 50 INJECTION, SOLUTION INTRAMUSCULAR; INTRAVENOUS at 14:02

## 2019-01-15 RX ADMIN — SODIUM CHLORIDE, POTASSIUM CHLORIDE, SODIUM LACTATE AND CALCIUM CHLORIDE: 600; 310; 30; 20 INJECTION, SOLUTION INTRAVENOUS at 12:13

## 2019-01-15 RX ADMIN — LIDOCAINE HYDROCHLORIDE 50 MG: 10 INJECTION, SOLUTION EPIDURAL; INFILTRATION; INTRACAUDAL; PERINEURAL at 13:57

## 2019-01-15 RX ADMIN — PROPOFOL 50 MG: 10 INJECTION, EMULSION INTRAVENOUS at 13:58

## 2019-01-15 RX ADMIN — FENTANYL CITRATE 25 MCG: 50 INJECTION INTRAMUSCULAR; INTRAVENOUS at 15:17

## 2019-01-15 RX ADMIN — MIDAZOLAM 2 MG: 1 INJECTION INTRAMUSCULAR; INTRAVENOUS at 13:52

## 2019-01-15 RX ADMIN — ONDANSETRON 4 MG: 2 INJECTION INTRAMUSCULAR; INTRAVENOUS at 14:08

## 2019-01-15 RX ADMIN — SODIUM CHLORIDE, POTASSIUM CHLORIDE, SODIUM LACTATE AND CALCIUM CHLORIDE: 600; 310; 30; 20 INJECTION, SOLUTION INTRAVENOUS at 14:52

## 2019-01-15 RX ADMIN — PROPOFOL 150 MG: 10 INJECTION, EMULSION INTRAVENOUS at 13:57

## 2019-01-15 RX ADMIN — Medication 0.2 MG: at 14:06

## 2019-01-15 RX ADMIN — DEXAMETHASONE SODIUM PHOSPHATE 4 MG: 4 INJECTION, SOLUTION INTRAMUSCULAR; INTRAVENOUS at 14:08

## 2019-01-15 RX ADMIN — CIPROFLOXACIN 400 MG: 2 INJECTION, SOLUTION INTRAVENOUS at 14:00

## 2019-01-15 ASSESSMENT — PULMONARY FUNCTION TESTS
PIF_VALUE: 2
PIF_VALUE: 1
PIF_VALUE: 3
PIF_VALUE: 4
PIF_VALUE: 15
PIF_VALUE: 27
PIF_VALUE: 11
PIF_VALUE: 10
PIF_VALUE: 36
PIF_VALUE: 28
PIF_VALUE: 1
PIF_VALUE: 0
PIF_VALUE: 15
PIF_VALUE: 15
PIF_VALUE: 16
PIF_VALUE: 1
PIF_VALUE: 11
PIF_VALUE: 4
PIF_VALUE: 11
PIF_VALUE: 15
PIF_VALUE: 25
PIF_VALUE: 3
PIF_VALUE: 4
PIF_VALUE: 13
PIF_VALUE: 11
PIF_VALUE: 1
PIF_VALUE: 13
PIF_VALUE: 13
PIF_VALUE: 11
PIF_VALUE: 13
PIF_VALUE: 1
PIF_VALUE: 11
PIF_VALUE: 17
PIF_VALUE: 4
PIF_VALUE: 15
PIF_VALUE: 6
PIF_VALUE: 2
PIF_VALUE: 4
PIF_VALUE: 0
PIF_VALUE: 11
PIF_VALUE: 2
PIF_VALUE: 0
PIF_VALUE: 13
PIF_VALUE: 2
PIF_VALUE: 13
PIF_VALUE: 4
PIF_VALUE: 2
PIF_VALUE: 1
PIF_VALUE: 0
PIF_VALUE: 2
PIF_VALUE: 2
PIF_VALUE: 13
PIF_VALUE: 19
PIF_VALUE: 11
PIF_VALUE: 2

## 2019-01-15 ASSESSMENT — LIFESTYLE VARIABLES: SMOKING_STATUS: 0

## 2019-01-15 ASSESSMENT — PAIN SCALES - GENERAL
PAINLEVEL_OUTOF10: 3

## 2019-01-15 ASSESSMENT — ENCOUNTER SYMPTOMS: SHORTNESS OF BREATH: 0

## 2019-01-15 ASSESSMENT — PAIN DESCRIPTION - PAIN TYPE
TYPE: SURGICAL PAIN
TYPE: SURGICAL PAIN

## 2019-01-15 ASSESSMENT — PAIN - FUNCTIONAL ASSESSMENT: PAIN_FUNCTIONAL_ASSESSMENT: 0-10

## 2019-01-18 ENCOUNTER — APPOINTMENT (OUTPATIENT)
Dept: CT IMAGING | Facility: CLINIC | Age: 64
DRG: 694 | End: 2019-01-18
Payer: COMMERCIAL

## 2019-01-18 ENCOUNTER — HOSPITAL ENCOUNTER (INPATIENT)
Age: 64
LOS: 1 days | Discharge: HOME | DRG: 694 | End: 2019-01-19
Attending: EMERGENCY MEDICINE | Admitting: INTERNAL MEDICINE
Payer: COMMERCIAL

## 2019-01-18 DIAGNOSIS — N20.0 KIDNEY STONE: Primary | ICD-10-CM

## 2019-01-18 PROBLEM — N13.30 HYDRONEPHROSIS: Status: ACTIVE | Noted: 2019-01-18

## 2019-01-18 LAB
-: ABNORMAL
ABSOLUTE EOS #: 0.1 K/UL (ref 0–0.4)
ABSOLUTE IMMATURE GRANULOCYTE: ABNORMAL K/UL (ref 0–0.3)
ABSOLUTE LYMPH #: 0.6 K/UL (ref 1–4.8)
ABSOLUTE MONO #: 0.7 K/UL (ref 0.1–1.2)
AMORPHOUS: ABNORMAL
ANION GAP SERPL CALCULATED.3IONS-SCNC: 14 MMOL/L (ref 9–17)
BACTERIA: ABNORMAL
BASOPHILS # BLD: 0 % (ref 0–2)
BASOPHILS ABSOLUTE: 0 K/UL (ref 0–0.2)
BILIRUBIN URINE: ABNORMAL
BUN BLDV-MCNC: 19 MG/DL (ref 8–23)
BUN/CREAT BLD: ABNORMAL (ref 9–20)
CALCIUM SERPL-MCNC: 9.2 MG/DL (ref 8.6–10.4)
CASTS UA: ABNORMAL /LPF (ref 0–2)
CHLORIDE BLD-SCNC: 96 MMOL/L (ref 98–107)
CO2: 23 MMOL/L (ref 20–31)
COLOR: ABNORMAL
COMMENT UA: ABNORMAL
CREAT SERPL-MCNC: 0.8 MG/DL (ref 0.5–0.9)
CRYSTALS, UA: ABNORMAL /HPF
DIFFERENTIAL TYPE: ABNORMAL
EOSINOPHILS RELATIVE PERCENT: 1 % (ref 1–4)
EPITHELIAL CELLS UA: ABNORMAL /HPF (ref 0–5)
GFR AFRICAN AMERICAN: >60 ML/MIN
GFR NON-AFRICAN AMERICAN: >60 ML/MIN
GFR SERPL CREATININE-BSD FRML MDRD: ABNORMAL ML/MIN/{1.73_M2}
GFR SERPL CREATININE-BSD FRML MDRD: ABNORMAL ML/MIN/{1.73_M2}
GLUCOSE BLD-MCNC: 166 MG/DL (ref 70–99)
GLUCOSE URINE: NEGATIVE
HCT VFR BLD CALC: 44.6 % (ref 36–46)
HEMOGLOBIN: 15 G/DL (ref 12–16)
IMMATURE GRANULOCYTES: ABNORMAL %
KETONES, URINE: ABNORMAL
LEUKOCYTE ESTERASE, URINE: ABNORMAL
LYMPHOCYTES # BLD: 5 % (ref 24–44)
MCH RBC QN AUTO: 31.6 PG (ref 26–34)
MCHC RBC AUTO-ENTMCNC: 33.6 G/DL (ref 31–37)
MCV RBC AUTO: 94.2 FL (ref 80–100)
MONOCYTES # BLD: 6 % (ref 2–11)
MUCUS: ABNORMAL
NITRITE, URINE: NEGATIVE
NRBC AUTOMATED: ABNORMAL PER 100 WBC
OTHER OBSERVATIONS UA: ABNORMAL
PDW BLD-RTO: 12.5 % (ref 12.5–15.4)
PH UA: 6 (ref 5–8)
PLATELET # BLD: 213 K/UL (ref 140–450)
PLATELET ESTIMATE: ABNORMAL
PMV BLD AUTO: 7.9 FL (ref 6–12)
POTASSIUM SERPL-SCNC: 3.5 MMOL/L (ref 3.7–5.3)
PROTEIN UA: ABNORMAL
RBC # BLD: 4.73 M/UL (ref 4–5.2)
RBC # BLD: ABNORMAL 10*6/UL
RBC UA: ABNORMAL /HPF (ref 0–2)
RENAL EPITHELIAL, UA: ABNORMAL /HPF
SEG NEUTROPHILS: 88 % (ref 36–66)
SEGMENTED NEUTROPHILS ABSOLUTE COUNT: 10.3 K/UL (ref 1.8–7.7)
SODIUM BLD-SCNC: 133 MMOL/L (ref 135–144)
SPECIFIC GRAVITY UA: 1.02 (ref 1–1.03)
TRICHOMONAS: ABNORMAL
TURBIDITY: ABNORMAL
URINE HGB: ABNORMAL
UROBILINOGEN, URINE: ABNORMAL
WBC # BLD: 11.8 K/UL (ref 3.5–11)
WBC # BLD: ABNORMAL 10*3/UL
WBC UA: ABNORMAL /HPF (ref 0–5)
YEAST: ABNORMAL

## 2019-01-18 PROCEDURE — 74176 CT ABD & PELVIS W/O CONTRAST: CPT

## 2019-01-18 PROCEDURE — 80048 BASIC METABOLIC PNL TOTAL CA: CPT

## 2019-01-18 PROCEDURE — 36415 COLL VENOUS BLD VENIPUNCTURE: CPT

## 2019-01-18 PROCEDURE — 85025 COMPLETE CBC W/AUTO DIFF WBC: CPT

## 2019-01-18 PROCEDURE — 96375 TX/PRO/DX INJ NEW DRUG ADDON: CPT

## 2019-01-18 PROCEDURE — 99285 EMERGENCY DEPT VISIT HI MDM: CPT

## 2019-01-18 PROCEDURE — 2580000003 HC RX 258: Performed by: EMERGENCY MEDICINE

## 2019-01-18 PROCEDURE — 6360000002 HC RX W HCPCS: Performed by: EMERGENCY MEDICINE

## 2019-01-18 PROCEDURE — 96374 THER/PROPH/DIAG INJ IV PUSH: CPT

## 2019-01-18 PROCEDURE — 81001 URINALYSIS AUTO W/SCOPE: CPT

## 2019-01-18 PROCEDURE — 6370000000 HC RX 637 (ALT 250 FOR IP): Performed by: INTERNAL MEDICINE

## 2019-01-18 PROCEDURE — 1200000000 HC SEMI PRIVATE

## 2019-01-18 PROCEDURE — 87086 URINE CULTURE/COLONY COUNT: CPT

## 2019-01-18 RX ORDER — ONDANSETRON 2 MG/ML
4 INJECTION INTRAMUSCULAR; INTRAVENOUS ONCE
Status: COMPLETED | OUTPATIENT
Start: 2019-01-18 | End: 2019-01-18

## 2019-01-18 RX ORDER — HYDROCODONE BITARTRATE AND ACETAMINOPHEN 5; 325 MG/1; MG/1
1 TABLET ORAL EVERY 6 HOURS PRN
Status: DISCONTINUED | OUTPATIENT
Start: 2019-01-18 | End: 2019-01-19 | Stop reason: HOSPADM

## 2019-01-18 RX ORDER — CIPROFLOXACIN 500 MG/1
250 TABLET, FILM COATED ORAL EVERY 12 HOURS SCHEDULED
Status: DISCONTINUED | OUTPATIENT
Start: 2019-01-19 | End: 2019-01-19 | Stop reason: HOSPADM

## 2019-01-18 RX ORDER — ASPIRIN 81 MG/1
81 TABLET ORAL DAILY
Status: DISCONTINUED | OUTPATIENT
Start: 2019-01-18 | End: 2019-01-19 | Stop reason: HOSPADM

## 2019-01-18 RX ORDER — HYDROCODONE BITARTRATE AND ACETAMINOPHEN 5; 325 MG/1; MG/1
1 TABLET ORAL EVERY 6 HOURS PRN
Status: DISCONTINUED | OUTPATIENT
Start: 2019-01-18 | End: 2019-01-18

## 2019-01-18 RX ORDER — MORPHINE SULFATE 2 MG/ML
2 INJECTION, SOLUTION INTRAMUSCULAR; INTRAVENOUS ONCE
Status: COMPLETED | OUTPATIENT
Start: 2019-01-18 | End: 2019-01-18

## 2019-01-18 RX ORDER — LOSARTAN POTASSIUM 50 MG/1
50 TABLET ORAL DAILY
Status: DISCONTINUED | OUTPATIENT
Start: 2019-01-18 | End: 2019-01-19 | Stop reason: HOSPADM

## 2019-01-18 RX ORDER — ONDANSETRON 2 MG/ML
4 INJECTION INTRAMUSCULAR; INTRAVENOUS EVERY 6 HOURS PRN
Status: DISCONTINUED | OUTPATIENT
Start: 2019-01-18 | End: 2019-01-18

## 2019-01-18 RX ORDER — CITALOPRAM 20 MG/1
20 TABLET ORAL DAILY
Status: DISCONTINUED | OUTPATIENT
Start: 2019-01-18 | End: 2019-01-19 | Stop reason: HOSPADM

## 2019-01-18 RX ORDER — ONDANSETRON 4 MG/1
4 TABLET, ORALLY DISINTEGRATING ORAL EVERY 6 HOURS PRN
Status: DISCONTINUED | OUTPATIENT
Start: 2019-01-18 | End: 2019-01-19 | Stop reason: HOSPADM

## 2019-01-18 RX ORDER — SODIUM CHLORIDE 0.9 % (FLUSH) 0.9 %
10 SYRINGE (ML) INJECTION PRN
Status: DISCONTINUED | OUTPATIENT
Start: 2019-01-18 | End: 2019-01-19 | Stop reason: HOSPADM

## 2019-01-18 RX ORDER — SODIUM CHLORIDE 9 MG/ML
INJECTION, SOLUTION INTRAVENOUS CONTINUOUS
Status: DISCONTINUED | OUTPATIENT
Start: 2019-01-18 | End: 2019-01-19 | Stop reason: HOSPADM

## 2019-01-18 RX ORDER — KETOROLAC TROMETHAMINE 15 MG/ML
15 INJECTION, SOLUTION INTRAMUSCULAR; INTRAVENOUS ONCE
Status: COMPLETED | OUTPATIENT
Start: 2019-01-18 | End: 2019-01-18

## 2019-01-18 RX ORDER — LEVOFLOXACIN 5 MG/ML
500 INJECTION, SOLUTION INTRAVENOUS ONCE
Status: COMPLETED | OUTPATIENT
Start: 2019-01-18 | End: 2019-01-18

## 2019-01-18 RX ORDER — SODIUM CHLORIDE 0.9 % (FLUSH) 0.9 %
10 SYRINGE (ML) INJECTION EVERY 12 HOURS SCHEDULED
Status: DISCONTINUED | OUTPATIENT
Start: 2019-01-18 | End: 2019-01-19 | Stop reason: HOSPADM

## 2019-01-18 RX ORDER — ONDANSETRON 2 MG/ML
4 INJECTION INTRAMUSCULAR; INTRAVENOUS EVERY 6 HOURS PRN
Status: DISCONTINUED | OUTPATIENT
Start: 2019-01-18 | End: 2019-01-19 | Stop reason: HOSPADM

## 2019-01-18 RX ORDER — LEVOTHYROXINE SODIUM 0.05 MG/1
50 TABLET ORAL DAILY
Status: DISCONTINUED | OUTPATIENT
Start: 2019-01-19 | End: 2019-01-19 | Stop reason: HOSPADM

## 2019-01-18 RX ADMIN — ONDANSETRON 4 MG: 2 INJECTION INTRAMUSCULAR; INTRAVENOUS at 14:34

## 2019-01-18 RX ADMIN — ASPIRIN 81 MG: 81 TABLET, COATED ORAL at 20:50

## 2019-01-18 RX ADMIN — KETOROLAC TROMETHAMINE 15 MG: 15 INJECTION, SOLUTION INTRAMUSCULAR; INTRAVENOUS at 14:36

## 2019-01-18 RX ADMIN — MORPHINE SULFATE 2 MG: 2 INJECTION, SOLUTION INTRAMUSCULAR; INTRAVENOUS at 14:39

## 2019-01-18 RX ADMIN — SODIUM CHLORIDE: 9 INJECTION, SOLUTION INTRAVENOUS at 16:24

## 2019-01-18 RX ADMIN — LEVOFLOXACIN 500 MG: 5 INJECTION, SOLUTION INTRAVENOUS at 16:25

## 2019-01-18 RX ADMIN — MORPHINE SULFATE 2 MG: 2 INJECTION, SOLUTION INTRAMUSCULAR; INTRAVENOUS at 16:43

## 2019-01-18 ASSESSMENT — PAIN DESCRIPTION - FREQUENCY
FREQUENCY: CONTINUOUS

## 2019-01-18 ASSESSMENT — PAIN SCALES - GENERAL
PAINLEVEL_OUTOF10: 3
PAINLEVEL_OUTOF10: 3
PAINLEVEL_OUTOF10: 0
PAINLEVEL_OUTOF10: 0
PAINLEVEL_OUTOF10: 1
PAINLEVEL_OUTOF10: 0
PAINLEVEL_OUTOF10: 7

## 2019-01-18 ASSESSMENT — PAIN DESCRIPTION - PAIN TYPE
TYPE: ACUTE PAIN

## 2019-01-18 ASSESSMENT — PAIN DESCRIPTION - DESCRIPTORS
DESCRIPTORS: ACHING;THROBBING
DESCRIPTORS: ACHING;THROBBING
DESCRIPTORS: ACHING

## 2019-01-18 ASSESSMENT — PAIN DESCRIPTION - ORIENTATION
ORIENTATION: LEFT

## 2019-01-18 ASSESSMENT — PAIN DESCRIPTION - LOCATION
LOCATION: FLANK

## 2019-01-19 ENCOUNTER — APPOINTMENT (OUTPATIENT)
Dept: GENERAL RADIOLOGY | Age: 64
DRG: 694 | End: 2019-01-19
Payer: COMMERCIAL

## 2019-01-19 VITALS
RESPIRATION RATE: 16 BRPM | SYSTOLIC BLOOD PRESSURE: 124 MMHG | BODY MASS INDEX: 31.47 KG/M2 | HEART RATE: 75 BPM | OXYGEN SATURATION: 95 % | HEIGHT: 62 IN | TEMPERATURE: 98.6 F | DIASTOLIC BLOOD PRESSURE: 68 MMHG | WEIGHT: 171 LBS

## 2019-01-19 PROBLEM — N20.1 LEFT URETERAL STONE: Status: ACTIVE | Noted: 2019-01-19

## 2019-01-19 LAB
-: ABNORMAL
ABSOLUTE EOS #: 0.2 K/UL (ref 0–0.4)
ABSOLUTE IMMATURE GRANULOCYTE: ABNORMAL K/UL (ref 0–0.3)
ABSOLUTE LYMPH #: 1 K/UL (ref 1–4.8)
ABSOLUTE MONO #: 0.6 K/UL (ref 0.2–0.8)
ALBUMIN SERPL-MCNC: 3.3 G/DL (ref 3.5–5.2)
ALBUMIN/GLOBULIN RATIO: ABNORMAL (ref 1–2.5)
ALP BLD-CCNC: 68 U/L (ref 35–104)
ALT SERPL-CCNC: 11 U/L (ref 5–33)
AMORPHOUS: ABNORMAL
ANION GAP SERPL CALCULATED.3IONS-SCNC: 10 MMOL/L (ref 9–17)
AST SERPL-CCNC: 12 U/L
BACTERIA: ABNORMAL
BASOPHILS # BLD: 0 % (ref 0–2)
BASOPHILS ABSOLUTE: 0 K/UL (ref 0–0.2)
BILIRUB SERPL-MCNC: 0.73 MG/DL (ref 0.3–1.2)
BILIRUBIN URINE: NEGATIVE
BUN BLDV-MCNC: 14 MG/DL (ref 8–23)
BUN/CREAT BLD: 19 (ref 9–20)
CALCIUM SERPL-MCNC: 8.9 MG/DL (ref 8.6–10.4)
CASTS UA: ABNORMAL /LPF
CHLORIDE BLD-SCNC: 103 MMOL/L (ref 98–107)
CO2: 26 MMOL/L (ref 20–31)
COLOR: ABNORMAL
COMMENT UA: ABNORMAL
CREAT SERPL-MCNC: 0.73 MG/DL (ref 0.5–0.9)
CRYSTALS, UA: ABNORMAL /HPF
CULTURE: NO GROWTH
DIFFERENTIAL TYPE: ABNORMAL
EOSINOPHILS RELATIVE PERCENT: 4 % (ref 1–4)
EPITHELIAL CELLS UA: ABNORMAL /HPF (ref 0–5)
GFR AFRICAN AMERICAN: >60 ML/MIN
GFR NON-AFRICAN AMERICAN: >60 ML/MIN
GFR SERPL CREATININE-BSD FRML MDRD: ABNORMAL ML/MIN/{1.73_M2}
GFR SERPL CREATININE-BSD FRML MDRD: ABNORMAL ML/MIN/{1.73_M2}
GLUCOSE BLD-MCNC: 134 MG/DL (ref 70–99)
GLUCOSE URINE: NEGATIVE
HCT VFR BLD CALC: 39.2 % (ref 36–46)
HEMOGLOBIN: 13.6 G/DL (ref 12–16)
IMMATURE GRANULOCYTES: ABNORMAL %
KETONES, URINE: NEGATIVE
LEUKOCYTE ESTERASE, URINE: ABNORMAL
LYMPHOCYTES # BLD: 21 % (ref 24–44)
Lab: NORMAL
MCH RBC QN AUTO: 32 PG (ref 26–34)
MCHC RBC AUTO-ENTMCNC: 34.6 G/DL (ref 31–37)
MCV RBC AUTO: 92.5 FL (ref 80–100)
MONOCYTES # BLD: 13 % (ref 1–7)
MUCUS: ABNORMAL
NITRITE, URINE: NEGATIVE
NRBC AUTOMATED: ABNORMAL PER 100 WBC
OTHER OBSERVATIONS UA: ABNORMAL
PDW BLD-RTO: 12.7 % (ref 11.5–14.5)
PH UA: 5.5 (ref 5–8)
PLATELET # BLD: 205 K/UL (ref 130–400)
PLATELET ESTIMATE: ABNORMAL
PMV BLD AUTO: 7.4 FL (ref 6–12)
POTASSIUM SERPL-SCNC: 3.7 MMOL/L (ref 3.7–5.3)
PROTEIN UA: ABNORMAL
RBC # BLD: 4.24 M/UL (ref 4–5.2)
RBC # BLD: ABNORMAL 10*6/UL
RBC UA: ABNORMAL /HPF (ref 0–2)
RENAL EPITHELIAL, UA: ABNORMAL /HPF
SEG NEUTROPHILS: 62 % (ref 36–66)
SEGMENTED NEUTROPHILS ABSOLUTE COUNT: 3 K/UL (ref 1.8–7.7)
SODIUM BLD-SCNC: 139 MMOL/L (ref 135–144)
SPECIFIC GRAVITY UA: 1.01 (ref 1–1.03)
SPECIMEN DESCRIPTION: NORMAL
STATUS: NORMAL
TOTAL PROTEIN: 6 G/DL (ref 6.4–8.3)
TRICHOMONAS: ABNORMAL
TURBIDITY: ABNORMAL
URINE HGB: ABNORMAL
UROBILINOGEN, URINE: NORMAL
WBC # BLD: 4.8 K/UL (ref 3.5–11)
WBC # BLD: ABNORMAL 10*3/UL
WBC UA: ABNORMAL /HPF (ref 0–5)
YEAST: ABNORMAL

## 2019-01-19 PROCEDURE — 80053 COMPREHEN METABOLIC PANEL: CPT

## 2019-01-19 PROCEDURE — 74018 RADEX ABDOMEN 1 VIEW: CPT

## 2019-01-19 PROCEDURE — 6370000000 HC RX 637 (ALT 250 FOR IP): Performed by: INTERNAL MEDICINE

## 2019-01-19 PROCEDURE — 36415 COLL VENOUS BLD VENIPUNCTURE: CPT

## 2019-01-19 PROCEDURE — 85025 COMPLETE CBC W/AUTO DIFF WBC: CPT

## 2019-01-19 RX ORDER — CIPROFLOXACIN 250 MG/1
250 TABLET, FILM COATED ORAL EVERY 12 HOURS SCHEDULED
Qty: 14 TABLET | Refills: 0
Start: 2019-01-19 | End: 2019-01-26

## 2019-01-19 RX ORDER — KETOROLAC TROMETHAMINE 15 MG/ML
10 INJECTION, SOLUTION INTRAMUSCULAR; INTRAVENOUS EVERY 6 HOURS PRN
Status: DISCONTINUED | OUTPATIENT
Start: 2019-01-19 | End: 2019-01-19 | Stop reason: HOSPADM

## 2019-01-19 RX ADMIN — CITALOPRAM HYDROBROMIDE 20 MG: 20 TABLET ORAL at 09:06

## 2019-01-19 RX ADMIN — HYDROCODONE BITARTRATE AND ACETAMINOPHEN 1 TABLET: 5; 325 TABLET ORAL at 16:19

## 2019-01-19 RX ADMIN — LEVOTHYROXINE SODIUM 50 MCG: 50 TABLET ORAL at 06:05

## 2019-01-19 RX ADMIN — CIPROFLOXACIN 250 MG: 500 TABLET, FILM COATED ORAL at 09:06

## 2019-01-19 RX ADMIN — ASPIRIN 81 MG: 81 TABLET, COATED ORAL at 09:06

## 2019-01-19 RX ADMIN — HYDROCODONE BITARTRATE AND ACETAMINOPHEN 1 TABLET: 5; 325 TABLET ORAL at 08:07

## 2019-01-19 RX ADMIN — LOSARTAN POTASSIUM 50 MG: 50 TABLET, FILM COATED ORAL at 09:06

## 2019-01-19 ASSESSMENT — PAIN SCALES - GENERAL
PAINLEVEL_OUTOF10: 4
PAINLEVEL_OUTOF10: 5
PAINLEVEL_OUTOF10: 2

## 2019-01-20 LAB
CULTURE: NO GROWTH
Lab: NORMAL
SPECIMEN DESCRIPTION: NORMAL
STATUS: NORMAL

## 2019-01-30 ENCOUNTER — HOSPITAL ENCOUNTER (OUTPATIENT)
Age: 64
Setting detail: SPECIMEN
Discharge: HOME | End: 2019-01-30
Payer: COMMERCIAL

## 2019-02-03 LAB
STONE COMPOSITION: NORMAL
STONE DESCRIPTION: NORMAL
STONE MASS: 17 MG
STONE NUMBER: NORMAL
STONE SIZE: NORMAL MM

## 2019-02-21 ENCOUNTER — OFFICE VISIT (OUTPATIENT)
Dept: ORTHOPEDIC SURGERY | Age: 64
End: 2019-02-21
Payer: COMMERCIAL

## 2019-02-21 DIAGNOSIS — M21.371 RIGHT FOOT DROP: Primary | ICD-10-CM

## 2019-02-21 DIAGNOSIS — G57.31 RIGHT PERONEAL NERVE PALSY: ICD-10-CM

## 2019-02-21 PROCEDURE — 99213 OFFICE O/P EST LOW 20 MIN: CPT | Performed by: ORTHOPAEDIC SURGERY

## 2019-04-23 ENCOUNTER — HOSPITAL ENCOUNTER (OUTPATIENT)
Facility: CLINIC | Age: 64
Discharge: HOME | End: 2019-04-23
Payer: COMMERCIAL

## 2019-04-23 PROCEDURE — 87086 URINE CULTURE/COLONY COUNT: CPT

## 2019-04-25 LAB
CULTURE: NORMAL
Lab: NORMAL
SPECIMEN DESCRIPTION: NORMAL

## 2019-05-17 ENCOUNTER — HOSPITAL ENCOUNTER (OUTPATIENT)
Dept: MAMMOGRAPHY | Age: 64
Discharge: HOME | End: 2019-05-19
Payer: COMMERCIAL

## 2019-05-17 DIAGNOSIS — Z12.39 BREAST CANCER SCREENING: ICD-10-CM

## 2019-05-17 PROCEDURE — 77063 BREAST TOMOSYNTHESIS BI: CPT

## 2019-07-08 ENCOUNTER — HOSPITAL ENCOUNTER (OUTPATIENT)
Facility: CLINIC | Age: 64
Discharge: HOME | End: 2019-07-08
Payer: COMMERCIAL

## 2019-07-08 DIAGNOSIS — N20.0 KIDNEY STONES: ICD-10-CM

## 2019-07-08 DIAGNOSIS — N18.2 BENIGN HYPERTENSION WITH CKD (CHRONIC KIDNEY DISEASE), STAGE II: ICD-10-CM

## 2019-07-08 DIAGNOSIS — N20.1 LEFT URETERAL STONE: ICD-10-CM

## 2019-07-08 DIAGNOSIS — N18.2 CKD (CHRONIC KIDNEY DISEASE) STAGE 2, GFR 60-89 ML/MIN: ICD-10-CM

## 2019-07-08 DIAGNOSIS — I12.9 BENIGN HYPERTENSION WITH CKD (CHRONIC KIDNEY DISEASE), STAGE II: ICD-10-CM

## 2019-07-08 DIAGNOSIS — N13.30 HYDRONEPHROSIS, UNSPECIFIED HYDRONEPHROSIS TYPE: ICD-10-CM

## 2019-07-08 LAB
ABSOLUTE EOS #: 0.24 K/UL (ref 0–0.44)
ABSOLUTE IMMATURE GRANULOCYTE: <0.03 K/UL (ref 0–0.3)
ABSOLUTE LYMPH #: 2.01 K/UL (ref 1.1–3.7)
ABSOLUTE MONO #: 0.47 K/UL (ref 0.1–1.2)
BASOPHILS # BLD: 0 % (ref 0–2)
BASOPHILS ABSOLUTE: 0.03 K/UL (ref 0–0.2)
DIFFERENTIAL TYPE: NORMAL
EOSINOPHILS RELATIVE PERCENT: 3 % (ref 1–4)
HCT VFR BLD CALC: 44.6 % (ref 36.3–47.1)
HEMOGLOBIN: 14.5 G/DL (ref 11.9–15.1)
IMMATURE GRANULOCYTES: 0 %
LYMPHOCYTES # BLD: 27 % (ref 24–43)
MCH RBC QN AUTO: 31.6 PG (ref 25.2–33.5)
MCHC RBC AUTO-ENTMCNC: 32.5 G/DL (ref 28.4–34.8)
MCV RBC AUTO: 97.2 FL (ref 82.6–102.9)
MONOCYTES # BLD: 6 % (ref 3–12)
NRBC AUTOMATED: 0 PER 100 WBC
PDW BLD-RTO: 11.9 % (ref 11.8–14.4)
PLATELET # BLD: 266 K/UL (ref 138–453)
PLATELET ESTIMATE: NORMAL
PMV BLD AUTO: 10.8 FL (ref 8.1–13.5)
RBC # BLD: 4.59 M/UL (ref 3.95–5.11)
RBC # BLD: NORMAL 10*6/UL
SEG NEUTROPHILS: 64 % (ref 36–65)
SEGMENTED NEUTROPHILS ABSOLUTE COUNT: 4.8 K/UL (ref 1.5–8.1)
WBC # BLD: 7.6 K/UL (ref 3.5–11.3)
WBC # BLD: NORMAL 10*3/UL

## 2019-07-08 PROCEDURE — 85025 COMPLETE CBC W/AUTO DIFF WBC: CPT

## 2019-07-08 PROCEDURE — 36415 COLL VENOUS BLD VENIPUNCTURE: CPT

## 2019-07-08 PROCEDURE — 80053 COMPREHEN METABOLIC PANEL: CPT

## 2019-07-08 PROCEDURE — 83735 ASSAY OF MAGNESIUM: CPT

## 2019-07-08 PROCEDURE — 84100 ASSAY OF PHOSPHORUS: CPT

## 2019-07-09 LAB
ALBUMIN SERPL-MCNC: 4.1 G/DL (ref 3.5–5.2)
ALBUMIN/GLOBULIN RATIO: 1.3 (ref 1–2.5)
ALP BLD-CCNC: 76 U/L (ref 35–104)
ALT SERPL-CCNC: 11 U/L (ref 5–33)
ANION GAP SERPL CALCULATED.3IONS-SCNC: 9 MMOL/L (ref 9–17)
AST SERPL-CCNC: 14 U/L
BILIRUB SERPL-MCNC: 0.5 MG/DL (ref 0.3–1.2)
BUN BLDV-MCNC: 22 MG/DL (ref 8–23)
BUN/CREAT BLD: ABNORMAL (ref 9–20)
CALCIUM SERPL-MCNC: 9.5 MG/DL (ref 8.6–10.4)
CHLORIDE BLD-SCNC: 106 MMOL/L (ref 98–107)
CO2: 28 MMOL/L (ref 20–31)
CREAT SERPL-MCNC: 0.67 MG/DL (ref 0.5–0.9)
GFR AFRICAN AMERICAN: >60 ML/MIN
GFR NON-AFRICAN AMERICAN: >60 ML/MIN
GFR SERPL CREATININE-BSD FRML MDRD: ABNORMAL ML/MIN/{1.73_M2}
GFR SERPL CREATININE-BSD FRML MDRD: ABNORMAL ML/MIN/{1.73_M2}
GLUCOSE BLD-MCNC: 102 MG/DL (ref 70–99)
MAGNESIUM: 2.1 MG/DL (ref 1.6–2.6)
PHOSPHORUS: 3 MG/DL (ref 2.6–4.5)
POTASSIUM SERPL-SCNC: 4.6 MMOL/L (ref 3.7–5.3)
SODIUM BLD-SCNC: 143 MMOL/L (ref 135–144)
TOTAL PROTEIN: 7.2 G/DL (ref 6.4–8.3)

## 2019-07-11 PROBLEM — E78.00 HYPERCHOLESTEROLEMIA: Status: ACTIVE | Noted: 2018-09-06

## 2019-07-11 PROBLEM — F32.A DEPRESSIVE DISORDER: Status: ACTIVE | Noted: 2018-09-06

## 2019-07-11 PROBLEM — I10 HYPERTENSIVE DISORDER: Status: ACTIVE | Noted: 2018-09-06

## 2019-07-11 PROBLEM — N18.1 CKD (CHRONIC KIDNEY DISEASE) STAGE 1, GFR 90 ML/MIN OR GREATER: Status: ACTIVE | Noted: 2019-07-11

## 2019-07-11 PROBLEM — E03.9 HYPOTHYROIDISM: Status: ACTIVE | Noted: 2018-09-06

## 2019-09-02 ENCOUNTER — HOSPITAL ENCOUNTER (OUTPATIENT)
Facility: CLINIC | Age: 64
Discharge: HOME | End: 2019-09-02
Payer: COMMERCIAL

## 2019-09-02 DIAGNOSIS — N13.30 HYDRONEPHROSIS, UNSPECIFIED HYDRONEPHROSIS TYPE: ICD-10-CM

## 2019-09-02 DIAGNOSIS — I12.9 BENIGN HYPERTENSION WITH CKD (CHRONIC KIDNEY DISEASE) STAGE I: ICD-10-CM

## 2019-09-02 DIAGNOSIS — N20.0 KIDNEY STONES: ICD-10-CM

## 2019-09-02 DIAGNOSIS — N18.1 CKD (CHRONIC KIDNEY DISEASE) STAGE 1, GFR 90 ML/MIN OR GREATER: ICD-10-CM

## 2019-09-02 DIAGNOSIS — N18.1 CKD (CHRONIC KIDNEY DISEASE), SYMPTOM MANAGEMENT ONLY, STAGE 1: ICD-10-CM

## 2019-09-02 DIAGNOSIS — N20.1 LEFT URETERAL STONE: ICD-10-CM

## 2019-09-02 DIAGNOSIS — N18.1 BENIGN HYPERTENSION WITH CKD (CHRONIC KIDNEY DISEASE) STAGE I: ICD-10-CM

## 2019-09-02 LAB
ABSOLUTE EOS #: 0.21 K/UL (ref 0–0.44)
ABSOLUTE IMMATURE GRANULOCYTE: <0.03 K/UL (ref 0–0.3)
ABSOLUTE LYMPH #: 1.51 K/UL (ref 1.1–3.7)
ABSOLUTE MONO #: 0.26 K/UL (ref 0.1–1.2)
ALBUMIN SERPL-MCNC: 3.8 G/DL (ref 3.5–5.2)
ALBUMIN/GLOBULIN RATIO: 1.2 (ref 1–2.5)
ALP BLD-CCNC: 79 U/L (ref 35–104)
ALT SERPL-CCNC: 10 U/L (ref 5–33)
ANION GAP SERPL CALCULATED.3IONS-SCNC: 14 MMOL/L (ref 9–17)
AST SERPL-CCNC: 13 U/L
BASOPHILS # BLD: 0 % (ref 0–2)
BASOPHILS ABSOLUTE: 0.03 K/UL (ref 0–0.2)
BILIRUB SERPL-MCNC: 0.52 MG/DL (ref 0.3–1.2)
BUN BLDV-MCNC: 19 MG/DL (ref 8–23)
BUN/CREAT BLD: ABNORMAL (ref 9–20)
CALCIUM SERPL-MCNC: 9.2 MG/DL (ref 8.6–10.4)
CHLORIDE BLD-SCNC: 103 MMOL/L (ref 98–107)
CO2: 23 MMOL/L (ref 20–31)
COMPLEMENT C3: 145 MG/DL (ref 90–180)
COMPLEMENT C4: 31 MG/DL (ref 10–40)
CREAT SERPL-MCNC: 0.61 MG/DL (ref 0.5–0.9)
CREATININE URINE: 182.8 MG/DL (ref 28–217)
DIFFERENTIAL TYPE: ABNORMAL
EOSINOPHILS RELATIVE PERCENT: 3 % (ref 1–4)
FREE KAPPA/LAMBDA RATIO: 0.92 (ref 0.26–1.65)
GFR AFRICAN AMERICAN: >60 ML/MIN
GFR NON-AFRICAN AMERICAN: >60 ML/MIN
GFR SERPL CREATININE-BSD FRML MDRD: ABNORMAL ML/MIN/{1.73_M2}
GFR SERPL CREATININE-BSD FRML MDRD: ABNORMAL ML/MIN/{1.73_M2}
GLUCOSE BLD-MCNC: 178 MG/DL (ref 70–99)
HCT VFR BLD CALC: 43.7 % (ref 36.3–47.1)
HEMOGLOBIN: 14.4 G/DL (ref 11.9–15.1)
IMMATURE GRANULOCYTES: 0 %
KAPPA FREE LIGHT CHAINS QNT: 1.61 MG/DL (ref 0.37–1.94)
LAMBDA FREE LIGHT CHAINS QNT: 1.75 MG/DL (ref 0.57–2.63)
LYMPHOCYTES # BLD: 22 % (ref 24–43)
MAGNESIUM: 2 MG/DL (ref 1.6–2.6)
MCH RBC QN AUTO: 32.3 PG (ref 25.2–33.5)
MCHC RBC AUTO-ENTMCNC: 33 G/DL (ref 28.4–34.8)
MCV RBC AUTO: 98 FL (ref 82.6–102.9)
MONOCYTES # BLD: 4 % (ref 3–12)
NRBC AUTOMATED: 0 PER 100 WBC
PDW BLD-RTO: 12 % (ref 11.8–14.4)
PHOSPHORUS: 2.4 MG/DL (ref 2.6–4.5)
PLATELET # BLD: 246 K/UL (ref 138–453)
PLATELET ESTIMATE: ABNORMAL
PMV BLD AUTO: 9.9 FL (ref 8.1–13.5)
POTASSIUM SERPL-SCNC: 4.1 MMOL/L (ref 3.7–5.3)
PTH INTACT: 29.2 PG/ML (ref 15–65)
RBC # BLD: 4.46 M/UL (ref 3.95–5.11)
RBC # BLD: ABNORMAL 10*6/UL
SEG NEUTROPHILS: 71 % (ref 36–65)
SEGMENTED NEUTROPHILS ABSOLUTE COUNT: 4.81 K/UL (ref 1.5–8.1)
SODIUM BLD-SCNC: 140 MMOL/L (ref 135–144)
TOTAL PROTEIN, URINE: 22 MG/DL
TOTAL PROTEIN: 7.1 G/DL (ref 6.4–8.3)
URIC ACID: 3.7 MG/DL (ref 2.4–5.7)
URINE TOTAL PROTEIN CREATININE RATIO: 0.12 (ref 0–0.2)
VITAMIN D 25-HYDROXY: 29.6 NG/ML (ref 30–100)
WBC # BLD: 6.8 K/UL (ref 3.5–11.3)
WBC # BLD: ABNORMAL 10*3/UL

## 2019-09-02 PROCEDURE — 84156 ASSAY OF PROTEIN URINE: CPT

## 2019-09-02 PROCEDURE — 83970 ASSAY OF PARATHORMONE: CPT

## 2019-09-02 PROCEDURE — 84165 PROTEIN E-PHORESIS SERUM: CPT

## 2019-09-02 PROCEDURE — 84100 ASSAY OF PHOSPHORUS: CPT

## 2019-09-02 PROCEDURE — 86038 ANTINUCLEAR ANTIBODIES: CPT

## 2019-09-02 PROCEDURE — 86160 COMPLEMENT ANTIGEN: CPT

## 2019-09-02 PROCEDURE — 84550 ASSAY OF BLOOD/URIC ACID: CPT

## 2019-09-02 PROCEDURE — 84155 ASSAY OF PROTEIN SERUM: CPT

## 2019-09-02 PROCEDURE — 82570 ASSAY OF URINE CREATININE: CPT

## 2019-09-02 PROCEDURE — 83735 ASSAY OF MAGNESIUM: CPT

## 2019-09-02 PROCEDURE — 82306 VITAMIN D 25 HYDROXY: CPT

## 2019-09-02 PROCEDURE — 36415 COLL VENOUS BLD VENIPUNCTURE: CPT

## 2019-09-02 PROCEDURE — 80053 COMPREHEN METABOLIC PANEL: CPT

## 2019-09-02 PROCEDURE — 85025 COMPLETE CBC W/AUTO DIFF WBC: CPT

## 2019-09-02 PROCEDURE — 83883 ASSAY NEPHELOMETRY NOT SPEC: CPT

## 2019-09-03 LAB
ALBUMIN (CALCULATED): 4.1 G/DL (ref 3.2–5.2)
ALBUMIN PERCENT: 60 % (ref 45–65)
ALPHA 1 PERCENT: 2 % (ref 3–6)
ALPHA 2 PERCENT: 9 % (ref 6–13)
ALPHA-1-GLOBULIN: 0.1 G/DL (ref 0.1–0.4)
ALPHA-2-GLOBULIN: 0.6 G/DL (ref 0.5–0.9)
ANTI-NUCLEAR ANTIBODY (ANA): NEGATIVE
BETA GLOBULIN: 0.8 G/DL (ref 0.5–1.1)
BETA PERCENT: 12 % (ref 11–19)
GAMMA GLOBULIN %: 16 % (ref 9–20)
GAMMA GLOBULIN: 1.1 G/DL (ref 0.5–1.5)
PATHOLOGIST: ABNORMAL
PROTEIN ELECTROPHORESIS, SERUM: ABNORMAL
TOTAL PROT. SUM,%: 99 % (ref 98–102)
TOTAL PROT. SUM: 6.7 G/DL (ref 6.3–8.2)
TOTAL PROTEIN: 6.7 G/DL (ref 6.4–8.3)

## 2019-10-28 ENCOUNTER — HOSPITAL ENCOUNTER (OUTPATIENT)
Facility: CLINIC | Age: 64
Discharge: HOME | End: 2019-10-28
Payer: MEDICARE

## 2019-10-28 PROCEDURE — 36415 COLL VENOUS BLD VENIPUNCTURE: CPT

## 2019-10-28 PROCEDURE — 83036 HEMOGLOBIN GLYCOSYLATED A1C: CPT

## 2019-10-29 LAB
ESTIMATED AVERAGE GLUCOSE: 117 MG/DL
HBA1C MFR BLD: 5.7 % (ref 4–6)

## 2019-11-21 ENCOUNTER — HOSPITAL ENCOUNTER (OUTPATIENT)
Facility: CLINIC | Age: 64
Discharge: HOME | End: 2019-11-21
Payer: MEDICARE

## 2019-11-21 DIAGNOSIS — N18.1 CKD (CHRONIC KIDNEY DISEASE), SYMPTOM MANAGEMENT ONLY, STAGE 1: ICD-10-CM

## 2019-11-21 DIAGNOSIS — N20.1 LEFT URETERAL STONE: ICD-10-CM

## 2019-11-21 DIAGNOSIS — N18.1 BENIGN HYPERTENSION WITH CKD (CHRONIC KIDNEY DISEASE) STAGE I: ICD-10-CM

## 2019-11-21 DIAGNOSIS — N20.0 KIDNEY STONES: ICD-10-CM

## 2019-11-21 DIAGNOSIS — N13.30 HYDRONEPHROSIS, UNSPECIFIED HYDRONEPHROSIS TYPE: ICD-10-CM

## 2019-11-21 DIAGNOSIS — I12.9 BENIGN HYPERTENSION WITH CKD (CHRONIC KIDNEY DISEASE) STAGE I: ICD-10-CM

## 2019-11-21 LAB
CALCIUM TIMED UR: NORMAL MG/X H
CALCIUM URINE: 18.5 MG/DL
CALCIUM, URINE: 255 MG/24 H (ref 20–275)
CREATININE TIMED UR: NORMAL MG/ X H
CREATININE URINE: 66.6 MG/DL
CREATININE, 24H UR: 918 MG/24 H (ref 740–1570)
HOURS COLLECTED: 24 H
PHOS,INORG TIMED UR: NORMAL MG/DL
PHOSPHORUS  URINE: 57.6 MG/DL
PHOSPHORUS, 24H UR: 794 MG/24 H (ref 400–1300)
PROTEIN 24 HOUR URINE: 97 MG/24 H
PROTEIN,TOT TIMED UR: NORMAL MG/X H
SODIUM 24 HOUR URINE: 154 MMOL/24 H (ref 40–220)
SODIUM TIMED UR: NORMAL MMOL/X H
SODIUM URINE: 112 MMOL/L
URIC ACID 24 HOUR URINE: 448 MG/24 H (ref 250–750)
URIC ACID TIMED UR: NORMAL MG/X H
URIC ACID, UR: 32.5 MG/DL
URINE TOTAL PROTEIN: 7 MG/DL
VOLUME: 1379 ML

## 2019-11-21 PROCEDURE — 84300 ASSAY OF URINE SODIUM: CPT

## 2019-11-21 PROCEDURE — 84560 ASSAY OF URINE/URIC ACID: CPT

## 2019-11-21 PROCEDURE — 84156 ASSAY OF PROTEIN URINE: CPT

## 2019-11-21 PROCEDURE — 83986 ASSAY PH BODY FLUID NOS: CPT

## 2019-11-21 PROCEDURE — 82570 ASSAY OF URINE CREATININE: CPT

## 2019-11-21 PROCEDURE — 84105 ASSAY OF URINE PHOSPHORUS: CPT

## 2019-11-21 PROCEDURE — 82507 ASSAY OF CITRATE: CPT

## 2019-11-21 PROCEDURE — 82131 AMINO ACIDS SINGLE QUANT: CPT

## 2019-11-21 PROCEDURE — 82340 ASSAY OF CALCIUM IN URINE: CPT

## 2019-11-21 PROCEDURE — 81050 URINALYSIS VOLUME MEASURE: CPT

## 2019-11-22 LAB — PH UA: 6.5 (ref 5–8)

## 2019-11-23 LAB
CITRIC ACID, U, 24HR: 789 MG/D (ref 320–1240)
CITRIC ACID, URINE: 572 MG/L
CITRIC ACID/CREATININE RATIO URINE: 867 MG/G
CREATININE URINE /24 HR: 910 MG/D (ref 500–1400)
CREATININE URINE /VOLUME: 66 MG/DL
HOURS COLLECTED: 24
URINE VOLUME: 1379

## 2019-11-26 LAB
CREATININE URINE: 62 MG/DL
CYSTINE, URINE: 0.61 MG/DL
CYSTINE, URINE: 41 UMOL/G CRT
CYSTINE,24HR,UR: 8 MG/D
HOURS COLLECTED: 24
URINE VOLUME: 1379

## 2019-12-01 ENCOUNTER — HOSPITAL ENCOUNTER (OUTPATIENT)
Dept: GENERAL RADIOLOGY | Facility: CLINIC | Age: 64
Discharge: HOME | End: 2019-12-03
Payer: MEDICARE

## 2019-12-01 ENCOUNTER — HOSPITAL ENCOUNTER (OUTPATIENT)
Facility: CLINIC | Age: 64
Discharge: HOME | End: 2019-12-01
Payer: MEDICARE

## 2019-12-01 ENCOUNTER — HOSPITAL ENCOUNTER (OUTPATIENT)
Facility: CLINIC | Age: 64
Discharge: HOME | End: 2019-12-03
Payer: MEDICARE

## 2019-12-01 DIAGNOSIS — N18.1 BENIGN HYPERTENSION WITH CKD (CHRONIC KIDNEY DISEASE) STAGE I: ICD-10-CM

## 2019-12-01 DIAGNOSIS — N18.1 CKD (CHRONIC KIDNEY DISEASE), SYMPTOM MANAGEMENT ONLY, STAGE 1: ICD-10-CM

## 2019-12-01 DIAGNOSIS — I12.9 BENIGN HYPERTENSION WITH CKD (CHRONIC KIDNEY DISEASE) STAGE I: ICD-10-CM

## 2019-12-01 DIAGNOSIS — N20.0 CALCULUS OF KIDNEY: ICD-10-CM

## 2019-12-01 DIAGNOSIS — N20.0 KIDNEY STONES: ICD-10-CM

## 2019-12-01 DIAGNOSIS — N13.30 HYDRONEPHROSIS, UNSPECIFIED HYDRONEPHROSIS TYPE: ICD-10-CM

## 2019-12-01 DIAGNOSIS — N20.1 LEFT URETERAL STONE: ICD-10-CM

## 2019-12-01 LAB
ABSOLUTE EOS #: 0.23 K/UL (ref 0–0.44)
ABSOLUTE IMMATURE GRANULOCYTE: <0.03 K/UL (ref 0–0.3)
ABSOLUTE LYMPH #: 1.94 K/UL (ref 1.1–3.7)
ABSOLUTE MONO #: 0.38 K/UL (ref 0.1–1.2)
ALBUMIN SERPL-MCNC: 4 G/DL (ref 3.5–5.2)
ALBUMIN/GLOBULIN RATIO: 1.1 (ref 1–2.5)
ALP BLD-CCNC: 87 U/L (ref 35–104)
ALT SERPL-CCNC: 14 U/L (ref 5–33)
ANION GAP SERPL CALCULATED.3IONS-SCNC: 11 MMOL/L (ref 9–17)
AST SERPL-CCNC: 16 U/L
BASOPHILS # BLD: 0 % (ref 0–2)
BASOPHILS ABSOLUTE: 0.03 K/UL (ref 0–0.2)
BILIRUB SERPL-MCNC: 0.58 MG/DL (ref 0.3–1.2)
BUN BLDV-MCNC: 20 MG/DL (ref 8–23)
BUN/CREAT BLD: ABNORMAL (ref 9–20)
CALCIUM SERPL-MCNC: 9.8 MG/DL (ref 8.6–10.4)
CHLORIDE BLD-SCNC: 99 MMOL/L (ref 98–107)
CO2: 29 MMOL/L (ref 20–31)
CREAT SERPL-MCNC: 0.64 MG/DL (ref 0.5–0.9)
DIFFERENTIAL TYPE: ABNORMAL
EOSINOPHILS RELATIVE PERCENT: 3 % (ref 1–4)
GFR AFRICAN AMERICAN: >60 ML/MIN
GFR NON-AFRICAN AMERICAN: >60 ML/MIN
GFR SERPL CREATININE-BSD FRML MDRD: ABNORMAL ML/MIN/{1.73_M2}
GFR SERPL CREATININE-BSD FRML MDRD: ABNORMAL ML/MIN/{1.73_M2}
GLUCOSE BLD-MCNC: 195 MG/DL (ref 70–99)
HCT VFR BLD CALC: 46.9 % (ref 36.3–47.1)
HEMOGLOBIN: 15.5 G/DL (ref 11.9–15.1)
IMMATURE GRANULOCYTES: 0 %
LYMPHOCYTES # BLD: 27 % (ref 24–43)
MAGNESIUM: 2 MG/DL (ref 1.6–2.6)
MCH RBC QN AUTO: 31.5 PG (ref 25.2–33.5)
MCHC RBC AUTO-ENTMCNC: 33 G/DL (ref 28.4–34.8)
MCV RBC AUTO: 95.3 FL (ref 82.6–102.9)
MONOCYTES # BLD: 5 % (ref 3–12)
NRBC AUTOMATED: 0 PER 100 WBC
PDW BLD-RTO: 11.9 % (ref 11.8–14.4)
PHOSPHORUS: 2.9 MG/DL (ref 2.6–4.5)
PLATELET # BLD: 303 K/UL (ref 138–453)
PLATELET ESTIMATE: ABNORMAL
PMV BLD AUTO: 9.9 FL (ref 8.1–13.5)
POTASSIUM SERPL-SCNC: 3.8 MMOL/L (ref 3.7–5.3)
RBC # BLD: 4.92 M/UL (ref 3.95–5.11)
RBC # BLD: ABNORMAL 10*6/UL
SEG NEUTROPHILS: 65 % (ref 36–65)
SEGMENTED NEUTROPHILS ABSOLUTE COUNT: 4.7 K/UL (ref 1.5–8.1)
SODIUM BLD-SCNC: 139 MMOL/L (ref 135–144)
TOTAL PROTEIN: 7.6 G/DL (ref 6.4–8.3)
VITAMIN D 25-HYDROXY: 26 NG/ML (ref 30–100)
WBC # BLD: 7.3 K/UL (ref 3.5–11.3)
WBC # BLD: ABNORMAL 10*3/UL

## 2019-12-01 PROCEDURE — 83735 ASSAY OF MAGNESIUM: CPT

## 2019-12-01 PROCEDURE — 74018 RADEX ABDOMEN 1 VIEW: CPT

## 2019-12-01 PROCEDURE — 82306 VITAMIN D 25 HYDROXY: CPT

## 2019-12-01 PROCEDURE — 36415 COLL VENOUS BLD VENIPUNCTURE: CPT

## 2019-12-01 PROCEDURE — 80053 COMPREHEN METABOLIC PANEL: CPT

## 2019-12-01 PROCEDURE — 85025 COMPLETE CBC W/AUTO DIFF WBC: CPT

## 2019-12-01 PROCEDURE — 84100 ASSAY OF PHOSPHORUS: CPT

## 2020-03-10 ENCOUNTER — HOSPITAL ENCOUNTER (OUTPATIENT)
Facility: CLINIC | Age: 65
Discharge: HOME | End: 2020-03-10
Payer: MEDICARE

## 2020-03-10 LAB
ANION GAP SERPL CALCULATED.3IONS-SCNC: 11 MMOL/L (ref 9–17)
BUN BLDV-MCNC: 24 MG/DL (ref 8–23)
BUN/CREAT BLD: ABNORMAL (ref 9–20)
CALCIUM SERPL-MCNC: 9.6 MG/DL (ref 8.6–10.4)
CHLORIDE BLD-SCNC: 102 MMOL/L (ref 98–107)
CO2: 27 MMOL/L (ref 20–31)
CREAT SERPL-MCNC: 0.73 MG/DL (ref 0.5–0.9)
GFR AFRICAN AMERICAN: >60 ML/MIN
GFR NON-AFRICAN AMERICAN: >60 ML/MIN
GFR SERPL CREATININE-BSD FRML MDRD: ABNORMAL ML/MIN/{1.73_M2}
GFR SERPL CREATININE-BSD FRML MDRD: ABNORMAL ML/MIN/{1.73_M2}
GLUCOSE BLD-MCNC: 99 MG/DL (ref 70–99)
POTASSIUM SERPL-SCNC: 4 MMOL/L (ref 3.7–5.3)
SODIUM BLD-SCNC: 140 MMOL/L (ref 135–144)

## 2020-03-10 PROCEDURE — 36415 COLL VENOUS BLD VENIPUNCTURE: CPT

## 2020-03-10 PROCEDURE — 80048 BASIC METABOLIC PNL TOTAL CA: CPT

## 2020-07-10 ENCOUNTER — HOSPITAL ENCOUNTER (OUTPATIENT)
Facility: CLINIC | Age: 65
Discharge: HOME | End: 2020-07-10
Payer: MEDICARE

## 2020-07-10 LAB — IGA: 274 MG/DL (ref 70–400)

## 2020-07-10 PROCEDURE — 82784 ASSAY IGA/IGD/IGG/IGM EACH: CPT

## 2020-07-10 PROCEDURE — 83993 ASSAY FOR CALPROTECTIN FECAL: CPT

## 2020-07-10 PROCEDURE — 83520 IMMUNOASSAY QUANT NOS NONAB: CPT

## 2020-07-10 PROCEDURE — 86003 ALLG SPEC IGE CRUDE XTRC EA: CPT

## 2020-07-10 PROCEDURE — 82785 ASSAY OF IGE: CPT

## 2020-07-10 PROCEDURE — 36415 COLL VENOUS BLD VENIPUNCTURE: CPT

## 2020-07-10 PROCEDURE — 83516 IMMUNOASSAY NONANTIBODY: CPT

## 2020-07-14 LAB
ALLERGEN CODFISH IGE: <0.34 KU/L (ref 0–0.34)
ALLERGEN COW MILK IGE: <0.34 KU/L (ref 0–0.34)
ALLERGEN EGG WHITE IGE: <0.34 KU/L (ref 0–0.34)
ALLERGEN GLUTEN IGE: <0.34 KU/L (ref 0–0.34)
ALLERGEN HAZELNUT: <0.34 KU/L (ref 0–0.34)
ALLERGEN PEANUT (F13) IGE: <0.34 KU/L (ref 0–0.34)
ALLERGEN SCALLOP IGE: <0.34 KU/L (ref 0–0.34)
ALLERGEN SOYBEAN IGE: <0.34 KU/L (ref 0–0.34)
ALLERGEN WALNUT IGE: <0.34 KU/L (ref 0–0.34)
ALLERGEN WHEAT IGE: <0.34 KU/L (ref 0–0.34)
GLIADIN DEAMINIDATED PEPTIDE AB IGA: 0.7 U/ML
GLIADIN DEAMINIDATED PEPTIDE AB IGG: 0.8 U/ML
IGE: 21 IU/ML
SESAME SEED IGE: <0.34 KU/L (ref 0–0.34)
SHRIMP: <0.34 KU/L (ref 0–0.34)
TISSUE TRANSGLUTAMINASE ANTIBODY IGG: 0.7 U/ML
TISSUE TRANSGLUTAMINASE IGA: 0.7 U/ML

## 2020-07-15 ENCOUNTER — HOSPITAL ENCOUNTER (OUTPATIENT)
Dept: NON INVASIVE DIAGNOSTICS | Age: 65
Discharge: HOME | End: 2020-07-15
Payer: MEDICARE

## 2020-07-15 ENCOUNTER — HOSPITAL ENCOUNTER (OUTPATIENT)
Dept: MAMMOGRAPHY | Age: 65
Discharge: HOME | End: 2020-07-17
Payer: MEDICARE

## 2020-07-15 LAB
CALPROTECTIN, FECAL: 41 UG/G
FECAL PANCREATIC ELASTASE-1: 426 UG/G

## 2020-07-15 PROCEDURE — 93017 CV STRESS TEST TRACING ONLY: CPT

## 2020-07-15 PROCEDURE — 77063 BREAST TOMOSYNTHESIS BI: CPT

## 2020-07-15 NOTE — PROCEDURES
[address]                 [address] [facility], [address] [facility]                              CARDIAC STRESS TEST    PATIENT NAME: Cass Martinez                      :        1955  MED REC NO:   [id number]                             ROOM:  ACCOUNT NO:   [de-identified]                           ADMIT DATE: 07/15/2020  PROVIDER:     Sukhjinder Cheek    DATE OF STUDY:  07/15/2020    TREADMILL STRESS TEST    ATTENDING PROVIDER:  Fabrice Glover MD    PRIMARY CARE PROVIDER:  Fabrice Glover MD    PERFORMING PHYSICIAN: Sukhjinder Cheek MD    INDICATION:  Abnormal EKG    HEART RATE  100% max predicted heart rate:  155  85% max predicted heart rate:  132  Duration:  6:38    Resting heart rate:  73  Maximum heart rate achieved:  134  % of predicted maximum:  86    BLOOD PRESSURE  Resting BP:  119/80  Peak BP:  144/85  Peak double product:  51705  METS:  7.30    REASON FOR TERMINATION:  85% MPHR achieved    BASELINE EKG DEMONSTRATED:  Sinus rhythm, incomplete left bundle branch block    During the stress test, the patient reported:  Shortness of breath    STRESS EKG DEMONSTRATED:  No abnormal changes    HEART RATE RESPONSE:   Normal response. BLOOD PRESSURE RESPONSE:   Normal response. The Duke treadmill score is 6 (specific only to the exercise findings). Marlow score for this test is: Low risk (> to 5)    EKG IMPRESSION:  Negative. FINAL IMPRESSION:  Negative.     COMMENTS:  Normal regular stress test.        Bella East    D: 07/15/2020 10:23:38       T: 07/15/2020 10:27:50     CAILIN/NED_JOSELIN  Job#: [id number]     Doc#: Unknown

## 2020-07-17 ENCOUNTER — HOSPITAL ENCOUNTER (OUTPATIENT)
Facility: CLINIC | Age: 65
Setting detail: SPECIMEN
Discharge: HOME | End: 2020-07-17
Payer: MEDICARE

## 2020-07-17 PROCEDURE — 0097U HC SO GI PTHGN MULT REV TRANS & AMP PRB TECH 22 TRGT: CPT

## 2020-07-22 LAB
SEND OUT REPORT: NORMAL
TEST NAME: NORMAL

## 2020-12-06 ENCOUNTER — HOSPITAL ENCOUNTER (OUTPATIENT)
Facility: CLINIC | Age: 65
Discharge: HOME | End: 2020-12-08
Payer: MEDICARE

## 2020-12-06 ENCOUNTER — HOSPITAL ENCOUNTER (OUTPATIENT)
Facility: CLINIC | Age: 65
Discharge: HOME | End: 2020-12-06
Payer: MEDICARE

## 2020-12-06 ENCOUNTER — HOSPITAL ENCOUNTER (OUTPATIENT)
Dept: GENERAL RADIOLOGY | Facility: CLINIC | Age: 65
Discharge: HOME | End: 2020-12-08
Payer: MEDICARE

## 2020-12-06 LAB
-: ABNORMAL
ALBUMIN SERPL-MCNC: 3.8 G/DL (ref 3.5–5.2)
ALBUMIN/GLOBULIN RATIO: 1.4 (ref 1–2.5)
ALP BLD-CCNC: 59 U/L (ref 35–104)
ALT SERPL-CCNC: 9 U/L (ref 5–33)
AMORPHOUS: ABNORMAL
ANION GAP SERPL CALCULATED.3IONS-SCNC: 9 MMOL/L (ref 9–17)
AST SERPL-CCNC: 15 U/L
BACTERIA: ABNORMAL
BILIRUB SERPL-MCNC: 0.37 MG/DL (ref 0.3–1.2)
BILIRUBIN URINE: NEGATIVE
BUN BLDV-MCNC: 19 MG/DL (ref 8–23)
BUN/CREAT BLD: ABNORMAL (ref 9–20)
CALCIUM SERPL-MCNC: 9.3 MG/DL (ref 8.6–10.4)
CASTS UA: ABNORMAL /LPF (ref 0–8)
CHLORIDE BLD-SCNC: 103 MMOL/L (ref 98–107)
CHOLESTEROL, FASTING: 181 MG/DL
CHOLESTEROL/HDL RATIO: 5
CO2: 27 MMOL/L (ref 20–31)
COLOR: YELLOW
COMMENT UA: ABNORMAL
CREAT SERPL-MCNC: 0.65 MG/DL (ref 0.5–0.9)
CREATININE URINE: 172 MG/DL (ref 28–217)
CRYSTALS, UA: ABNORMAL /HPF
EPITHELIAL CELLS UA: ABNORMAL /HPF (ref 0–5)
GFR AFRICAN AMERICAN: >60 ML/MIN
GFR NON-AFRICAN AMERICAN: >60 ML/MIN
GFR SERPL CREATININE-BSD FRML MDRD: ABNORMAL ML/MIN/{1.73_M2}
GFR SERPL CREATININE-BSD FRML MDRD: ABNORMAL ML/MIN/{1.73_M2}
GLUCOSE BLD-MCNC: 101 MG/DL (ref 70–99)
GLUCOSE URINE: NEGATIVE
HDLC SERPL-MCNC: 36 MG/DL
KETONES, URINE: NEGATIVE
LDL CHOLESTEROL: 118 MG/DL (ref 0–130)
LEUKOCYTE ESTERASE, URINE: ABNORMAL
MICROALBUMIN/CREAT 24H UR: <12 MG/L
MICROALBUMIN/CREAT UR-RTO: NORMAL MCG/MG CREAT
MUCUS: ABNORMAL
NITRITE, URINE: NEGATIVE
OTHER OBSERVATIONS UA: ABNORMAL
PH UA: 6.5 (ref 5–8)
POTASSIUM SERPL-SCNC: 4 MMOL/L (ref 3.7–5.3)
PROTEIN UA: NEGATIVE
RBC UA: ABNORMAL /HPF (ref 0–4)
RENAL EPITHELIAL, UA: ABNORMAL /HPF
SODIUM BLD-SCNC: 139 MMOL/L (ref 135–144)
SPECIFIC GRAVITY UA: 1.02 (ref 1–1.03)
THYROXINE, FREE: 1.2 NG/DL (ref 0.93–1.7)
TOTAL PROTEIN: 6.6 G/DL (ref 6.4–8.3)
TRICHOMONAS: ABNORMAL
TRIGLYCERIDE, FASTING: 135 MG/DL
TSH SERPL DL<=0.05 MIU/L-ACNC: 3.15 MIU/L (ref 0.3–5)
TURBIDITY: ABNORMAL
URINE HGB: NEGATIVE
UROBILINOGEN, URINE: NORMAL
VLDLC SERPL CALC-MCNC: ABNORMAL MG/DL (ref 1–30)
WBC UA: ABNORMAL /HPF (ref 0–5)
YEAST: ABNORMAL

## 2020-12-06 PROCEDURE — 74018 RADEX ABDOMEN 1 VIEW: CPT

## 2020-12-06 PROCEDURE — 80061 LIPID PANEL: CPT

## 2020-12-06 PROCEDURE — 81001 URINALYSIS AUTO W/SCOPE: CPT

## 2020-12-06 PROCEDURE — 82570 ASSAY OF URINE CREATININE: CPT

## 2020-12-06 PROCEDURE — 82043 UR ALBUMIN QUANTITATIVE: CPT

## 2020-12-06 PROCEDURE — 83036 HEMOGLOBIN GLYCOSYLATED A1C: CPT

## 2020-12-06 PROCEDURE — 80053 COMPREHEN METABOLIC PANEL: CPT

## 2020-12-06 PROCEDURE — 84443 ASSAY THYROID STIM HORMONE: CPT

## 2020-12-06 PROCEDURE — 36415 COLL VENOUS BLD VENIPUNCTURE: CPT

## 2020-12-06 PROCEDURE — 84439 ASSAY OF FREE THYROXINE: CPT

## 2020-12-07 LAB
ESTIMATED AVERAGE GLUCOSE: 117 MG/DL
HBA1C MFR BLD: 5.7 % (ref 4–6)

## 2021-03-06 ENCOUNTER — HOSPITAL ENCOUNTER (EMERGENCY)
Facility: CLINIC | Age: 66
Discharge: HOME | End: 2021-03-06
Attending: EMERGENCY MEDICINE
Payer: MEDICARE

## 2021-03-06 VITALS
DIASTOLIC BLOOD PRESSURE: 88 MMHG | OXYGEN SATURATION: 98 % | BODY MASS INDEX: 28.89 KG/M2 | HEART RATE: 79 BPM | RESPIRATION RATE: 20 BRPM | SYSTOLIC BLOOD PRESSURE: 133 MMHG | HEIGHT: 62 IN | WEIGHT: 157 LBS | TEMPERATURE: 98.3 F

## 2021-03-06 DIAGNOSIS — J02.9 VIRAL PHARYNGITIS: Primary | ICD-10-CM

## 2021-03-06 LAB
DIRECT EXAM: NORMAL
Lab: NORMAL
SPECIMEN DESCRIPTION: NORMAL

## 2021-03-06 PROCEDURE — 87880 STREP A ASSAY W/OPTIC: CPT

## 2021-03-06 PROCEDURE — 99282 EMERGENCY DEPT VISIT SF MDM: CPT

## 2021-03-06 ASSESSMENT — ENCOUNTER SYMPTOMS
VOMITING: 0
SHORTNESS OF BREATH: 0
SINUS PRESSURE: 1
EYE PAIN: 0
CONSTIPATION: 0
BACK PAIN: 0
NAUSEA: 0
ABDOMINAL PAIN: 0
SORE THROAT: 1
DIARRHEA: 0
COUGH: 0

## 2021-03-06 ASSESSMENT — PAIN DESCRIPTION - DESCRIPTORS: DESCRIPTORS: SORE

## 2021-03-06 ASSESSMENT — PAIN DESCRIPTION - LOCATION: LOCATION: THROAT

## 2021-03-06 NOTE — ED PROVIDER NOTES
PAST MEDICAL HISTORY    has a past medical history of Benign hypertension with CKD (chronic kidney disease), stage II, CKD (chronic kidney disease) stage 2, GFR 60-89 ml/min, CKD (chronic kidney disease) stage 2, GFR 60-89 ml/min, Diabetes mellitus ([facility] [address] Gall stones, Hypertension, Hypothyroidism, Kidney stones, and Kidney stones. SURGICAL HISTORY      has a past surgical history that includes Cholecystectomy; pr knee scope,diagnostic (Right, 2017); Knee arthroscopy (Right, 2017); pr revise/repair hand/foot nerve (Right, 2018); Colonoscopy; and Cystoscopy (Left, 1/15/2019). CURRENT MEDICATIONS       Previous Medications    ALPRAZOLAM (XANAX) 0.25 MG TABLET    Take 0.25 mg by mouth nightly as needed for Sleep. ASPIRIN 81 MG TABLET    Take 81 mg by mouth daily    CITALOPRAM (CELEXA) 20 MG TABLET    Take 20 mg by mouth daily    CROMOLYN SODIUM (NASAL ALLERGY NA)    1 spray by Nasal route daily    IBUPROFEN (ADVIL;MOTRIN) 800 MG TABLET    Take 800 mg by mouth every 6 hours as needed for Pain    LEVOTHYROXINE (SYNTHROID) 50 MCG TABLET    Take 50 mcg by mouth Daily. VALSARTAN (DIOVAN) 160 MG TABLET    Take 160 mg by mouth daily       ALLERGIES     is allergic to penicillins. FAMILY HISTORY     She indicated that her mother is . She indicated that her father is . She indicated that the status of her maternal grandmother is unknown. She indicated that the status of her maternal aunt is unknown.     family history includes Cancer in her father and mother; Diabetes in her maternal aunt, maternal grandmother, and mother; Heart Disease in her mother; High Blood Pressure in her mother; Stroke in her mother. SOCIAL HISTORY      reports that she has never smoked. She has never used smokeless tobacco. She reports current alcohol use. She reports that she does not use drugs. PHYSICAL EXAM     INITIAL VITALS:  height is 5' 2\" (1.575 m) and weight is 71.2 kg (157 lb).  Her oral temperature is 98.3 °F (36.8 °C). Her blood pressure is 133/88 and her pulse is 79. Her respiration is 20 and oxygen saturation is 98%. Physical Exam  Constitutional:       General: She is not in acute distress. Appearance: She is well-developed. She is not toxic-appearing or diaphoretic. HENT:      Head: Normocephalic and atraumatic. Right Ear: Tympanic membrane, ear canal and external ear normal.      Left Ear: Tympanic membrane, ear canal and external ear normal.      Mouth/Throat:      Mouth: Mucous membranes are moist.      Pharynx: Oropharyngeal exudate and posterior oropharyngeal erythema present. No pharyngeal swelling or uvula swelling. Eyes:      Conjunctiva/sclera: Conjunctivae normal.      Pupils: Pupils are equal, round, and reactive to light. Neck:      Musculoskeletal: Normal range of motion. Cardiovascular:      Rate and Rhythm: Normal rate and regular rhythm. Pulmonary:      Effort: Pulmonary effort is normal.      Breath sounds: Normal breath sounds. Abdominal:      General: Bowel sounds are normal.      Palpations: Abdomen is soft. Musculoskeletal:         General: No tenderness. Skin:     General: Skin is warm and dry. Capillary Refill: Capillary refill takes less than 2 seconds. Neurological:      Mental Status: She is alert and oriented to person, place, and time.    Psychiatric:         Behavior: Behavior normal.           DIFFERENTIAL DIAGNOSIS/ MDM:     Pharyngitis will check strep    DIAGNOSTIC RESULTS     EKG: All EKG's are interpreted by the Emergency Department Physician who either signs or Co-signs this chart in the absence of a cardiologist.        RADIOLOGY:   Non-plain film images such as CT, Ultrasound and MRI are read by the radiologist. Plain radiographic images are visualized and the radiologist interpretations are reviewed as follows:         LABS:  Results for orders placed or performed during the hospital encounter of 03/06/21   Strep Screen Group A Throat    Specimen: Throat   Result Value Ref Range    Specimen Description . THROAT     Special Requests NOT REPORTED     Direct Exam       Rapid Strep A negative. A negative Rapid Group A Strep Screen result does not rule out the possibility of Group A Streptococci in the specimen. A Group A Strep DNA test is available upon request.         EMERGENCY DEPARTMENT COURSE:   Vitals:    Vitals:    03/06/21 1115   BP: 133/88   Pulse: 79   Resp: 20   Temp: 98.3 °F (36.8 °C)   TempSrc: Oral   SpO2: 98%   Weight: 71.2 kg (157 lb)   Height: 5' 2\" (1.575 m)     -------------------------  BP: 133/88, Temp: 98.3 °F (36.8 °C), Pulse: 79, Resp: 20          CONSULTS:      PROCEDURES:  None    FINAL IMPRESSION      1. Viral pharyngitis          DISPOSITION/PLAN   Discharged in stable condition    PATIENT REFERRED TO:  Renetta Gore MD  [facility] [address] [phone]    In 3 days        DISCHARGE MEDICATIONS:  New Prescriptions    No medications on file       (Please note that portions of this note were completed with a voice recognition program.  Efforts were made to edit the dictations but occasionally words are mis-transcribed.)    Allen MD, F.A.A.E.M.   Attending Emergency Medicine Physician      Jose R Dunn MD  03/06/21 3349

## 2021-07-19 ENCOUNTER — HOSPITAL ENCOUNTER (OUTPATIENT)
Facility: CLINIC | Age: 66
Discharge: HOME | End: 2021-07-21
Payer: MEDICARE

## 2021-07-19 ENCOUNTER — HOSPITAL ENCOUNTER (OUTPATIENT)
Dept: GENERAL RADIOLOGY | Facility: CLINIC | Age: 66
Discharge: HOME | End: 2021-07-21
Payer: MEDICARE

## 2021-07-19 DIAGNOSIS — R20.0 TACTILE ANESTHESIA: ICD-10-CM

## 2021-07-19 PROCEDURE — 73130 X-RAY EXAM OF HAND: CPT

## 2021-10-29 ENCOUNTER — HOSPITAL ENCOUNTER (OUTPATIENT)
Dept: MAMMOGRAPHY | Age: 66
Discharge: HOME | End: 2021-10-31
Payer: MEDICARE

## 2021-10-29 DIAGNOSIS — Z12.31 ENCOUNTER FOR SCREENING MAMMOGRAM FOR MALIGNANT NEOPLASM OF BREAST: ICD-10-CM

## 2021-10-29 PROCEDURE — 77063 BREAST TOMOSYNTHESIS BI: CPT

## 2022-01-16 ENCOUNTER — HOSPITAL ENCOUNTER (EMERGENCY)
Facility: CLINIC | Age: 67
Discharge: HOME | End: 2022-01-16
Attending: EMERGENCY MEDICINE
Payer: MEDICARE

## 2022-01-16 VITALS
OXYGEN SATURATION: 98 % | HEART RATE: 91 BPM | RESPIRATION RATE: 15 BRPM | WEIGHT: 167 LBS | DIASTOLIC BLOOD PRESSURE: 87 MMHG | HEIGHT: 61 IN | SYSTOLIC BLOOD PRESSURE: 133 MMHG | TEMPERATURE: 98.6 F | BODY MASS INDEX: 31.53 KG/M2

## 2022-01-16 DIAGNOSIS — H92.02 LEFT EAR PAIN: ICD-10-CM

## 2022-01-16 DIAGNOSIS — R11.2 NAUSEA VOMITING AND DIARRHEA: Primary | ICD-10-CM

## 2022-01-16 DIAGNOSIS — R19.7 NAUSEA VOMITING AND DIARRHEA: Primary | ICD-10-CM

## 2022-01-16 PROCEDURE — 6360000002 HC RX W HCPCS: Performed by: EMERGENCY MEDICINE

## 2022-01-16 PROCEDURE — 2580000003 HC RX 258: Performed by: EMERGENCY MEDICINE

## 2022-01-16 PROCEDURE — 99284 EMERGENCY DEPT VISIT MOD MDM: CPT

## 2022-01-16 PROCEDURE — 6370000000 HC RX 637 (ALT 250 FOR IP): Performed by: EMERGENCY MEDICINE

## 2022-01-16 PROCEDURE — 96374 THER/PROPH/DIAG INJ IV PUSH: CPT

## 2022-01-16 PROCEDURE — 96375 TX/PRO/DX INJ NEW DRUG ADDON: CPT

## 2022-01-16 RX ORDER — ONDANSETRON 4 MG/1
4 TABLET, ORALLY DISINTEGRATING ORAL EVERY 8 HOURS PRN
Qty: 20 TABLET | Refills: 0 | Status: SHIPPED | OUTPATIENT
Start: 2022-01-16

## 2022-01-16 RX ORDER — ONDANSETRON 4 MG/1
4 TABLET, ORALLY DISINTEGRATING ORAL ONCE
Status: COMPLETED | OUTPATIENT
Start: 2022-01-16 | End: 2022-01-16

## 2022-01-16 RX ORDER — 0.9 % SODIUM CHLORIDE 0.9 %
1000 INTRAVENOUS SOLUTION INTRAVENOUS ONCE
Status: COMPLETED | OUTPATIENT
Start: 2022-01-16 | End: 2022-01-16

## 2022-01-16 RX ORDER — ONDANSETRON 2 MG/ML
4 INJECTION INTRAMUSCULAR; INTRAVENOUS ONCE
Status: COMPLETED | OUTPATIENT
Start: 2022-01-16 | End: 2022-01-16

## 2022-01-16 RX ORDER — HYDROCHLOROTHIAZIDE 12.5 MG/1
12.5 CAPSULE, GELATIN COATED ORAL DAILY
COMMUNITY

## 2022-01-16 RX ORDER — KETOROLAC TROMETHAMINE 30 MG/ML
30 INJECTION, SOLUTION INTRAMUSCULAR; INTRAVENOUS ONCE
Status: COMPLETED | OUTPATIENT
Start: 2022-01-16 | End: 2022-01-16

## 2022-01-16 RX ORDER — ATORVASTATIN CALCIUM 20 MG/1
20 TABLET, FILM COATED ORAL DAILY
COMMUNITY

## 2022-01-16 RX ADMIN — ONDANSETRON 4 MG: 2 INJECTION INTRAMUSCULAR; INTRAVENOUS at 22:09

## 2022-01-16 RX ADMIN — ONDANSETRON 4 MG: 4 TABLET, ORALLY DISINTEGRATING ORAL at 23:11

## 2022-01-16 RX ADMIN — KETOROLAC TROMETHAMINE 30 MG: 30 INJECTION, SOLUTION INTRAMUSCULAR at 22:10

## 2022-01-16 RX ADMIN — SODIUM CHLORIDE 1000 ML: 9 INJECTION, SOLUTION INTRAVENOUS at 22:08

## 2022-01-16 ASSESSMENT — PAIN SCALES - GENERAL
PAINLEVEL_OUTOF10: 1
PAINLEVEL_OUTOF10: 6
PAINLEVEL_OUTOF10: 8

## 2022-01-16 ASSESSMENT — ENCOUNTER SYMPTOMS
ALLERGIC/IMMUNOLOGIC NEGATIVE: 1
GASTROINTESTINAL NEGATIVE: 1
EYES NEGATIVE: 1
RESPIRATORY NEGATIVE: 1

## 2022-01-16 ASSESSMENT — PAIN DESCRIPTION - LOCATION
LOCATION: HEAD
LOCATION: HEAD

## 2022-01-16 ASSESSMENT — PAIN DESCRIPTION - FREQUENCY
FREQUENCY: CONTINUOUS
FREQUENCY: CONTINUOUS

## 2022-01-16 ASSESSMENT — PAIN DESCRIPTION - PAIN TYPE
TYPE: ACUTE PAIN
TYPE: ACUTE PAIN

## 2022-01-16 ASSESSMENT — PAIN DESCRIPTION - DESCRIPTORS
DESCRIPTORS: ACHING
DESCRIPTORS: ACHING;THROBBING

## 2022-01-16 ASSESSMENT — PAIN DESCRIPTION - ORIENTATION
ORIENTATION: ANTERIOR
ORIENTATION: ANTERIOR;POSTERIOR

## 2022-01-17 NOTE — ED NOTES
Pt presents to the ED via private auto. Pt states she went to an Urgent Care today and was diagnosed with an ear infection. Pt was placed on Augmentin. Pt took her first dose of Augmentin and became nauseous, had emesis x3. Pt also has been experiencing diarrhea, fatigue, severe headache x1 day.      Kemi Gross RN  01/16/22 6747

## 2022-01-17 NOTE — ED NOTES
Pt states her headache has improved and nausea is almost completely gone. Additional warm blankets given.   in waiting room updated     Keren Vazquez RN  01/16/22 6583

## 2022-01-17 NOTE — ED PROVIDER NOTES
eMERGENCY dEPARTMENT eNCOUnter      Pt Name: Brina Rand  MRN: [id number]  [place] 1955  Date of evaluation: 1/16/2022      CHIEF COMPLAINT       Chief Complaint   Patient presents with    Otalgia     left x1 day    Diarrhea    Nausea    Emesis     x1 day          HISTORY OF PRESENT ILLNESS    Brina Rand is a 78 y.o. female who presents for evaluation of a several day history of diarrhea and nausea and vomiting. Patient actually went to an urgent care today and was told she had an ear and face infection and was placed on Augmentin after her first dose of Augmentin became nauseous and threw up 3 times. She is also been fatigued had a headache and has not felt well for about 1 day. She is afebrile here, nontoxic looking her pulse was 109. REVIEW OF SYSTEMS     Review of Systems   Constitutional: Positive for fatigue. HENT: Positive for ear pain. Negative for ear discharge. Eyes: Negative. Respiratory: Negative. Cardiovascular: Negative. Gastrointestinal: Negative. Endocrine: Negative. Genitourinary: Negative. Musculoskeletal: Negative. Skin: Negative. Allergic/Immunologic: Negative. Neurological: Negative. Hematological: Negative. Psychiatric/Behavioral: Negative. PAST MEDICAL HISTORY    has a past medical history of Benign hypertension with CKD (chronic kidney disease), stage II, CKD (chronic kidney disease) stage 2, GFR 60-89 ml/min, CKD (chronic kidney disease) stage 2, GFR 60-89 ml/min, Diabetes mellitus ([address] Gall stones, Hypertension, Hypothyroidism, Kidney stones, and Kidney stones. SURGICAL HISTORY      has a past surgical history that includes Cholecystectomy; pr knee scope,diagnostic (Right, 12/26/2017); Knee arthroscopy (Right, 12/26/2017); pr revise/repair hand/foot nerve (Right, 5/21/2018); Colonoscopy; and Cystoscopy (Left, 1/15/2019).     CURRENT MEDICATIONS       Previous Medications    ALPRAZOLAM (XANAX) 0.25 MG TABLET    Take 0.25 mg by mouth nightly as needed for Sleep. ASPIRIN 81 MG TABLET    Take 81 mg by mouth daily    ATORVASTATIN (LIPITOR) 20 MG TABLET    Take 20 mg by mouth daily    CITALOPRAM (CELEXA) 20 MG TABLET    Take 20 mg by mouth daily    CROMOLYN SODIUM (NASAL ALLERGY NA)    1 spray by Nasal route daily    HYDROCHLOROTHIAZIDE (MICROZIDE) 12.5 MG CAPSULE    Take 12.5 mg by mouth daily    IBUPROFEN (ADVIL;MOTRIN) 800 MG TABLET    Take 800 mg by mouth every 6 hours as needed for Pain    LEVOTHYROXINE (SYNTHROID) 50 MCG TABLET    Take 50 mcg by mouth Daily. VALSARTAN (DIOVAN) 160 MG TABLET    Take 160 mg by mouth daily       ALLERGIES     is allergic to penicillins. FAMILY HISTORY     She indicated that her mother is . She indicated that her father is . She indicated that the status of her maternal grandmother is unknown. She indicated that the status of her maternal aunt is unknown.     family history includes Breast Cancer (age of onset: 72) in her mother; Cancer in her father; Diabetes in her maternal aunt, maternal grandmother, and mother; Heart Disease in her mother; High Blood Pressure in her mother; Stroke in her mother. SOCIAL HISTORY      reports that she has never smoked. She has never used smokeless tobacco. She reports current alcohol use. She reports that she does not use drugs. PHYSICAL EXAM     INITIAL VITALS:  height is 5' 1\" (1.549 m) and weight is 75.8 kg (167 lb). Her oral temperature is 98.6 °F (37 °C). Her blood pressure is 144/87 (abnormal) and her pulse is 109. Her respiration is 18 and oxygen saturation is 97%.      Constitutional: Alert, oriented x3, nontoxic, afebrile, answering questions appropriately, acting properly for age, in no acute distress  HEENT: Extraocular muscles intact, mucus membranes moist, TMs clear bilaterally, no posterior pharyngeal erythema or exudates, Pupils equal, round, reactive to light,   Neck: Trachea midline, Supple without lymphadenopathy, no posterior midline neck tenderness to palpation  Cardiovascular: Regular rhythm and rate no S3, S4, or murmurs  Respiratory: Clear to auscultation bilaterally no wheezes, rhonchi, rales, no respiratory distress  Gastrointestinal: Soft, nontender, nondistended, positive bowel sounds. No rebound, rigidity, or guarding. Musculoskeletal: No extremity pain or swelling  Neurologic: Moving all 4 extremities without difficulty there are no gross focal neurologic deficits  Skin: Warm and dry      DIFFERENTIAL DIAGNOSIS/ MDM:     Nausea, vomiting, diarrhea uncertain etiology. Patient will get a liter of fluids some Toradol and Zofran be reevaluated. DIAGNOSTIC RESULTS     EKG: All EKG's are interpreted by the Emergency Department Physician who either signs or Co-signs this chart in the absence of a cardiologist.        Not indicated unless otherwise documented above    LABS:  No results found for this visit on 01/16/22.     Not indicated unless otherwise documented above    RADIOLOGY:   I reviewed the radiologist interpretations:  No orders to display       Not indicated unless otherwise documented above    EMERGENCY DEPARTMENT COURSE:     The patient was given the following medications:  Orders Placed This Encounter   Medications    0.9 % sodium chloride bolus    ketorolac (TORADOL) injection 30 mg    ondansetron (ZOFRAN) injection 4 mg    ondansetron (ZOFRAN ODT) 4 MG disintegrating tablet     Sig: Take 1 tablet by mouth every 8 hours as needed for Nausea     Dispense:  20 tablet     Refill:  0        Vitals:    Vitals:    01/16/22 2144   BP: (!) 144/87   Pulse: 109   Resp: 18   Temp: 98.6 °F (37 °C)   TempSrc: Oral   SpO2: 97%   Weight: 75.8 kg (167 lb)   Height: 5' 1\" (1.549 m)     -------------------------  BP (!) 144/87   Pulse 109   Temp 98.6 °F (37 °C) (Oral)   Resp 18   Ht 5' 1\" (1.549 m)   Wt 75.8 kg (167 lb)   SpO2 97%   BMI 31.55 kg/m²         I have reviewed the disposition diagnosis with the patient and or their family/guardian. I have answered their questions and given discharge instructions. They voiced understanding of these instructions and did not have any further questions or complaints. CRITICAL CARE:    None    CONSULTS:    None    PROCEDURES:    None      OARRS Report if indicated             FINAL IMPRESSION      1. Nausea vomiting and diarrhea    2. Left ear pain          DISPOSITION/PLAN   DISPOSITION Decision To Discharge  I have reviewed the disposition diagnosis with the patient and or their family/guardian. I have answered their questions and given discharge instructions. They voiced understanding of these instructions and did not have any further questions or complaints. Reevaluation: Patient has gotten a liter of fluid she is got some Toradol and Zofran she feels much much better and is ready to be discharged home. At this point in time she probably has a viral illness that can be treated symptomatically and she should stop the Augmentin until she sees her own doctor. PATIENT REFERRED TO:  No follow-up provider specified.     DISCHARGE MEDICATIONS:  New Prescriptions    ONDANSETRON (ZOFRAN ODT) 4 MG DISINTEGRATING TABLET    Take 1 tablet by mouth every 8 hours as needed for Nausea       (Please note that portions of this note were completed with a voice recognition program.  Efforts were made to edit the dictations but occasionally words are mis-transcribed.)    Josiah Bishop MD  Attending Emergency Physician            Josiah Bishop MD  01/16/22 0055

## 2022-05-18 NOTE — FLOWSHEET NOTE
[] [facility]       Outpatient Physical        Therapy       [address]       Phone: [phone]       Fax: [phone] [] [facility] at [address]       Phone: [phone]       Fax: [phone] [] [place]. [address] [place] [address]   Phone: [phone]   Fax:  [phone]     Physical Therapy Daily Treatment Note    Date:  10/2/2017  Patient Name:  Connor Lyme    :  1955  MRN: [id number]   Physician: Marty Moya MD                                                         Insurance: [employer]  Medical Diagnosis: Right sided sciatica, Bursitis of R hip             Rehab Codes: M54.31, M70.71   Onset Date: 17                                  Next 's appt.: prn   Visit# / total visits: 3/12  Cancels/No Shows: 0/0    Subjective:    Pain:  [x] Yes  [] No Location:  R sciatica, R hip Pain Rating: (0-10 scale) 3/10  Pain altered Tx:  [] No  [x] Yes  Action: HEP- extension exercises    Comments: Pt stated 0/10 pain on Saturday and was able to attend a wedding with \"small wedges\" and was able to dance with no pain. Pt stated she reported slight increase in pain on  and continues to have intermittent radiating pain to the R calf. Pt stated she gets complete relief with lumbar extension exercises with her HEP. Objective:     Modalities:IFC to low back, R piriformis and posterior thigh with CP along low back/posterior hip and CP along anterior hip x 20 min- added 10/   Precautions: limit Flexion based exercises secondary to increased pain/sx; significant improvement with repeated lumbar extension   Exercises:   Exercise Reps/ Time Weight/ Level Comments Performed    Pt.  Education 5 min   Modifications for home/work activities, icing 2-3x per day, piriformis stretch/IT band stretch, instruction of use of heel lift     Standing back extension stretch 5x15\" complete relief with prone press ups and standing lumbar extension. [] No change. [] Other:       STG: (to be met in 8 treatments) (Start date: 9/26/17)   1. ? Pain: <6/10 at worst with aggravating activities  2. ? ROM: lumbar extension >20 deg, Side bending>  25 deg, flexion >70 deg all pain free  a. (-) HS tightness b/l  b. (-) elys test b/l  3. ? Strength:>4/5 for BLE strength (era. Hip)   4. ? Function: able to sit for >10 min with no increase in sx/sx  a. Able to walk >30 min with no increase in sx/sx  5. Independent with Home Exercise Programs  6. Corrected leg length discrepancy to improve walking mechanics and decrease stress on low back/R hip   LTG: (to be met in 12 treatments)  1. Pt to score less than 20% perceived disability with the LEFS. 2. Pt able to increase abdominal core strength to 3/5 for core stabilization to reduce low back pain. 3. Pt reports <2/10 with aggravating activities.         Patient goals: to be pain free     Pt. Education:  [x] Yes  [] No  [x] Reviewed Prior HEP/Ed  Method of Education: [x] Verbal  [x] Demo  [x] Written  Comprehension of Education:  [x] Verbalizes understanding. [] Demonstrates understanding. [x] Needs review. [] Demonstrates/verbalizes HEP/Ed previously given. Plan: [x] Continue per plan of care.    [] Other:      Time In: 9:00 a      Time Out: 10:14 a  Electronically signed by:  Brad Mazariegos PT continue all home Inhalers continue Albuterol MDI, Singular, Symbicort, Tiotropium

## 2022-05-23 ENCOUNTER — HOSPITAL ENCOUNTER (OUTPATIENT)
Facility: CLINIC | Age: 67
Discharge: HOME | End: 2022-05-23
Payer: MEDICARE

## 2022-05-23 LAB
CHOLESTEROL/HDL RATIO: 3.7
CHOLESTEROL: 138 MG/DL
CREATININE URINE: 190.6 MG/DL (ref 28–217)
HDLC SERPL-MCNC: 37 MG/DL
LDL CHOLESTEROL: 75 MG/DL (ref 0–130)
MICROALBUMIN/CREAT 24H UR: 22 MG/L
MICROALBUMIN/CREAT UR-RTO: 12 MCG/MG CREAT
THYROXINE, FREE: 1.2 NG/DL (ref 0.93–1.7)
TRIGL SERPL-MCNC: 132 MG/DL
TSH SERPL DL<=0.05 MIU/L-ACNC: 3.43 UIU/ML (ref 0.3–5)

## 2022-05-23 PROCEDURE — 84443 ASSAY THYROID STIM HORMONE: CPT

## 2022-05-23 PROCEDURE — 83036 HEMOGLOBIN GLYCOSYLATED A1C: CPT

## 2022-05-23 PROCEDURE — 82570 ASSAY OF URINE CREATININE: CPT

## 2022-05-23 PROCEDURE — 82043 UR ALBUMIN QUANTITATIVE: CPT

## 2022-05-23 PROCEDURE — 84439 ASSAY OF FREE THYROXINE: CPT

## 2022-05-23 PROCEDURE — 80061 LIPID PANEL: CPT

## 2022-05-23 PROCEDURE — 36415 COLL VENOUS BLD VENIPUNCTURE: CPT

## 2022-05-24 LAB
ESTIMATED AVERAGE GLUCOSE: 134 MG/DL
HBA1C MFR BLD: 6.3 % (ref 4–6)

## 2022-06-09 ENCOUNTER — HOSPITAL ENCOUNTER (OUTPATIENT)
Facility: CLINIC | Age: 67
Setting detail: SPECIMEN
Discharge: HOME | End: 2022-06-09
Payer: MEDICARE

## 2022-06-09 PROCEDURE — 86376 MICROSOMAL ANTIBODY EACH: CPT

## 2022-06-09 PROCEDURE — 36415 COLL VENOUS BLD VENIPUNCTURE: CPT

## 2022-06-11 LAB — THYROID PEROXIDASE (TPO) AB: 331 IU/ML (ref 0–25)

## 2022-06-12 ENCOUNTER — HOSPITAL ENCOUNTER (EMERGENCY)
Facility: CLINIC | Age: 67
Discharge: HOME | End: 2022-06-12
Attending: EMERGENCY MEDICINE
Payer: MEDICARE

## 2022-06-12 VITALS
RESPIRATION RATE: 16 BRPM | OXYGEN SATURATION: 96 % | WEIGHT: 165 LBS | BODY MASS INDEX: 30.36 KG/M2 | HEART RATE: 84 BPM | HEIGHT: 62 IN | SYSTOLIC BLOOD PRESSURE: 127 MMHG | DIASTOLIC BLOOD PRESSURE: 72 MMHG

## 2022-06-12 DIAGNOSIS — R31.9 URINARY TRACT INFECTION WITH HEMATURIA, SITE UNSPECIFIED: Primary | ICD-10-CM

## 2022-06-12 DIAGNOSIS — N39.0 URINARY TRACT INFECTION WITH HEMATURIA, SITE UNSPECIFIED: Primary | ICD-10-CM

## 2022-06-12 LAB
-: ABNORMAL
BACTERIA: ABNORMAL
BILIRUBIN URINE: NEGATIVE
COLOR: YELLOW
EPITHELIAL CELLS UA: ABNORMAL /HPF (ref 0–5)
GLUCOSE URINE: NEGATIVE
KETONES, URINE: NEGATIVE
LEUKOCYTE ESTERASE, URINE: ABNORMAL
NITRITE, URINE: NEGATIVE
OTHER OBSERVATIONS UA: ABNORMAL
PH UA: 6 (ref 5–8)
PROTEIN UA: NEGATIVE
RBC UA: ABNORMAL /HPF (ref 0–2)
SPECIFIC GRAVITY UA: 1 (ref 1–1.03)
TURBIDITY: ABNORMAL
URINE HGB: ABNORMAL
UROBILINOGEN, URINE: NORMAL
WBC UA: ABNORMAL /HPF (ref 0–5)

## 2022-06-12 PROCEDURE — 81001 URINALYSIS AUTO W/SCOPE: CPT

## 2022-06-12 PROCEDURE — 87186 SC STD MICRODIL/AGAR DIL: CPT

## 2022-06-12 PROCEDURE — 87086 URINE CULTURE/COLONY COUNT: CPT

## 2022-06-12 PROCEDURE — 87088 URINE BACTERIA CULTURE: CPT

## 2022-06-12 PROCEDURE — 99283 EMERGENCY DEPT VISIT LOW MDM: CPT

## 2022-06-12 RX ORDER — SULFAMETHOXAZOLE AND TRIMETHOPRIM 800; 160 MG/1; MG/1
1 TABLET ORAL 2 TIMES DAILY
Qty: 10 TABLET | Refills: 0 | Status: SHIPPED | OUTPATIENT
Start: 2022-06-12 | End: 2022-06-17

## 2022-06-12 ASSESSMENT — ENCOUNTER SYMPTOMS
ABDOMINAL DISTENTION: 1
BACK PAIN: 0
DIARRHEA: 0
SHORTNESS OF BREATH: 0
COUGH: 0
NAUSEA: 1
VOMITING: 0

## 2022-06-12 NOTE — ED PROVIDER NOTES
alcohol use. She reports that she does not use drugs. Family History: She indicated that her mother is . She indicated that her father is . She indicated that the status of her maternal grandmother is unknown. She indicated that the status of her maternal aunt is unknown.   family history includes Breast Cancer (age of onset: 72) in her mother; Cancer in her father; Diabetes in her maternal aunt, maternal grandmother, and mother; Heart Disease in her mother; High Blood Pressure in her mother; Stroke in her mother. Psychiatric History: None    Allergies: Penicillins    Home Medications:   Prior to Admission medications    Medication Sig Start Date End Date Taking? Authorizing Provider   sulfamethoxazole-trimethoprim (BACTRIM DS) 800-160 MG per tablet Take 1 tablet by mouth 2 times daily for 5 days 22 Yes JEIMY Vang CNP   hydroCHLOROthiazide (MICROZIDE) 12.5 MG capsule Take 12.5 mg by mouth daily    Historical Provider, MD   atorvastatin (LIPITOR) 20 MG tablet Take 20 mg by mouth daily    Historical Provider, MD   ondansetron (ZOFRAN ODT) 4 MG disintegrating tablet Take 1 tablet by mouth every 8 hours as needed for Nausea 22   Johnna Vela III, MD   Cromolyn Sodium (NASAL ALLERGY NA) 1 spray by Nasal route daily    Historical Provider, MD   ALPRAZolam (XANAX) 0.25 MG tablet Take 0.25 mg by mouth nightly as needed for Sleep. Historical Provider, MD   valsartan (DIOVAN) 160 MG tablet Take 160 mg by mouth daily    Historical Provider, MD   ibuprofen (ADVIL;MOTRIN) 800 MG tablet Take 800 mg by mouth every 6 hours as needed for Pain    Historical Provider, MD   citalopram (CELEXA) 20 MG tablet Take 20 mg by mouth daily    Historical Provider, MD   aspirin 81 MG tablet Take 81 mg by mouth daily    Historical Provider, MD   levothyroxine (SYNTHROID) 50 MCG tablet Take 50 mcg by mouth Daily.     Historical Provider, MD       REVIEW OF SYSTEMS  (2-9 systems for level 4, 10 armando for level 5)      Review of Systems   Constitutional: Negative for chills, fatigue and fever. Respiratory: Negative for cough and shortness of breath. Cardiovascular: Negative for chest pain and palpitations. Gastrointestinal: Positive for abdominal distention and nausea. Negative for diarrhea and vomiting. Genitourinary: Positive for dysuria and urgency. Negative for flank pain, hematuria, vaginal bleeding, vaginal discharge and vaginal pain. Musculoskeletal: Negative for back pain, neck pain and neck stiffness. Skin: Negative for rash and wound. Neurological: Negative for dizziness and headaches. All other systems negative except as marked. PHYSICAL EXAM  (up to 7 for level 4, 8 or more for level 5)      INITIAL VITALS:  height is 5' 2\" (1.575 m) and weight is 74.8 kg (165 lb). Her blood pressure is 127/72 and her pulse is 84. Her respiration is 16 and oxygen saturation is 96%. Vital signs reviewed. Physical Exam  Constitutional:       General: She is not in acute distress. Appearance: Normal appearance. She is not toxic-appearing. HENT:      Head: Normocephalic and atraumatic. Neck:      Trachea: No tracheal deviation. Cardiovascular:      Rate and Rhythm: Normal rate and regular rhythm. Pulses: Normal pulses. Heart sounds: Normal heart sounds. Pulmonary:      Effort: Pulmonary effort is normal.      Breath sounds: Normal breath sounds. Abdominal:      General: Abdomen is flat. There is no distension. Palpations: Abdomen is soft. There is no mass. Tenderness: There is no abdominal tenderness. There is no right CVA tenderness, left CVA tenderness, guarding or rebound. Hernia: No hernia is present. Musculoskeletal:      Cervical back: Neck supple. Right lower leg: No edema. Left lower leg: No edema. Skin:     General: Skin is warm and dry. Capillary Refill: Capillary refill takes less than 2 seconds.    Neurological: Mental Status: She is alert. Psychiatric:         Mood and Affect: Mood normal.         Behavior: Behavior normal.           DIFFERENTIAL DIAGNOSIS / MDM     Plan obtain UA to rule out any infectious cause for patient's symptoms. Urine shows infection with few bacteria and 20-50 WBCs. Will treat with Bactrim. Attending discharged this patient after discussing all labwork/imaging results that were finalized. Treatment plan and recommended follow-up discussed with them as well. PLAN (LABS / IMAGING / EKG):  Orders Placed This Encounter   Procedures    Culture, Urine    Urinalysis with Reflex to Culture    Microscopic Urinalysis       MEDICATIONS ORDERED:  Orders Placed This Encounter   Medications    sulfamethoxazole-trimethoprim (BACTRIM DS) 800-160 MG per tablet     Sig: Take 1 tablet by mouth 2 times daily for 5 days     Dispense:  10 tablet     Refill:  0       Controlled Substances Monitoring:     DIAGNOSTIC RESULTS     EKG: All EKG's are interpreted by the Emergency Department Physician who either signs or Co-signs this chart in the absenceof a cardiologist.        RADIOLOGY: All images are read by the radiologist and their interpretations are reviewed. No orders to display       No results found.     LABS:  Results for orders placed or performed during the hospital encounter of 06/12/22   Urinalysis with Reflex to Culture    Specimen: Urine   Result Value Ref Range    Color, UA Yellow Yellow    Turbidity UA SLIGHTLY CLOUDY (A) Clear    Glucose, Ur NEGATIVE NEGATIVE    Bilirubin Urine NEGATIVE NEGATIVE    Ketones, Urine NEGATIVE NEGATIVE    Specific Gravity, UA 1.003 (L) 1.005 - 1.030    Urine Hgb SMALL (A) NEGATIVE    pH, UA 6.0 5.0 - 8.0    Protein, UA NEGATIVE NEGATIVE    Urobilinogen, Urine Normal Normal    Nitrite, Urine NEGATIVE NEGATIVE    Leukocyte Esterase, Urine MODERATE (A) NEGATIVE   Microscopic Urinalysis   Result Value Ref Range    -          WBC, UA 20 TO 50 0 - 5 /HPF RBC, UA 0 TO 2 0 - 2 /HPF    Epithelial Cells UA 0 TO 2 0 - 5 /HPF    Bacteria, UA FEW (A) None    Other Observations UA Culture ordered based on defined criteria. (A) NOT REQ. EMERGENCY DEPARTMENT COURSE           Vitals:    Vitals:    06/12/22 1452   BP: 127/72   Pulse: 84   Resp: 16   SpO2: 96%   Weight: 74.8 kg (165 lb)   Height: 5' 2\" (1.575 m)     -------------------------  BP: 127/72,  , Heart Rate: 84, Resp: 16      RE-EVALUATION:  See ED Course notes above. CONSULTS:  None    PROCEDURES:  None    FINAL IMPRESSION      1. Urinary tract infection with hematuria, site unspecified          DISPOSITION / PLAN     CONDITION ON DISPOSITION:   Stable for discharge.      PATIENT REFERRED TO:  Yina Lawrence MD  [address]  [phone]    Call in 1 day      [facility]  / [place] [address]  [phone]    If symptoms worsen      DISCHARGE MEDICATIONS:  Discharge Medication List as of 6/12/2022  3:54 PM      START taking these medications    Details   sulfamethoxazole-trimethoprim (BACTRIM DS) 800-160 MG per tablet Take 1 tablet by mouth 2 times daily for 5 days, Disp-10 tablet, R-0Normal             JEIMY Busby CNP   Emergency Medicine Nurse Practitioner    (Please note that portions of this note were completed with a voice recognition program.  Efforts were made to edit the dictations but occasionally words aremis-transcribed.)       JEIMY Busby CNP  06/12/22 5525

## 2022-06-12 NOTE — ED PROVIDER NOTES
Emergency Department     Faculty Attestation    I performed a history and physical examination of the patient and discussed management with the mid level provider. I reviewed the mid level provider's note and agree with the documented findings and plan of care. Any areas of disagreement are noted on the chart. I was personally present for the key portions of any procedures. I have documented in the chart those procedures where I was not present during the key portions. I have reviewed the emergency nurses triage note. I agree with the chief complaint, past medical history, past surgical history, allergies, medications, social and family history as documented unless otherwise noted below. Documentation of the HPI, Physical Exam and Medical Decision Making performed by medical students or scribes is based on my personal performance of the HPI, PE and MDM. For Physician Assistant/ Nurse Practitioner cases/documentation I have personally evaluated this patient and have completed at least one if not all key elements of the E/M (history, physical exam, and MDM). Additional findings are as noted. Primary Care Physician:  Jamilah Brunson MD    CHIEF COMPLAINT       Chief Complaint   Patient presents with    Urinary Frequency    Nausea       RECENT VITALS:    ,  Heart Rate: 84, Resp: 16, BP: 127/72    LABS:  Labs Reviewed   URINALYSIS WITH REFLEX TO CULTURE - Abnormal; Notable for the following components:       Result Value    Turbidity UA SLIGHTLY CLOUDY (*)     Specific Gravity, UA 1.003 (*)     Urine Hgb SMALL (*)     Leukocyte Esterase, Urine MODERATE (*)     All other components within normal limits   MICROSCOPIC URINALYSIS - Abnormal; Notable for the following components:    Bacteria, UA FEW (*)     Other Observations UA Culture ordered based on defined criteria.  (*)     All other components within normal limits   CULTURE, URINE         PERTINENT ATTENDING PHYSICIAN COMMENTS:    With urinary frequency and urgency urine positive for infection we will send for culture placed her on Bactrim as she has noted to penicillin patient's post to have a you a later this week to get her cleared for her hysterectomy the beginning of July suggest that she wait until she is complete the course of antibiotics before she has a UA        Lizzy Avalos MD  06/12/22 3875

## 2022-06-14 LAB
CULTURE: ABNORMAL
SPECIMEN DESCRIPTION: ABNORMAL

## 2022-06-19 ENCOUNTER — HOSPITAL ENCOUNTER (OUTPATIENT)
Dept: GENERAL RADIOLOGY | Facility: CLINIC | Age: 67
Discharge: HOME | End: 2022-06-21
Payer: MEDICARE

## 2022-06-19 ENCOUNTER — HOSPITAL ENCOUNTER (OUTPATIENT)
Facility: CLINIC | Age: 67
Discharge: HOME | End: 2022-06-21
Payer: MEDICARE

## 2022-06-19 DIAGNOSIS — M25.551 RIGHT HIP PAIN: ICD-10-CM

## 2022-06-19 PROCEDURE — 73502 X-RAY EXAM HIP UNI 2-3 VIEWS: CPT

## 2022-09-24 ENCOUNTER — HOSPITAL ENCOUNTER (OUTPATIENT)
Facility: CLINIC | Age: 67
Discharge: HOME | End: 2022-09-24
Payer: MEDICARE

## 2022-09-24 LAB
ALBUMIN SERPL-MCNC: 4 G/DL (ref 3.5–5.2)
ALBUMIN/GLOBULIN RATIO: 1.4 (ref 1–2.5)
ALP BLD-CCNC: 83 U/L (ref 35–104)
ALT SERPL-CCNC: 12 U/L (ref 5–33)
ANION GAP SERPL CALCULATED.3IONS-SCNC: 12 MMOL/L (ref 9–17)
AST SERPL-CCNC: 14 U/L
BILIRUB SERPL-MCNC: 0.4 MG/DL (ref 0.3–1.2)
BILIRUBIN URINE: NEGATIVE
BUN BLDV-MCNC: 19 MG/DL (ref 8–23)
CALCIUM SERPL-MCNC: 9.2 MG/DL (ref 8.6–10.4)
CASTS UA: ABNORMAL /LPF (ref 0–2)
CASTS UA: ABNORMAL /LPF (ref 0–2)
CHLORIDE BLD-SCNC: 104 MMOL/L (ref 98–107)
CO2: 25 MMOL/L (ref 20–31)
COLOR: YELLOW
CREAT SERPL-MCNC: 0.81 MG/DL (ref 0.5–0.9)
CRYSTALS, UA: ABNORMAL /HPF
CRYSTALS, UA: ABNORMAL /HPF
EPITHELIAL CELLS UA: ABNORMAL /HPF (ref 0–5)
GFR AFRICAN AMERICAN: >60 ML/MIN
GFR NON-AFRICAN AMERICAN: >60 ML/MIN
GFR SERPL CREATININE-BSD FRML MDRD: NORMAL ML/MIN/{1.73_M2}
GLUCOSE BLD-MCNC: 99 MG/DL (ref 70–99)
GLUCOSE URINE: NEGATIVE
KETONES, URINE: NEGATIVE
LEUKOCYTE ESTERASE, URINE: ABNORMAL
MUCUS: ABNORMAL
NITRITE, URINE: NEGATIVE
PH UA: 5.5 (ref 5–8)
POTASSIUM SERPL-SCNC: 4 MMOL/L (ref 3.7–5.3)
PROTEIN UA: NEGATIVE
RBC UA: ABNORMAL /HPF (ref 0–2)
SODIUM BLD-SCNC: 141 MMOL/L (ref 135–144)
SPECIFIC GRAVITY UA: 1.02 (ref 1–1.03)
TOTAL PROTEIN: 6.8 G/DL (ref 6.4–8.3)
TURBIDITY: CLEAR
URINE HGB: ABNORMAL
UROBILINOGEN, URINE: NORMAL
WBC UA: ABNORMAL /HPF (ref 0–5)

## 2022-09-24 PROCEDURE — 86038 ANTINUCLEAR ANTIBODIES: CPT

## 2022-09-24 PROCEDURE — 84166 PROTEIN E-PHORESIS/URINE/CSF: CPT

## 2022-09-24 PROCEDURE — 82570 ASSAY OF URINE CREATININE: CPT

## 2022-09-24 PROCEDURE — 36415 COLL VENOUS BLD VENIPUNCTURE: CPT

## 2022-09-24 PROCEDURE — 84165 PROTEIN E-PHORESIS SERUM: CPT

## 2022-09-24 PROCEDURE — 82043 UR ALBUMIN QUANTITATIVE: CPT

## 2022-09-24 PROCEDURE — 80053 COMPREHEN METABOLIC PANEL: CPT

## 2022-09-24 PROCEDURE — 82330 ASSAY OF CALCIUM: CPT

## 2022-09-24 PROCEDURE — 81001 URINALYSIS AUTO W/SCOPE: CPT

## 2022-09-24 PROCEDURE — 83036 HEMOGLOBIN GLYCOSYLATED A1C: CPT

## 2022-09-24 PROCEDURE — 84155 ASSAY OF PROTEIN SERUM: CPT

## 2022-09-24 PROCEDURE — 83970 ASSAY OF PARATHORMONE: CPT

## 2022-09-24 PROCEDURE — 86225 DNA ANTIBODY NATIVE: CPT

## 2022-09-24 PROCEDURE — 84156 ASSAY OF PROTEIN URINE: CPT

## 2022-09-25 LAB
CALCIUM IONIZED: 1.3 MMOL/L (ref 1.13–1.33)
CREATININE URINE: 148.2 MG/DL (ref 28–217)
ESTIMATED AVERAGE GLUCOSE: 131 MG/DL
HBA1C MFR BLD: 6.2 % (ref 4–6)
MICROALBUMIN/CREAT 24H UR: 15 MG/L
MICROALBUMIN/CREAT UR-RTO: 10 MCG/MG CREAT
PTH INTACT: 31.2 PG/ML (ref 14–72)

## 2022-09-26 LAB
ALBUMIN (CALCULATED): 4.2 G/DL (ref 3.2–5.2)
ALBUMIN PERCENT: 62 % (ref 45–65)
ALPHA 1 PERCENT: 2 % (ref 3–6)
ALPHA 2 PERCENT: 9 % (ref 6–13)
ALPHA-1-GLOBULIN: 0.1 G/DL (ref 0.1–0.4)
ALPHA-2-GLOBULIN: 0.6 G/DL (ref 0.5–0.9)
ANTI DNA DOUBLE STRANDED: 1.4 IU/ML
ANTI-NUCLEAR ANTIBODY (ANA): NEGATIVE
BETA GLOBULIN: 0.8 G/DL (ref 0.5–1.1)
BETA PERCENT: 12 % (ref 11–19)
ENA ANTIBODIES SCREEN: 0.4 U/ML
GAMMA GLOBULIN %: 16 % (ref 9–20)
GAMMA GLOBULIN: 1.1 G/DL (ref 0.5–1.5)
P E INTERPRETATION, U: NORMAL
PATHOLOGIST: ABNORMAL
PATHOLOGIST: NORMAL
PROTEIN ELECTROPHORESIS, SERUM: ABNORMAL
SPECIMEN TYPE: NORMAL
TOTAL PROT. SUM,%: 101 % (ref 98–102)
TOTAL PROT. SUM: 6.8 G/DL (ref 6.3–8.2)
TOTAL PROTEIN: 6.8 G/DL (ref 6.4–8.3)
URINE TOTAL PROTEIN: 12 MG/DL

## 2022-12-01 ENCOUNTER — HOSPITAL ENCOUNTER (OUTPATIENT)
Dept: MAMMOGRAPHY | Age: 67
Discharge: HOME | End: 2022-12-03
Payer: MEDICARE

## 2022-12-01 DIAGNOSIS — Z12.31 VISIT FOR SCREENING MAMMOGRAM: ICD-10-CM

## 2022-12-01 PROCEDURE — 77067 SCR MAMMO BI INCL CAD: CPT

## 2023-01-24 ENCOUNTER — ANESTHESIA EVENT (OUTPATIENT)
Dept: OPERATING ROOM | Age: 68
End: 2023-01-24
Payer: MEDICARE

## 2023-01-24 ENCOUNTER — HOSPITAL ENCOUNTER (OUTPATIENT)
Age: 68
Setting detail: OUTPATIENT SURGERY
Discharge: HOME | End: 2023-01-24
Attending: INTERNAL MEDICINE | Admitting: INTERNAL MEDICINE
Payer: MEDICARE

## 2023-01-24 ENCOUNTER — ANESTHESIA (OUTPATIENT)
Dept: OPERATING ROOM | Age: 68
End: 2023-01-24
Payer: MEDICARE

## 2023-01-24 ENCOUNTER — APPOINTMENT (OUTPATIENT)
Dept: ULTRASOUND IMAGING | Age: 68
End: 2023-01-24
Attending: INTERNAL MEDICINE
Payer: MEDICARE

## 2023-01-24 VITALS
OXYGEN SATURATION: 100 % | DIASTOLIC BLOOD PRESSURE: 76 MMHG | HEART RATE: 70 BPM | RESPIRATION RATE: 20 BRPM | TEMPERATURE: 97.5 F | SYSTOLIC BLOOD PRESSURE: 115 MMHG

## 2023-01-24 LAB
ANION GAP: 11 MMOL/L (ref 7–16)
EGFR, POC: >60 ML/MIN/1.73M2
GLUCOSE BLD-MCNC: 135 MG/DL (ref 65–105)
GLUCOSE BLD-MCNC: 150 MG/DL (ref 74–100)
POC BUN: 21 MG/DL (ref 8–26)
POC CHLORIDE: 104 MMOL/L (ref 98–107)
POC CREATININE: 0.81 MG/DL (ref 0.51–1.19)
POC IONIZED CALCIUM: 1.26 MMOL/L (ref 1.15–1.33)
POC POTASSIUM: 4.2 MMOL/L (ref 3.5–4.5)
POC SODIUM: 142 MMOL/L (ref 138–146)
POC TCO2: 28 MMOL/L (ref 22–30)

## 2023-01-24 PROCEDURE — 3700000001 HC ADD 15 MINUTES (ANESTHESIA): Performed by: INTERNAL MEDICINE

## 2023-01-24 PROCEDURE — 7100000010 HC PHASE II RECOVERY - FIRST 15 MIN: Performed by: INTERNAL MEDICINE

## 2023-01-24 PROCEDURE — 3700000000 HC ANESTHESIA ATTENDED CARE: Performed by: INTERNAL MEDICINE

## 2023-01-24 PROCEDURE — 2580000003 HC RX 258: Performed by: NURSE ANESTHETIST, CERTIFIED REGISTERED

## 2023-01-24 PROCEDURE — 84520 ASSAY OF UREA NITROGEN: CPT

## 2023-01-24 PROCEDURE — 82565 ASSAY OF CREATININE: CPT

## 2023-01-24 PROCEDURE — 6360000002 HC RX W HCPCS: Performed by: NURSE ANESTHETIST, CERTIFIED REGISTERED

## 2023-01-24 PROCEDURE — 80051 ELECTROLYTE PANEL: CPT

## 2023-01-24 PROCEDURE — 2500000003 HC RX 250 WO HCPCS: Performed by: NURSE ANESTHETIST, CERTIFIED REGISTERED

## 2023-01-24 PROCEDURE — 82947 ASSAY GLUCOSE BLOOD QUANT: CPT

## 2023-01-24 PROCEDURE — 76975 GI ENDOSCOPIC ULTRASOUND: CPT | Performed by: INTERNAL MEDICINE

## 2023-01-24 PROCEDURE — 7100000011 HC PHASE II RECOVERY - ADDTL 15 MIN: Performed by: INTERNAL MEDICINE

## 2023-01-24 PROCEDURE — 82330 ASSAY OF CALCIUM: CPT

## 2023-01-24 PROCEDURE — 3609018500 HC EGD US SCOPE W/ADJACENT STRUCTURES: Performed by: INTERNAL MEDICINE

## 2023-01-24 RX ORDER — FENTANYL CITRATE 50 UG/ML
50 INJECTION, SOLUTION INTRAMUSCULAR; INTRAVENOUS EVERY 5 MIN PRN
Status: CANCELLED | OUTPATIENT
Start: 2023-01-24

## 2023-01-24 RX ORDER — SODIUM CHLORIDE 0.9 % (FLUSH) 0.9 %
5-40 SYRINGE (ML) INJECTION EVERY 12 HOURS SCHEDULED
Status: CANCELLED | OUTPATIENT
Start: 2023-01-24

## 2023-01-24 RX ORDER — PROPOFOL 10 MG/ML
INJECTION, EMULSION INTRAVENOUS PRN
Status: DISCONTINUED | OUTPATIENT
Start: 2023-01-24 | End: 2023-01-24 | Stop reason: SDUPTHER

## 2023-01-24 RX ORDER — FENTANYL CITRATE 50 UG/ML
25 INJECTION, SOLUTION INTRAMUSCULAR; INTRAVENOUS EVERY 5 MIN PRN
Status: CANCELLED | OUTPATIENT
Start: 2023-01-24

## 2023-01-24 RX ORDER — DIPHENHYDRAMINE HYDROCHLORIDE 50 MG/ML
12.5 INJECTION INTRAMUSCULAR; INTRAVENOUS
Status: CANCELLED | OUTPATIENT
Start: 2023-01-24 | End: 2023-01-25

## 2023-01-24 RX ORDER — SODIUM CHLORIDE, SODIUM LACTATE, POTASSIUM CHLORIDE, CALCIUM CHLORIDE 600; 310; 30; 20 MG/100ML; MG/100ML; MG/100ML; MG/100ML
INJECTION, SOLUTION INTRAVENOUS CONTINUOUS PRN
Status: DISCONTINUED | OUTPATIENT
Start: 2023-01-24 | End: 2023-01-24 | Stop reason: SDUPTHER

## 2023-01-24 RX ORDER — ONDANSETRON 2 MG/ML
4 INJECTION INTRAMUSCULAR; INTRAVENOUS
Status: CANCELLED | OUTPATIENT
Start: 2023-01-24 | End: 2023-01-25

## 2023-01-24 RX ORDER — SCOLOPAMINE TRANSDERMAL SYSTEM 1 MG/1
1 PATCH, EXTENDED RELEASE TRANSDERMAL
Status: DISCONTINUED | OUTPATIENT
Start: 2023-01-24 | End: 2023-01-24 | Stop reason: HOSPADM

## 2023-01-24 RX ORDER — FENTANYL CITRATE 50 UG/ML
50 INJECTION, SOLUTION INTRAMUSCULAR; INTRAVENOUS
Status: DISCONTINUED | OUTPATIENT
Start: 2023-01-24 | End: 2023-01-24 | Stop reason: HOSPADM

## 2023-01-24 RX ORDER — SODIUM CHLORIDE 0.9 % (FLUSH) 0.9 %
5-40 SYRINGE (ML) INJECTION PRN
Status: DISCONTINUED | OUTPATIENT
Start: 2023-01-24 | End: 2023-01-24 | Stop reason: HOSPADM

## 2023-01-24 RX ORDER — SODIUM CHLORIDE 0.9 % (FLUSH) 0.9 %
5-40 SYRINGE (ML) INJECTION EVERY 12 HOURS SCHEDULED
Status: DISCONTINUED | OUTPATIENT
Start: 2023-01-24 | End: 2023-01-24 | Stop reason: HOSPADM

## 2023-01-24 RX ORDER — LIDOCAINE HYDROCHLORIDE 10 MG/ML
INJECTION, SOLUTION EPIDURAL; INFILTRATION; INTRACAUDAL; PERINEURAL PRN
Status: DISCONTINUED | OUTPATIENT
Start: 2023-01-24 | End: 2023-01-24 | Stop reason: SDUPTHER

## 2023-01-24 RX ORDER — LIDOCAINE HYDROCHLORIDE 10 MG/ML
1 INJECTION, SOLUTION INFILTRATION; PERINEURAL
Status: DISCONTINUED | OUTPATIENT
Start: 2023-01-24 | End: 2023-01-24 | Stop reason: HOSPADM

## 2023-01-24 RX ORDER — FENTANYL CITRATE 50 UG/ML
25 INJECTION, SOLUTION INTRAMUSCULAR; INTRAVENOUS
Status: DISCONTINUED | OUTPATIENT
Start: 2023-01-24 | End: 2023-01-24 | Stop reason: HOSPADM

## 2023-01-24 RX ORDER — SODIUM CHLORIDE 9 MG/ML
INJECTION, SOLUTION INTRAVENOUS PRN
Status: CANCELLED | OUTPATIENT
Start: 2023-01-24

## 2023-01-24 RX ORDER — MIDAZOLAM HYDROCHLORIDE 2 MG/2ML
1 INJECTION, SOLUTION INTRAMUSCULAR; INTRAVENOUS EVERY 10 MIN PRN
Status: DISCONTINUED | OUTPATIENT
Start: 2023-01-24 | End: 2023-01-24 | Stop reason: HOSPADM

## 2023-01-24 RX ORDER — SODIUM CHLORIDE 0.9 % (FLUSH) 0.9 %
5-40 SYRINGE (ML) INJECTION PRN
Status: CANCELLED | OUTPATIENT
Start: 2023-01-24

## 2023-01-24 RX ORDER — SODIUM CHLORIDE 9 MG/ML
INJECTION, SOLUTION INTRAVENOUS PRN
Status: DISCONTINUED | OUTPATIENT
Start: 2023-01-24 | End: 2023-01-24 | Stop reason: HOSPADM

## 2023-01-24 RX ORDER — MIDAZOLAM HYDROCHLORIDE 1 MG/ML
INJECTION INTRAMUSCULAR; INTRAVENOUS PRN
Status: DISCONTINUED | OUTPATIENT
Start: 2023-01-24 | End: 2023-01-24 | Stop reason: SDUPTHER

## 2023-01-24 RX ORDER — SODIUM CHLORIDE, SODIUM LACTATE, POTASSIUM CHLORIDE, CALCIUM CHLORIDE 600; 310; 30; 20 MG/100ML; MG/100ML; MG/100ML; MG/100ML
INJECTION, SOLUTION INTRAVENOUS CONTINUOUS
Status: CANCELLED | OUTPATIENT
Start: 2023-01-24

## 2023-01-24 RX ADMIN — PROPOFOL 50 MG: 10 INJECTION, EMULSION INTRAVENOUS at 09:56

## 2023-01-24 RX ADMIN — LIDOCAINE HYDROCHLORIDE 50 MG: 10 INJECTION, SOLUTION EPIDURAL; INFILTRATION; INTRACAUDAL; PERINEURAL at 09:37

## 2023-01-24 RX ADMIN — PROPOFOL 50 MG: 10 INJECTION, EMULSION INTRAVENOUS at 09:51

## 2023-01-24 RX ADMIN — PROPOFOL 50 MG: 10 INJECTION, EMULSION INTRAVENOUS at 09:39

## 2023-01-24 RX ADMIN — PROPOFOL 50 MG: 10 INJECTION, EMULSION INTRAVENOUS at 09:37

## 2023-01-24 RX ADMIN — SODIUM CHLORIDE, POTASSIUM CHLORIDE, SODIUM LACTATE AND CALCIUM CHLORIDE: 600; 310; 30; 20 INJECTION, SOLUTION INTRAVENOUS at 09:19

## 2023-01-24 RX ADMIN — PROPOFOL 50 MG: 10 INJECTION, EMULSION INTRAVENOUS at 09:41

## 2023-01-24 RX ADMIN — MIDAZOLAM 1 MG: 1 INJECTION INTRAMUSCULAR; INTRAVENOUS at 09:36

## 2023-01-24 RX ADMIN — MIDAZOLAM 1 MG: 1 INJECTION INTRAMUSCULAR; INTRAVENOUS at 09:31

## 2023-01-24 ASSESSMENT — ENCOUNTER SYMPTOMS: SHORTNESS OF BREATH: 0

## 2023-01-24 ASSESSMENT — LIFESTYLE VARIABLES: SMOKING_STATUS: 0

## 2023-01-24 NOTE — ANESTHESIA POSTPROCEDURE EVALUATION
Department of Anesthesiology  Postprocedure Note    Patient: Georgia Albrecht  MRN: [id number]  YOB: 1955  Date of evaluation: 1/24/2023      Procedure Summary     Date: 01/24/23 Room / Location: [address]    Anesthesia Start: 5553 Anesthesia Stop: 3229    Procedure: RECTAL ENDOSCOPIC ULTRASOUND Diagnosis:       Incontinence of feces, unspecified fecal incontinence type      (FECAL INCONTINENCE)    Surgeons: Nba Drake MD Responsible Provider: Nathaniel Carter MD    Anesthesia Type: MAC ASA Status: 2          Anesthesia Type: No value filed.     Home Phase I:      Home Phase II: Home Score: 10      Anesthesia Post Evaluation    Patient location during evaluation: PACU  Patient participation: complete - patient participated  Level of consciousness: awake  Pain score: 1  Airway patency: patent  Nausea & Vomiting: no nausea and no vomiting  Complications: no  Cardiovascular status: blood pressure returned to baseline and hemodynamically stable  Respiratory status: acceptable  Hydration status: euvolemic

## 2023-01-24 NOTE — ANESTHESIA PRE PROCEDURE
Department of Anesthesiology  Preprocedure Note       Name:  Anders Mario   Age:  75 y.o.  :  1955                                          MRN:  [id number]         Date:  2023      Surgeon: Ailyn Driver):  Jaciel Oconnor MD    Procedure: RECTAL ENDOSCOPIC ULTRASOUND    Medications prior to admission:   Prior to Admission medications    Medication Sig Start Date End Date Taking? Authorizing Provider   hydroCHLOROthiazide (MICROZIDE) 12.5 MG capsule Take 12.5 mg by mouth daily    Historical Provider, MD   atorvastatin (LIPITOR) 20 MG tablet Take 20 mg by mouth daily    Historical Provider, MD   ondansetron (ZOFRAN ODT) 4 MG disintegrating tablet Take 1 tablet by mouth every 8 hours as needed for Nausea 22   Lacey Vera III, MD   Cromolyn Sodium (NASAL ALLERGY NA) 1 spray by Nasal route daily    Historical Provider, MD   ALPRAZolam Baires Latus) 0.25 MG tablet Take 0.25 mg by mouth nightly as needed for Sleep. Historical Provider, MD   valsartan (DIOVAN) 160 MG tablet Take 160 mg by mouth daily    Historical Provider, MD   ibuprofen (ADVIL;MOTRIN) 800 MG tablet Take 800 mg by mouth every 6 hours as needed for Pain    Historical Provider, MD   citalopram (CELEXA) 20 MG tablet Take 20 mg by mouth daily    Historical Provider, MD   aspirin 81 MG tablet Take 81 mg by mouth daily    Historical Provider, MD   levothyroxine (SYNTHROID) 50 MCG tablet Take 50 mcg by mouth Daily. Historical Provider, MD       Current medications:    No current outpatient medications on file. No current facility-administered medications for this visit. Allergies:     Allergies   Allergen Reactions    Penicillins        Problem List:    Patient Active Problem List   Diagnosis Code    Right peroneal nerve palsy G57.31    Right foot drop M21.371    Hydronephrosis N13.30    Left ureteral stone N20.1    CKD (chronic kidney disease) stage 2, GFR 60-89 ml/min N18.2    Kidney stones N20.0    CKD (chronic kidney disease) stage 1, GFR 90 ml/min or greater N18.1    Hypothyroidism E03.9    Hypercholesterolemia E78.00    Depressive disorder F32. A    Hypertensive disorder I10       Past Medical History:        Diagnosis Date    Benign hypertension with CKD (chronic kidney disease), stage II     CKD (chronic kidney disease) stage 2, GFR 60-89 ml/min     Diabetes mellitus (HCC)     not on any meds since losing weight    Gall stones     Hypertension     Hypothyroidism     Kidney stones        Past Surgical History:        Procedure Laterality Date    CHOLECYSTECTOMY      COLONOSCOPY      CYSTOSCOPY Left 1/15/2019    CYSTOSCOPY URETEROSCOPY LASER HOLMIUM  LITHO & STENT INSERTION performed by Marthe Denver, MD at [facility] ARTHROSCOPY Right 12/26/2017    DE ARTHROSCOPY KNEE DIAGNOSTIC W/WO SYNOVIAL BX SPX Right 12/26/2017    ARTHROSCOPY KNEE performed by Reji Gallo MD at [address] [facility] [place] HAND/FOOT Right 5/21/2018    PERONEAL NERVE RELEASE performed by Georgi Dance, MD at [facility] History:    Social History     Tobacco Use    Smoking status: Never    Smokeless tobacco: Never   Substance Use Topics    Alcohol use: Yes     Comment: occ                                Counseling given: Not Answered      Vital Signs (Current): There were no vitals filed for this visit.                                            BP Readings from Last 3 Encounters:   06/12/22 127/72   01/16/22 133/87   03/06/21 133/88       NPO Status:                                                                                 BMI:   Wt Readings from Last 3 Encounters:   06/12/22 165 lb (74.8 kg)   01/16/22 167 lb (75.8 kg)   03/06/21 157 lb (71.2 kg)     There is no height or weight on file to calculate BMI.    CBC:   Lab Results   Component Value Date/Time    WBC 7.3 12/01/2019 11:29 AM    RBC 4.92 12/01/2019 11:29 AM    HGB 15.5 12/01/2019 11:29 AM    HCT 46.9 12/01/2019 11:29 AM    MCV 95.3 12/01/2019 11:29 AM    RDW 11.9 12/01/2019 11:29 AM     12/01/2019 11:29 AM       CMP:   Lab Results   Component Value Date/Time     09/24/2022 03:14 PM    K 4.0 09/24/2022 03:14 PM     09/24/2022 03:14 PM    CO2 25 09/24/2022 03:14 PM    BUN 19 09/24/2022 03:14 PM    CREATININE 0.81 09/24/2022 03:14 PM    GFRAA >60 09/24/2022 03:14 PM    LABGLOM >60 09/24/2022 03:14 PM    GLUCOSE 99 09/24/2022 03:14 PM    GLUCOSE 141 04/27/2012 09:41 AM    PROT 6.8 09/24/2022 03:14 PM    PROT 6.8 09/24/2022 03:14 PM    CALCIUM 9.2 09/24/2022 03:14 PM    BILITOT 0.4 09/24/2022 03:14 PM    ALKPHOS 83 09/24/2022 03:14 PM    AST 14 09/24/2022 03:14 PM    ALT 12 09/24/2022 03:14 PM       POC Tests: No results for input(s): POCGLU, POCNA, POCK, POCCL, POCBUN, POCHEMO, POCHCT in the last 72 hours.     Coags: No results found for: PROTIME, INR, APTT    HCG (If Applicable): No results found for: PREGTESTUR, PREGSERUM, HCG, HCGQUANT     ABGs: No results found for: PHART, PO2ART, CJT7VLN, UNW2KBW, BEART, J4NETWEX     Type & Screen (If Applicable):  No results found for: LABABO, [address]    Anesthesia Evaluation  Patient summary reviewed and Nursing notes reviewed no history of anesthetic complications:   Airway: Mallampati: II  TM distance: >3 FB   Neck ROM: full  Mouth opening: > = 3 FB   Dental: normal exam         Pulmonary:Negative Pulmonary ROS and normal exam  breath sounds clear to auscultation      (-) pneumonia, COPD, asthma, shortness of breath, recent URI, sleep apnea and not a current smoker                           Cardiovascular:    (+) hypertension: no interval change,     (-) pacemaker, valvular problems/murmurs, past MI, CAD, CABG/stent, dysrhythmias,  angina,  CHF, orthopnea, PND,  LOWE, no pulmonary hypertension and no hyperlipidemia    ECG reviewed  Rhythm: regular  Rate: normal                    Neuro/Psych:   (+) neuromuscular disease:,    (-) seizures, TIA, CVA, headaches, psychiatric history and depression/anxiety ROS comment: Right peroneal nerve palsy,Right foot drop  GI/Hepatic/Renal:   (+) renal disease: kidney stones,      (-) hiatal hernia, GERD, PUD, hepatitis, liver disease, bowel prep and no morbid obesity       Endo/Other:    (+) Diabetes, hypothyroidism::., .    (-) hyperthyroidism, blood dyscrasia, arthritis               Abdominal:             Vascular: negative vascular ROS. - PVD, DVT and PE. Other Findings:             Anesthesia Plan      MAC     ASA 2       Induction: intravenous. Anesthetic plan and risks discussed with patient. Plan discussed with CRNA.               Narrative & Impression    Normal sinus rhythm  Nonspecific T wave abnormality  Abnormal ECG  No previous ECGs available      Specimen Collected: 01/13/19 19:06 EST          Stress 7/2020  EKG IMPRESSION:  Negative.     FINAL IMPRESSION:  Negative.     COMMENTS:  Normal regular stress test.         Devaughn Blanco MD   1/24/2023

## 2023-01-24 NOTE — OP NOTE
Operative Note      Patient: Aleyda Mcneill  YOB: 1955  MRN: [id number]    Date of Procedure: 1/24/2023    Pre-Op Diagnosis: FECAL INCONTINENCE    Post-Op Diagnosis: Same       Procedure(s):  RECTAL ENDOSCOPIC ULTRASOUND AND FLEXIBLE SIGMOIDOSCOPY. Surgeon(s):  Monika Ny MD    Assistant:   First Assistant: Amelia Tang RN    Anesthesia: Monitor Anesthesia Care    Estimated Blood Loss (mL): Minimal    Complications: None    Specimens:   * No specimens in log *    Implants:  * No implants in log *      Drains: * No LDAs found *        Description of Procedure:  Informed consent was obtained from the patient after explanation of the procedure including indications, description of the procedure,  benefits and possible risks and complications of the procedure, and alternatives. Questions were answered. The patient's history was reviewed and a directed physical examination was performed prior to the procedure. Patient was monitored throughout the procedure with pulse oximetry and periodic assessment of vital signs. With the patient initially in the left lateral decubitus position, a digital rectal examination was performed and revealed negative without mass, lesions or tenderness. The Olympus video colonoscope was placed in the patient's rectum and advanced without difficulty  to  a distance of 25 cm. The prep was good. Examination of the mucosa was performed during both introduction and withdrawal of the colonoscope. Retroflexed view of the rectum was performed. The patient  was taken to the recovery area in good condition. Findings:     1. Normal Recto-sigmoid examination. 2. Internal hemorrhoids seen during retroflexion view    RECTAL EUS:  -Internal anal sphincter was disrupted from 2 o'clock position to 4 o'clock position.     - External anal sphincter was disrupted at 8 o'clock position.       - No Lymphadenopathy seen in perirectal area. Recommendations:   Follow up with [facility]-rectal surgery.      Electronically signed by Miquel Parmar MD on 1/24/2023 at 10:11 AM

## 2023-03-16 NOTE — H&P
History and Physical    Pt Name: Gabino Osman  MRN: [id number]  YOB: 1955  Date of evaluation: 1/24/2023  Primary Care Physician: Rosario Zafar MD    SUBJECTIVE:   History of Chief Complaint:    Gabino Osman is a 75 y.o. female who is scheduled today for RECTAL ENDOSCOPIC ULTRASOUND. She reports \"trouble holding stool sometimes\", but reports intermittent constipation. She has followed with Dr. Neeru Wilkes and reports she is s/p \"bladder and rectal pull up\" and has also consulted with general surgery Dr. Nay Briones and patient states she is not a surgical candidate. Allergies  is allergic to penicillins. Medications  Prior to Admission medications    Medication Sig Start Date End Date Taking? Authorizing Provider   hydroCHLOROthiazide (MICROZIDE) 12.5 MG capsule Take 12.5 mg by mouth daily    Historical Provider, MD   atorvastatin (LIPITOR) 20 MG tablet Take 20 mg by mouth daily    Historical Provider, MD   ondansetron (ZOFRAN ODT) 4 MG disintegrating tablet Take 1 tablet by mouth every 8 hours as needed for Nausea 1/16/22   Sukumar Bravo III, MD   Cromolyn Sodium (NASAL ALLERGY NA) 1 spray by Nasal route daily    Historical Provider, MD   ALPRAZolam Fannie Dia) 0.25 MG tablet Take 0.25 mg by mouth nightly as needed for Sleep. Historical Provider, MD   valsartan (DIOVAN) 160 MG tablet Take 160 mg by mouth daily    Historical Provider, MD   ibuprofen (ADVIL;MOTRIN) 800 MG tablet Take 800 mg by mouth every 6 hours as needed for Pain    Historical Provider, MD   citalopram (CELEXA) 20 MG tablet Take 20 mg by mouth daily    Historical Provider, MD   aspirin 81 MG tablet Take 81 mg by mouth daily    Historical Provider, MD   levothyroxine (SYNTHROID) 50 MCG tablet Take 50 mcg by mouth Daily.     Historical Provider, MD     Past Medical History    has a past medical history of Benign hypertension with CKD (chronic kidney disease), stage II, CKD (chronic kidney disease) stage 2, GFR 60-89 ml/min, Diabetes mellitus ([facility] [address] Gall stones, History of hematuria, Hypertension, Hypothyroidism, and Kidney stones. Past Surgical History   has a past surgical history that includes Cholecystectomy; pr arthroscopy knee diagnostic w/wo synovial bx spx (Right, 2017); Knee arthroscopy (Right, 2017); pr neuroplasty nerve hand/foot (Right, 2018); Colonoscopy; Cystoscopy (Left, 01/15/2019); and cardiovascular stress test (). Social History   reports that she has never smoked. She has never used smokeless tobacco.   reports current alcohol use. reports no history of drug use. Marital Status    Children 3  Occupation RN, instructor, [employer]   Family History  Family Status   Relation Name Status    Mother      Father      Antolin Steele  (Not Specified)    MGM  (Not Specified)     family history includes Breast Cancer (age of onset: 72) in her mother; Cancer in her father; Diabetes in her maternal aunt, maternal grandmother, and mother; Heart Disease in her mother; High Blood Pressure in her mother; Stroke in her mother. Review of Systems:  CONSTITUTIONAL:   negative for fevers, chills, fatigue and malaise    EYES:   negative for double vision, blurred vision and photophobia +glasses   HEENT:   negative for tinnitus, epistaxis and sore throat     RESPIRATORY:   negative for cough, shortness of breath, wheezing     CARDIOVASCULAR:   negative for chest pain, palpitations, syncope, edema     GASTROINTESTINAL:   negative for nausea, vomiting  +See HPI   GENITOURINARY:   negative for incontinence     MUSCULOSKELETAL:   negative for neck or back pain     NEUROLOGICAL:   Negative for weakness and tingling  negative for headaches and dizziness     PSYCHIATRIC:   negative for anxiety       OBJECTIVE:   VITALS:  temporal temperature is 98.1 °F (36.7 °C). Her blood pressure is 126/80 and her pulse is 76. Her respiration is 22 and oxygen saturation is 98%. CONSTITUTIONAL:alert & oriented x 3, no acute distress.  Calm and pleasant. Very friendly, great historian. SKIN:  Warm and dry, no rashes on exposed areas of skin   HEAD:  Normocephalic, atraumatic   EYES: PERRL. EOMs intact. Wearing glasses. EARS:  Equal bilaterally, no edema or thickening, skin is intact without lumps or lesions. No discharge. Hearing grossly WNL. NOSE:  Nares patent. No rhinorrhea   MOUTH/THROAT:  Mucous membranes moist, tongue is pink, uvula midline, teeth appear to be intact,  NECK:Full ROM  LUNGS: Respirations even and non-labored. Clear to auscultation bilaterally, no wheezes, rales, or rhonchi. CARDIOVASCULAR: Regular rate and rhythm, no murmurs/rubs/gallops   ABDOMEN: soft, non-tender, non-distended, bowel sounds active x 4   EXTREMITIES: No edema bilateral lower extremities. No varicosities bilateral lower extremities. NEUROLOGIC: CN II-XII are grossly intact. Gait not assessed.   IMPRESSIONS:   FECAL INCONTINENCE  PLANS:   RECTAL ENDOSCOPIC ULTRASOUND    JEIMY DU CNP   Electronically signed 1/24/2023 at 8:16 AM No

## 2023-07-12 ENCOUNTER — HOSPITAL ENCOUNTER (OUTPATIENT)
Dept: PREADMISSION TESTING | Age: 68
Discharge: HOME | End: 2023-07-12
Attending: UROLOGY | Admitting: UROLOGY
Payer: MEDICARE

## 2023-07-12 VITALS
OXYGEN SATURATION: 99 % | DIASTOLIC BLOOD PRESSURE: 69 MMHG | BODY MASS INDEX: 29.81 KG/M2 | SYSTOLIC BLOOD PRESSURE: 123 MMHG | WEIGHT: 162 LBS | TEMPERATURE: 97.9 F | RESPIRATION RATE: 16 BRPM | HEIGHT: 62 IN | HEART RATE: 80 BPM

## 2023-07-12 LAB
ANION GAP SERPL CALCULATED.3IONS-SCNC: 9 MMOL/L (ref 9–17)
BASOPHILS # BLD: 0 K/UL (ref 0–0.2)
BASOPHILS NFR BLD: 0 % (ref 0–2)
BUN SERPL-MCNC: 20 MG/DL (ref 8–23)
CALCIUM SERPL-MCNC: 10 MG/DL (ref 8.6–10.4)
CHLORIDE SERPL-SCNC: 100 MMOL/L (ref 98–107)
CO2 SERPL-SCNC: 26 MMOL/L (ref 20–31)
CREAT SERPL-MCNC: 0.7 MG/DL (ref 0.5–0.9)
EOSINOPHIL # BLD: 0 K/UL (ref 0–0.4)
EOSINOPHILS RELATIVE PERCENT: 0 % (ref 0–4)
ERYTHROCYTE [DISTWIDTH] IN BLOOD BY AUTOMATED COUNT: 12.8 % (ref 11.5–14.9)
GFR SERPL CREATININE-BSD FRML MDRD: >60 ML/MIN/1.73M2
GLUCOSE SERPL-MCNC: 219 MG/DL (ref 70–99)
HCT VFR BLD AUTO: 38.2 % (ref 36–46)
HGB BLD-MCNC: 13.2 G/DL (ref 12–16)
LYMPHOCYTES # BLD: 9 % (ref 24–44)
LYMPHOCYTES NFR BLD: 0.7 K/UL (ref 1–4.8)
MCH RBC QN AUTO: 32.3 PG (ref 26–34)
MCHC RBC AUTO-ENTMCNC: 34.7 G/DL (ref 31–37)
MCV RBC AUTO: 93.2 FL (ref 80–100)
MONOCYTES NFR BLD: 0.3 K/UL (ref 0.1–1.3)
MONOCYTES NFR BLD: 4 % (ref 1–7)
NEUTROPHILS NFR BLD: 87 % (ref 36–66)
NEUTS SEG NFR BLD: 6.8 K/UL (ref 1.3–9.1)
PLATELET # BLD AUTO: 272 K/UL (ref 150–450)
PMV BLD AUTO: 8.1 FL (ref 6–12)
POTASSIUM SERPL-SCNC: 4.1 MMOL/L (ref 3.7–5.3)
RBC # BLD AUTO: 4.1 M/UL (ref 4–5.2)
SODIUM SERPL-SCNC: 135 MMOL/L (ref 135–144)
WBC OTHER # BLD: 7.9 K/UL (ref 3.5–11)

## 2023-07-12 PROCEDURE — 93005 ELECTROCARDIOGRAM TRACING: CPT | Performed by: ANESTHESIOLOGY

## 2023-07-12 PROCEDURE — 36415 COLL VENOUS BLD VENIPUNCTURE: CPT

## 2023-07-12 PROCEDURE — 85027 COMPLETE CBC AUTOMATED: CPT

## 2023-07-12 PROCEDURE — 87086 URINE CULTURE/COLONY COUNT: CPT

## 2023-07-12 PROCEDURE — 80048 BASIC METABOLIC PNL TOTAL CA: CPT

## 2023-07-12 RX ORDER — TIZANIDINE 4 MG/1
4 TABLET ORAL EVERY 6 HOURS PRN
COMMUNITY

## 2023-07-12 RX ORDER — VALSARTAN AND HYDROCHLOROTHIAZIDE 160; 12.5 MG/1; MG/1
1 TABLET, FILM COATED ORAL DAILY
COMMUNITY

## 2023-07-12 RX ORDER — TAMSULOSIN HYDROCHLORIDE 0.4 MG/1
0.4 CAPSULE ORAL DAILY
COMMUNITY

## 2023-07-12 RX ORDER — PANTOPRAZOLE SODIUM 40 MG/1
40 FOR SUSPENSION ORAL
COMMUNITY

## 2023-07-12 RX ORDER — KETOROLAC TROMETHAMINE 10 MG/1
10 TABLET, FILM COATED ORAL EVERY 6 HOURS PRN
COMMUNITY

## 2023-07-12 NOTE — DISCHARGE INSTRUCTIONS
Pre-op Instructions For Out-Patient Surgery    Medication Instructions:  Please stop herbs and any supplements now (includes vitamins and minerals). Please contact your surgeon and prescribing physician for pre-op instructions for any blood thinners. Stop Aspirin, Ibuprofen, Toradol as directed    If you have inhalers/aerosol treatments at home, please use them the morning of your surgery and bring the inhalers with you to the hospital.    Please take the following medications the morning of your surgery with a sip of water:    Pantoprazole, Levothyroxine    Surgery Instructions:  After midnight before surgery:  Do not eat or drink anything, including water, mints, gum, and hard candy. You may brush your teeth without swallowing. No smoking, chewing tobacco, or street drugs. Please shower or bathe before surgery. If you were given Surgical Scrub Chlorhexidine Gluconate Liquid (CHG), please shower the night before and the morning of your surgery following the detailed instructions you received during your pre-admission visit. Please do not wear any cologne, lotion, powder, deodorant, jewelry, piercings, perfume, makeup, nail polish, hair accessories, or hair spray on the day of surgery. Wear loose comfortable clothing. Leave your valuables at home. Bring a storage case for any glasses/contacts. An adult who is responsible for you MUST drive you home and should be with you for the first 24 hours after surgery. If having out-patient knee and foot surgeries, please arrange for planned crutches, walker, or wheelchair before arriving to the hospital.    The Day of Surgery:  Arrive at [facility] AT [facility] Surgery Entrance at the time directed by your surgeon and check in at the desk. If you have a living will or healthcare power of , please bring a copy. You will be taken to the pre-op holding area where you will be prepared for surgery.   A physical

## 2023-07-12 NOTE — PROGRESS NOTES
Dr. Ugo Luna, anesthesia, was contacted and informed of the patient's planned surgery, history, and unconfirmed abnormal EKG results from EKG done today in [facility]. Medical clearance required. Chart coordinator will notify Dr. Aly Parks office who will be responsible for making sure the clearance is obtained and is in the chart for surgery.

## 2023-07-13 LAB
MICROORGANISM SPEC CULT: NO GROWTH
SPECIMEN DESCRIPTION: NORMAL

## 2023-07-14 LAB
EKG ATRIAL RATE: 72 BPM
EKG P AXIS: 63 DEGREES
EKG P-R INTERVAL: 142 MS
EKG Q-T INTERVAL: 422 MS
EKG QRS DURATION: 98 MS
EKG QTC CALCULATION (BAZETT): 462 MS
EKG R AXIS: -4 DEGREES
EKG T AXIS: 22 DEGREES
EKG VENTRICULAR RATE: 72 BPM

## 2023-07-14 PROCEDURE — 93010 ELECTROCARDIOGRAM REPORT: CPT | Performed by: INTERNAL MEDICINE

## 2023-07-17 ENCOUNTER — ANESTHESIA EVENT (OUTPATIENT)
Dept: OPERATING ROOM | Age: 68
End: 2023-07-17
Payer: MEDICARE

## 2023-07-17 NOTE — PRE-PROCEDURE INSTRUCTIONS
No answer, left message ? yes                    Unable to leave message ? When were you told to arrive at hospital ?  1115    Do you have a  ? Are you on any blood thinners ? If yes when did you stop taking ? Do you have your prep Rx filled and instruction ? Nothing to eat the day before , only clear liquids. Are you experiencing any covid symptoms ? Do you have any infections or rash we should be aware of ? Do you have the Hibiclens soap to use the night before and the morning of surgery ? Nothing to eat or drink after midnight, only a sip of water to take any medication instructed to take the night before. Wear comfortable clothing, leave any valuables at home, remove any jewelry and body piercing .    Advised patient of above on message, apparently has appt today for clearance dept ## left if any questions

## 2023-07-18 ENCOUNTER — HOSPITAL ENCOUNTER (OUTPATIENT)
Age: 68
Setting detail: OUTPATIENT SURGERY
Discharge: HOME | End: 2023-07-18
Attending: UROLOGY | Admitting: UROLOGY
Payer: MEDICARE

## 2023-07-18 ENCOUNTER — APPOINTMENT (OUTPATIENT)
Dept: GENERAL RADIOLOGY | Age: 68
End: 2023-07-18
Attending: UROLOGY
Payer: MEDICARE

## 2023-07-18 ENCOUNTER — ANESTHESIA (OUTPATIENT)
Dept: OPERATING ROOM | Age: 68
End: 2023-07-18
Payer: MEDICARE

## 2023-07-18 VITALS
WEIGHT: 163 LBS | RESPIRATION RATE: 14 BRPM | TEMPERATURE: 96.9 F | DIASTOLIC BLOOD PRESSURE: 68 MMHG | SYSTOLIC BLOOD PRESSURE: 119 MMHG | BODY MASS INDEX: 30 KG/M2 | OXYGEN SATURATION: 100 % | HEIGHT: 62 IN | HEART RATE: 64 BPM

## 2023-07-18 DIAGNOSIS — N20.0 KIDNEY STONES: Primary | ICD-10-CM

## 2023-07-18 LAB
METER GLUCOSE: 119 MG/DL (ref 65–105)
METER GLUCOSE: 127 MG/DL (ref 65–105)

## 2023-07-18 PROCEDURE — 7100000001 HC PACU RECOVERY - ADDTL 15 MIN: Performed by: UROLOGY

## 2023-07-18 PROCEDURE — 7100000030 HC ASPR PHASE II RECOVERY - FIRST 15 MIN: Performed by: UROLOGY

## 2023-07-18 PROCEDURE — 3700000000 HC ANESTHESIA ATTENDED CARE: Performed by: UROLOGY

## 2023-07-18 PROCEDURE — 82947 ASSAY GLUCOSE BLOOD QUANT: CPT

## 2023-07-18 PROCEDURE — 6370000000 HC RX 637 (ALT 250 FOR IP): Performed by: ANESTHESIOLOGY

## 2023-07-18 PROCEDURE — 3600000013 HC SURGERY LEVEL 3 ADDTL 15MIN: Performed by: UROLOGY

## 2023-07-18 PROCEDURE — 3700000001 HC ADD 15 MINUTES (ANESTHESIA): Performed by: UROLOGY

## 2023-07-18 PROCEDURE — 7100000011 HC PHASE II RECOVERY - ADDTL 15 MIN: Performed by: UROLOGY

## 2023-07-18 PROCEDURE — C2617 STENT, NON-COR, TEM W/O DEL: HCPCS | Performed by: UROLOGY

## 2023-07-18 PROCEDURE — 2580000003 HC RX 258: Performed by: ANESTHESIOLOGY

## 2023-07-18 PROCEDURE — 2720000010 HC SURG SUPPLY STERILE: Performed by: UROLOGY

## 2023-07-18 PROCEDURE — 7100000000 HC PACU RECOVERY - FIRST 15 MIN: Performed by: UROLOGY

## 2023-07-18 PROCEDURE — 7100000031 HC ASPR PHASE II RECOVERY - ADDTL 15 MIN: Performed by: UROLOGY

## 2023-07-18 PROCEDURE — 3600000003 HC SURGERY LEVEL 3 BASE: Performed by: UROLOGY

## 2023-07-18 PROCEDURE — 2709999900 HC NON-CHARGEABLE SUPPLY: Performed by: UROLOGY

## 2023-07-18 PROCEDURE — 6360000002 HC RX W HCPCS: Performed by: NURSE ANESTHETIST, CERTIFIED REGISTERED

## 2023-07-18 PROCEDURE — C1769 GUIDE WIRE: HCPCS | Performed by: UROLOGY

## 2023-07-18 PROCEDURE — 7100000010 HC PHASE II RECOVERY - FIRST 15 MIN: Performed by: UROLOGY

## 2023-07-18 PROCEDURE — C1758 CATHETER, URETERAL: HCPCS | Performed by: UROLOGY

## 2023-07-18 DEVICE — URETERAL STENT WITH SIDE HOLES 6FX24CM
Type: IMPLANTABLE DEVICE | Site: URETER | Status: FUNCTIONAL
Brand: TRIA™ SOFT

## 2023-07-18 RX ORDER — SODIUM CHLORIDE 9 MG/ML
INJECTION, SOLUTION INTRAVENOUS PRN
Status: DISCONTINUED | OUTPATIENT
Start: 2023-07-18 | End: 2023-07-18 | Stop reason: HOSPADM

## 2023-07-18 RX ORDER — CEFAZOLIN SODIUM 1 G/3ML
INJECTION, POWDER, FOR SOLUTION INTRAMUSCULAR; INTRAVENOUS PRN
Status: DISCONTINUED | OUTPATIENT
Start: 2023-07-18 | End: 2023-07-18 | Stop reason: SDUPTHER

## 2023-07-18 RX ORDER — FENTANYL CITRATE 0.05 MG/ML
25 INJECTION, SOLUTION INTRAMUSCULAR; INTRAVENOUS EVERY 5 MIN PRN
Status: DISCONTINUED | OUTPATIENT
Start: 2023-07-18 | End: 2023-07-18 | Stop reason: HOSPADM

## 2023-07-18 RX ORDER — SODIUM CHLORIDE 0.9 % (FLUSH) 0.9 %
5-40 SYRINGE (ML) INJECTION PRN
Status: DISCONTINUED | OUTPATIENT
Start: 2023-07-18 | End: 2023-07-18 | Stop reason: HOSPADM

## 2023-07-18 RX ORDER — GABAPENTIN 100 MG/1
100 CAPSULE ORAL ONCE
Status: COMPLETED | OUTPATIENT
Start: 2023-07-18 | End: 2023-07-18

## 2023-07-18 RX ORDER — HYDRALAZINE HYDROCHLORIDE 20 MG/ML
10 INJECTION INTRAMUSCULAR; INTRAVENOUS
Status: DISCONTINUED | OUTPATIENT
Start: 2023-07-18 | End: 2023-07-18 | Stop reason: HOSPADM

## 2023-07-18 RX ORDER — LIDOCAINE HYDROCHLORIDE 10 MG/ML
1 INJECTION, SOLUTION EPIDURAL; INFILTRATION; INTRACAUDAL; PERINEURAL
Status: DISCONTINUED | OUTPATIENT
Start: 2023-07-18 | End: 2023-07-18 | Stop reason: HOSPADM

## 2023-07-18 RX ORDER — PROPOFOL 10 MG/ML
INJECTION, EMULSION INTRAVENOUS PRN
Status: DISCONTINUED | OUTPATIENT
Start: 2023-07-18 | End: 2023-07-18 | Stop reason: SDUPTHER

## 2023-07-18 RX ORDER — MEPERIDINE HYDROCHLORIDE 25 MG/ML
12.5 INJECTION INTRAMUSCULAR; INTRAVENOUS; SUBCUTANEOUS EVERY 5 MIN PRN
Status: DISCONTINUED | OUTPATIENT
Start: 2023-07-18 | End: 2023-07-18 | Stop reason: HOSPADM

## 2023-07-18 RX ORDER — SODIUM CHLORIDE, SODIUM LACTATE, POTASSIUM CHLORIDE, CALCIUM CHLORIDE 600; 310; 30; 20 MG/100ML; MG/100ML; MG/100ML; MG/100ML
INJECTION, SOLUTION INTRAVENOUS CONTINUOUS
Status: DISCONTINUED | OUTPATIENT
Start: 2023-07-18 | End: 2023-07-18 | Stop reason: HOSPADM

## 2023-07-18 RX ORDER — ACETAMINOPHEN 325 MG/1
650 TABLET ORAL
Status: DISCONTINUED | OUTPATIENT
Start: 2023-07-18 | End: 2023-07-18 | Stop reason: HOSPADM

## 2023-07-18 RX ORDER — HYDROCODONE BITARTRATE AND ACETAMINOPHEN 5; 325 MG/1; MG/1
1 TABLET ORAL
Status: DISCONTINUED | OUTPATIENT
Start: 2023-07-18 | End: 2023-07-18 | Stop reason: HOSPADM

## 2023-07-18 RX ORDER — HYDROCODONE BITARTRATE AND ACETAMINOPHEN 5; 325 MG/1; MG/1
1 TABLET ORAL EVERY 6 HOURS PRN
Qty: 8 TABLET | Refills: 0 | Status: SHIPPED | OUTPATIENT
Start: 2023-07-18 | End: 2023-07-21

## 2023-07-18 RX ORDER — MIDAZOLAM HYDROCHLORIDE 1 MG/ML
INJECTION INTRAMUSCULAR; INTRAVENOUS PRN
Status: DISCONTINUED | OUTPATIENT
Start: 2023-07-18 | End: 2023-07-18 | Stop reason: SDUPTHER

## 2023-07-18 RX ORDER — SODIUM CHLORIDE 0.9 % (FLUSH) 0.9 %
5-40 SYRINGE (ML) INJECTION EVERY 12 HOURS SCHEDULED
Status: DISCONTINUED | OUTPATIENT
Start: 2023-07-18 | End: 2023-07-18 | Stop reason: HOSPADM

## 2023-07-18 RX ORDER — ACETAMINOPHEN 500 MG
1000 TABLET ORAL ONCE
Status: COMPLETED | OUTPATIENT
Start: 2023-07-18 | End: 2023-07-18

## 2023-07-18 RX ORDER — FENTANYL CITRATE 50 UG/ML
INJECTION, SOLUTION INTRAMUSCULAR; INTRAVENOUS PRN
Status: DISCONTINUED | OUTPATIENT
Start: 2023-07-18 | End: 2023-07-18 | Stop reason: SDUPTHER

## 2023-07-18 RX ORDER — METOCLOPRAMIDE HYDROCHLORIDE 5 MG/ML
10 INJECTION INTRAMUSCULAR; INTRAVENOUS
Status: DISCONTINUED | OUTPATIENT
Start: 2023-07-18 | End: 2023-07-18 | Stop reason: HOSPADM

## 2023-07-18 RX ORDER — LABETALOL HYDROCHLORIDE 5 MG/ML
10 INJECTION, SOLUTION INTRAVENOUS
Status: DISCONTINUED | OUTPATIENT
Start: 2023-07-18 | End: 2023-07-18 | Stop reason: HOSPADM

## 2023-07-18 RX ORDER — DIPHENHYDRAMINE HYDROCHLORIDE 50 MG/ML
12.5 INJECTION INTRAMUSCULAR; INTRAVENOUS
Status: DISCONTINUED | OUTPATIENT
Start: 2023-07-18 | End: 2023-07-18 | Stop reason: HOSPADM

## 2023-07-18 RX ORDER — ONDANSETRON 2 MG/ML
4 INJECTION INTRAMUSCULAR; INTRAVENOUS
Status: DISCONTINUED | OUTPATIENT
Start: 2023-07-18 | End: 2023-07-18 | Stop reason: HOSPADM

## 2023-07-18 RX ADMIN — GABAPENTIN 100 MG: 100 CAPSULE ORAL at 12:05

## 2023-07-18 RX ADMIN — FENTANYL CITRATE 50 MCG: 50 INJECTION, SOLUTION INTRAMUSCULAR; INTRAVENOUS at 13:24

## 2023-07-18 RX ADMIN — CEFAZOLIN 2 G: 1 INJECTION, POWDER, FOR SOLUTION INTRAMUSCULAR; INTRAVENOUS at 13:23

## 2023-07-18 RX ADMIN — PROPOFOL 100 MG: 10 INJECTION, EMULSION INTRAVENOUS at 13:21

## 2023-07-18 RX ADMIN — MIDAZOLAM 2 MG: 1 INJECTION INTRAMUSCULAR; INTRAVENOUS at 13:17

## 2023-07-18 RX ADMIN — SODIUM CHLORIDE, POTASSIUM CHLORIDE, SODIUM LACTATE AND CALCIUM CHLORIDE: 600; 310; 30; 20 INJECTION, SOLUTION INTRAVENOUS at 12:14

## 2023-07-18 RX ADMIN — ACETAMINOPHEN 1000 MG: 500 TABLET ORAL at 12:05

## 2023-07-18 RX ADMIN — FENTANYL CITRATE 50 MCG: 50 INJECTION, SOLUTION INTRAMUSCULAR; INTRAVENOUS at 13:27

## 2023-07-18 ASSESSMENT — ENCOUNTER SYMPTOMS
SHORTNESS OF BREATH: 0
STRIDOR: 0

## 2023-07-18 ASSESSMENT — PAIN DESCRIPTION - LOCATION: LOCATION: FLANK

## 2023-07-18 ASSESSMENT — PAIN DESCRIPTION - ORIENTATION: ORIENTATION: RIGHT

## 2023-07-18 ASSESSMENT — PAIN DESCRIPTION - PAIN TYPE: TYPE: SURGICAL PAIN

## 2023-07-18 ASSESSMENT — PAIN DESCRIPTION - DESCRIPTORS: DESCRIPTORS: ACHING

## 2023-07-18 ASSESSMENT — PAIN - FUNCTIONAL ASSESSMENT: PAIN_FUNCTIONAL_ASSESSMENT: 0-10

## 2023-07-18 ASSESSMENT — PAIN SCALES - GENERAL: PAINLEVEL_OUTOF10: 3

## 2023-07-18 ASSESSMENT — LIFESTYLE VARIABLES: SMOKING_STATUS: 0

## 2023-07-18 NOTE — ANESTHESIA PRE PROCEDURE
Department of Anesthesiology  Preprocedure Note       Name:  Brittny Khan   Age:  75 y.o.  :  1955                                          MRN:  [id number]         Date:  2023      Surgeon: Janelle Mariano):  Darryle Grater, MD    Procedure: Procedure(s):  CYSTOSCOPY RIGHT  URETEROSCOPY HOLMIUM  LASER RIGHT STENT PLACEMENT    Medications prior to admission:   Prior to Admission medications    Medication Sig Start Date End Date Taking? Authorizing Provider   valsartan-hydroCHLOROthiazide (DIOVAN-HCT) 160-12.5 MG per tablet Take 1 tablet by mouth daily    Historical Provider, MD   tamsulosin (FLOMAX) 0.4 MG capsule Take 1 capsule by mouth daily    Historical Provider, MD   tiZANidine (ZANAFLEX) 4 MG tablet Take 1 tablet by mouth every 6 hours as needed    Historical Provider, MD   methylPREDNISolone (MEDROL PO) Take by mouth Dose pack  Patient not taking: Reported on 2023    Historical Provider, MD   ketorolac (TORADOL) 10 MG tablet Take 1 tablet by mouth every 6 hours as needed for Pain    Historical Provider, MD   pantoprazole sodium (PROTONIX) 40 MG PACK packet Take 1 packet by mouth every morning (before breakfast)    Historical Provider, MD   atorvastatin (LIPITOR) 20 MG tablet Take 1 tablet by mouth daily    Historical Provider, MD   ondansetron (ZOFRAN ODT) 4 MG disintegrating tablet Take 1 tablet by mouth every 8 hours as needed for Nausea 22   Cristine Jen III, MD   Cromolyn Sodium (NASAL ALLERGY NA) 1 spray by Nasal route as needed    Historical Provider, MD   ALPRAZolam Gregg Pel) 0.25 MG tablet Take 1 tablet by mouth nightly as needed for Sleep.  Flight only    Historical Provider, MD   ibuprofen (ADVIL;MOTRIN) 800 MG tablet Take 1 tablet by mouth every 6 hours as needed for Pain    Historical Provider, MD   citalopram (CELEXA) 20 MG tablet Take 1 tablet by mouth daily    Historical Provider, MD   aspirin 81 MG tablet Take 1 tablet by mouth daily    Historical Provider, MD   levothyroxine

## 2023-07-18 NOTE — H&P
anesthesia that  her airway is difficult to maintain open    Benign hypertension with CKD (chronic kidney disease), stage II     CKD (chronic kidney disease) stage 2, GFR 60-89 ml/min     Diabetes mellitus (HCC)     not on any meds since losing weight    Gall stones     History of hematuria     follows with Dr. Mandy Garcia    Hyperlipidemia     Hypertension     Hypothyroidism     Kidney stones     Sleep apnea     does not wear machine       SURGICAL HISTORY       Past Surgical History:   Procedure Laterality Date    CARDIOVASCULAR STRESS TEST  2020    CHOLECYSTECTOMY      COLONOSCOPY      CYSTOCELE REPAIR  07/2022    CYSTOSCOPY Left 01/15/2019    CYSTOSCOPY URETEROSCOPY LASER HOLMIUM  LITHO & STENT INSERTION performed by Loretta Pacheco MD at [address] ARTHROSCOPY Right 12/26/2017    OTHER SURGICAL HISTORY      MS ARTHROSCOPY KNEE DIAGNOSTIC W/WO SYNOVIAL BX SPX Right 12/26/2017    ARTHROSCOPY KNEE performed by Ross Pino MD at [address] [facility] [address] HAND/FOOT Right 05/21/2018    PERONEAL NERVE RELEASE performed by Dot Viera MD at [address]  07/2022    UPPER GASTROINTESTINAL ENDOSCOPY N/A 01/24/2023    RECTAL ENDOSCOPIC ULTRASOUND performed by Hermes Ny MD at [address] [facility]       Family History   Problem Relation Age of Onset    High Blood Pressure Mother     Stroke Mother     Diabetes Mother     Heart Disease Mother     Breast Cancer Mother 72    Cancer Father     Diabetes Maternal Aunt     Diabetes Maternal Grandmother        SOCIAL HISTORY       Social History     Socioeconomic History    Marital status:      Spouse name: None    Number of children: None    Years of education: None    Highest education level: None   Tobacco Use    Smoking status: Never    Smokeless tobacco: Never   Vaping Use    Vaping Use: Never used   Substance and Sexual Activity    Alcohol use: Yes     Comment: rare    Drug use: No           REVIEW OF SYSTEMS

## 2023-07-18 NOTE — DISCHARGE INSTRUCTIONS
have pain in your back just below your rib cage. This is called flank pain. You have pain or burning when you urinate that continues after 2 days. You have a frequent urge to urinate but can pass only small amounts of urine. Redness or swelling at IV site. For any questions or concerns you may have.

## 2023-07-18 NOTE — BRIEF OP NOTE
Brief Postoperative Note      Patient: Maria Isabel Nova  YOB: 1955  MRN: [id number]    Date of Procedure: 7/18/2023    Pre-Op Diagnosis Codes:     * Right ureteral calculus [N20.1]    Post-Op Diagnosis: Same       Procedure(s):  CYSTOSCOPY RIGHT  URETEROSCOPY HOLMIUM  LASER RIGHT STENT PLACEMENT    Surgeon(s):  Sulma Gray MD    Assistant:  * No surgical staff found *    Anesthesia: General    Estimated Blood Loss (mL): Minimal    Complications: None    Specimens:   * No specimens in log *    Implants:  Implant Name Type Inv. Item Serial No.  Lot No. LRB No. Used Action   STENT URETERAL 6 FRX24 CM SOFT MONOFILAMENT TRIA - [id number]  STENT URETERAL 6 FRX24 CM SOFT MONOFILAMENT TRIA  [employer] [facility]- [id number] Right 1 Implanted         Drains: * No LDAs found *    Findings: right ureteral stone fragmented, stent placed.        Electronically signed by Sulma Gray MD on 7/18/2023 at 1:51 PM

## 2024-02-22 ENCOUNTER — HOSPITAL ENCOUNTER (OUTPATIENT)
Dept: MAMMOGRAPHY | Age: 69
Discharge: HOME | End: 2024-02-24
Payer: MEDICARE

## 2024-02-22 DIAGNOSIS — Z12.31 ENCOUNTER FOR SCREENING MAMMOGRAM FOR BREAST CANCER: ICD-10-CM

## 2024-02-22 PROCEDURE — 77067 SCR MAMMO BI INCL CAD: CPT

## 2025-03-12 ENCOUNTER — HOSPITAL ENCOUNTER (OUTPATIENT)
Dept: MAMMOGRAPHY | Age: 70
Discharge: HOME | End: 2025-03-14
Payer: MEDICARE

## 2025-03-12 DIAGNOSIS — Z12.31 ENCOUNTER FOR SCREENING MAMMOGRAM FOR BREAST CANCER: ICD-10-CM

## 2025-03-12 PROCEDURE — 77063 BREAST TOMOSYNTHESIS BI: CPT

## 2025-04-11 ENCOUNTER — TRANSCRIBE ORDERS (OUTPATIENT)
Dept: GENERAL RADIOLOGY | Facility: CLINIC | Age: 70
End: 2025-04-11

## 2025-04-11 ENCOUNTER — HOSPITAL ENCOUNTER (OUTPATIENT)
Dept: GENERAL RADIOLOGY | Facility: CLINIC | Age: 70
Discharge: HOME | End: 2025-04-13
Payer: MEDICARE

## 2025-04-11 DIAGNOSIS — N20.0 CALCULUS OF KIDNEY: ICD-10-CM

## 2025-04-11 DIAGNOSIS — N20.0 CALCULUS OF KIDNEY: Primary | ICD-10-CM

## 2025-04-11 PROCEDURE — 74018 RADEX ABDOMEN 1 VIEW: CPT

## (undated) DEVICE — ADAPTER URO SCP UROLOK LL

## (undated) DEVICE — STRAP,POSITIONING,KNEE/BODY,FOAM,4X60": Brand: MEDLINE

## (undated) DEVICE — GUIDEWIRE URO L150CM DIA0.035IN STIFF NIT HYDRPHLC STR TIP

## (undated) DEVICE — DRESSING,GAUZE,XEROFORM,CURAD,1"X8",ST: Brand: CURAD

## (undated) DEVICE — COVER,MAYO STAND,XL,STERILE: Brand: MEDLINE

## (undated) DEVICE — STRIP,CLOSURE,WOUND,MEDI-STRIP,1/2X4: Brand: MEDLINE

## (undated) DEVICE — HYPODERMIC SAFETY NEEDLE: Brand: MAGELLAN

## (undated) DEVICE — BANDAGE,SELF ADHRNT,COFLEX,4"X5YD,STRL: Brand: COLABEL

## (undated) DEVICE — SINGLE PORT MANIFOLD: Brand: NEPTUNE 2

## (undated) DEVICE — SOLUTION IV IRRIG LACTATED RINGERS 3000ML 2B7487

## (undated) DEVICE — GLOVE ORANGE PI 7 1/2   MSG9075

## (undated) DEVICE — PADDING UNDERCAST W6INXL4YD WYTEX 6 PER BG

## (undated) DEVICE — GOWN,SIRUS,NONRNF,SETINSLV,XL,20/CS: Brand: MEDLINE

## (undated) DEVICE — SOLUTION IRRIG 1000ML STRL H2O USP PLAS POUR BTL

## (undated) DEVICE — GLOVE SURG SZ 8 L12IN FNGR THK13MIL BRN LTX SYN POLYMER W

## (undated) DEVICE — RENTAL LASER HOLMIUM LOW WATTAGE CASE

## (undated) DEVICE — SOLUTION IRRIG 3000ML 0.9% SOD CHL USP UROMATIC PLAS CONT

## (undated) DEVICE — SOLUTION IV IRRIG WATER 1000ML POUR BRL 2F7114

## (undated) DEVICE — SYSTEM PMP SGL ACT W/ 10CC VAC SYR 1 W VLV FOR ENDOSCP SURG

## (undated) DEVICE — 3M™ STERI-STRIP™ COMPOUND BENZOIN TINCTURE 40 BAGS/CARTON 4 CARTONS/CASE C1544: Brand: 3M™ STERI-STRIP™

## (undated) DEVICE — FIBER ENT HOLM LSR 200 MIC STRL LTX FRE

## (undated) DEVICE — BANDAGE COMPR W4INXL5YD WHT BGE POLY COT M E WRP WV HK AND

## (undated) DEVICE — DUAL LUMEN URETERAL CATHETER

## (undated) DEVICE — SOLUTION IV IRRIG POUR BRL 0.9% SODIUM CHL 2F7124

## (undated) DEVICE — DRAPE,U/ SHT,SPLIT,PLAS,STERIL: Brand: MEDLINE

## (undated) DEVICE — [TOMCAT CUTTER, ARTHROSCOPIC SHAVER BLADE,  DO NOT RESTERILIZE,  DO NOT USE IF PACKAGE IS DAMAGED,  KEEP DRY,  KEEP AWAY FROM SUNLIGHT]: Brand: FORMULA

## (undated) DEVICE — DISCONTINUED NO SUB IDED TG GLOVE SURG SENSICARE ALOE LT LF PF ST GRN SZ 8

## (undated) DEVICE — ST CHARLES CYSTO PACK: Brand: MEDLINE INDUSTRIES, INC.

## (undated) DEVICE — 3M™ WARMING BLANKET, UPPER BODY, 10 PER CASE, 42268: Brand: BAIR HUGGER™

## (undated) DEVICE — Device

## (undated) DEVICE — STOCKINETTE ORTH W6XL48IN OFF WHT SGL PLY UNBLEACHED COT RIB

## (undated) DEVICE — FLEXIBLE YANKAUER,LARGE TIP, NO VACUUM CONTROL: Brand: ARGYLE

## (undated) DEVICE — SUTURE ETHLN SZ 3-0 L18IN NONABSORBABLE BLK FS-1 L24MM 3/8 663H

## (undated) DEVICE — ZIMMER® STERILE DISPOSABLE TOURNIQUET CUFF WITH PLC, DUAL PORT, SINGLE BLADDER, 30 IN. (76 CM)

## (undated) DEVICE — SINGLE ACTION PUMPING SYSTEM

## (undated) DEVICE — CAP INJ L0.75IN INTMIT

## (undated) DEVICE — ESMARK: Brand: DEROYAL

## (undated) DEVICE — STOCKINETTE,IMPERVIOUS,12X48,STERILE: Brand: MEDLINE

## (undated) DEVICE — GLOVE SURG SZ 75 STD WHT LTX SYN POLYMER BEAD REINF ANTI RL

## (undated) DEVICE — Z DISCONTINUED PER MEDLINE USE 2741942 DRESSING AQUACEL 6 IN ALG W9XL15CM SIL CVR WTRPRF VIR BACT BARR ANTIMIC

## (undated) DEVICE — GUIDEWIRE URO L150CM DIA .035IN STIFF NIT HYDRPHL STR TIP

## (undated) DEVICE — SUTURE VCRL + SZ 2-0 L27IN ABSRB UD CP-1 1/2 CIR REV CUT VCP266H

## (undated) DEVICE — BANDAGE,GAUZE,BULKEE II,4.5"X4.1YD,STRL: Brand: MEDLINE

## (undated) DEVICE — PACK ARTHRO W PCH

## (undated) DEVICE — Z INACTIVE USE 2660664 SOLUTION IRRIG 3000ML 0.9% SOD CHL USP UROMATIC PLAS CONT